# Patient Record
Sex: FEMALE | Race: BLACK OR AFRICAN AMERICAN | Employment: OTHER | URBAN - METROPOLITAN AREA
[De-identification: names, ages, dates, MRNs, and addresses within clinical notes are randomized per-mention and may not be internally consistent; named-entity substitution may affect disease eponyms.]

---

## 2017-06-28 ENCOUNTER — APPOINTMENT (OUTPATIENT)
Dept: ULTRASOUND IMAGING | Age: 62
DRG: 683 | End: 2017-06-28
Attending: EMERGENCY MEDICINE
Payer: COMMERCIAL

## 2017-06-28 ENCOUNTER — HOSPITAL ENCOUNTER (INPATIENT)
Age: 62
LOS: 5 days | Discharge: HOME | DRG: 683 | End: 2017-07-03
Attending: EMERGENCY MEDICINE | Admitting: INTERNAL MEDICINE
Payer: COMMERCIAL

## 2017-06-28 DIAGNOSIS — R73.9 HYPERGLYCEMIA: ICD-10-CM

## 2017-06-28 DIAGNOSIS — E87.5 ACUTE HYPERKALEMIA: ICD-10-CM

## 2017-06-28 DIAGNOSIS — R53.83 FATIGUE, UNSPECIFIED TYPE: ICD-10-CM

## 2017-06-28 DIAGNOSIS — D64.9 ANEMIA, UNSPECIFIED TYPE: ICD-10-CM

## 2017-06-28 DIAGNOSIS — N17.9 ACUTE RENAL FAILURE, UNSPECIFIED ACUTE RENAL FAILURE TYPE (HCC): Primary | ICD-10-CM

## 2017-06-28 DIAGNOSIS — R74.8 ELEVATED LIPASE: ICD-10-CM

## 2017-06-28 PROBLEM — R93.2 ABNORMAL ULTRASOUND OF GALLBLADDER: Status: ACTIVE | Noted: 2017-06-28

## 2017-06-28 PROBLEM — E11.9 DM TYPE 2 (DIABETES MELLITUS, TYPE 2) (HCC): Status: ACTIVE | Noted: 2017-06-28

## 2017-06-28 PROBLEM — I10 HTN (HYPERTENSION): Chronic | Status: ACTIVE | Noted: 2017-06-28

## 2017-06-28 PROBLEM — I10 HTN (HYPERTENSION): Status: ACTIVE | Noted: 2017-06-28

## 2017-06-28 PROBLEM — E11.9 DM TYPE 2 (DIABETES MELLITUS, TYPE 2) (HCC): Chronic | Status: ACTIVE | Noted: 2017-06-28

## 2017-06-28 LAB
ALBUMIN SERPL BCP-MCNC: 3.1 G/DL (ref 3.5–5)
ALBUMIN/GLOB SERPL: 0.7 {RATIO} (ref 1.1–2.2)
ALP SERPL-CCNC: 81 U/L (ref 45–117)
ALT SERPL-CCNC: 15 U/L (ref 12–78)
ANION GAP BLD CALC-SCNC: 14 MMOL/L (ref 5–15)
APPEARANCE UR: ABNORMAL
APTT PPP: 32.2 SEC (ref 22.1–32.5)
AST SERPL W P-5'-P-CCNC: 12 U/L (ref 15–37)
ATRIAL RATE: 77 BPM
BACTERIA URNS QL MICRO: ABNORMAL /HPF
BASOPHILS # BLD AUTO: 0 K/UL (ref 0–0.1)
BASOPHILS # BLD: 0 % (ref 0–1)
BILIRUB SERPL-MCNC: 0.5 MG/DL (ref 0.2–1)
BILIRUB UR QL: NEGATIVE
BUN SERPL-MCNC: 89 MG/DL (ref 6–20)
BUN/CREAT SERPL: 8 (ref 12–20)
CALCIUM SERPL-MCNC: 8.8 MG/DL (ref 8.5–10.1)
CALCULATED P AXIS, ECG09: 52 DEGREES
CALCULATED R AXIS, ECG10: 46 DEGREES
CALCULATED T AXIS, ECG11: 43 DEGREES
CHLORIDE SERPL-SCNC: 98 MMOL/L (ref 97–108)
CO2 SERPL-SCNC: 19 MMOL/L (ref 21–32)
COLOR UR: ABNORMAL
CREAT SERPL-MCNC: 11.18 MG/DL (ref 0.55–1.02)
CREAT UR-MCNC: 61.24 MG/DL
DIAGNOSIS, 93000: NORMAL
EOSINOPHIL # BLD: 0.2 K/UL (ref 0–0.4)
EOSINOPHIL NFR BLD: 2 % (ref 0–7)
EPITH CASTS URNS QL MICRO: ABNORMAL /LPF
ERYTHROCYTE [DISTWIDTH] IN BLOOD BY AUTOMATED COUNT: 14.3 % (ref 11.5–14.5)
EST. AVERAGE GLUCOSE BLD GHB EST-MCNC: 189 MG/DL
FERRITIN SERPL-MCNC: 79 NG/ML (ref 8–252)
FOLATE SERPL-MCNC: 22.7 NG/ML (ref 5–21)
GLOBULIN SER CALC-MCNC: 4.6 G/DL (ref 2–4)
GLUCOSE BLD STRIP.AUTO-MCNC: 179 MG/DL (ref 65–100)
GLUCOSE BLD STRIP.AUTO-MCNC: 212 MG/DL (ref 65–100)
GLUCOSE BLD STRIP.AUTO-MCNC: 232 MG/DL (ref 65–100)
GLUCOSE BLD STRIP.AUTO-MCNC: 251 MG/DL (ref 65–100)
GLUCOSE BLD STRIP.AUTO-MCNC: 271 MG/DL (ref 65–100)
GLUCOSE SERPL-MCNC: 251 MG/DL (ref 65–100)
GLUCOSE UR STRIP.AUTO-MCNC: 100 MG/DL
HAPTOGLOB SERPL-MCNC: 424 MG/DL (ref 30–200)
HBA1C MFR BLD: 8.2 % (ref 4.2–6.3)
HCT VFR BLD AUTO: 27.6 % (ref 35–47)
HEMOCCULT STL QL: NEGATIVE
HGB BLD-MCNC: 8.8 G/DL (ref 11.5–16)
HGB UR QL STRIP: ABNORMAL
INR PPP: 1 (ref 0.9–1.1)
IRON SATN MFR SERPL: 20 % (ref 20–50)
IRON SERPL-MCNC: 55 UG/DL (ref 35–150)
KETONES UR QL STRIP.AUTO: NEGATIVE MG/DL
LDH SERPL L TO P-CCNC: 214 U/L (ref 81–246)
LEUKOCYTE ESTERASE UR QL STRIP.AUTO: ABNORMAL
LIPASE SERPL-CCNC: 903 U/L (ref 73–393)
LYMPHOCYTES # BLD AUTO: 23 % (ref 12–49)
LYMPHOCYTES # BLD: 2.3 K/UL (ref 0.8–3.5)
MAGNESIUM SERPL-MCNC: 2.7 MG/DL (ref 1.6–2.4)
MCH RBC QN AUTO: 27.5 PG (ref 26–34)
MCHC RBC AUTO-ENTMCNC: 31.9 G/DL (ref 30–36.5)
MCV RBC AUTO: 86.3 FL (ref 80–99)
MONOCYTES # BLD: 0.4 K/UL (ref 0–1)
MONOCYTES NFR BLD AUTO: 4 % (ref 5–13)
NEUTS SEG # BLD: 6.9 K/UL (ref 1.8–8)
NEUTS SEG NFR BLD AUTO: 71 % (ref 32–75)
NITRITE UR QL STRIP.AUTO: NEGATIVE
P-R INTERVAL, ECG05: 210 MS
PH UR STRIP: 6 [PH] (ref 5–8)
PHOSPHATE SERPL-MCNC: 6.4 MG/DL (ref 2.6–4.7)
PLATELET # BLD AUTO: 383 K/UL (ref 150–400)
POTASSIUM SERPL-SCNC: 7.4 MMOL/L (ref 3.5–5.1)
PROT SERPL-MCNC: 7.7 G/DL (ref 6.4–8.2)
PROT UR STRIP-MCNC: ABNORMAL MG/DL
PROT UR-MCNC: 42 MG/DL (ref 0–11.9)
PROTHROMBIN TIME: 9.9 SEC (ref 9–11.1)
Q-T INTERVAL, ECG07: 416 MS
QRS DURATION, ECG06: 106 MS
QTC CALCULATION (BEZET), ECG08: 470 MS
RBC # BLD AUTO: 3.2 M/UL (ref 3.8–5.2)
RBC #/AREA URNS HPF: ABNORMAL /HPF (ref 0–5)
RETICS/RBC NFR AUTO: 0.5 % (ref 0.7–2.1)
SERVICE CMNT-IMP: ABNORMAL
SODIUM SERPL-SCNC: 131 MMOL/L (ref 136–145)
SP GR UR REFRACTOMETRY: 1.01 (ref 1–1.03)
THERAPEUTIC RANGE,PTTT: NORMAL SECS (ref 58–77)
TIBC SERPL-MCNC: 279 UG/DL (ref 250–450)
UROBILINOGEN UR QL STRIP.AUTO: 0.2 EU/DL (ref 0.2–1)
VENTRICULAR RATE, ECG03: 77 BPM
VIT B12 SERPL-MCNC: 361 PG/ML (ref 211–911)
WBC # BLD AUTO: 9.9 K/UL (ref 3.6–11)
WBC URNS QL MICRO: ABNORMAL /HPF (ref 0–4)

## 2017-06-28 PROCEDURE — 82962 GLUCOSE BLOOD TEST: CPT

## 2017-06-28 PROCEDURE — 87086 URINE CULTURE/COLONY COUNT: CPT | Performed by: INTERNAL MEDICINE

## 2017-06-28 PROCEDURE — 81001 URINALYSIS AUTO W/SCOPE: CPT | Performed by: EMERGENCY MEDICINE

## 2017-06-28 PROCEDURE — 94644 CONT INHLJ TX 1ST HOUR: CPT

## 2017-06-28 PROCEDURE — 76700 US EXAM ABDOM COMPLETE: CPT

## 2017-06-28 PROCEDURE — 65660000000 HC RM CCU STEPDOWN

## 2017-06-28 PROCEDURE — 74011000258 HC RX REV CODE- 258: Performed by: EMERGENCY MEDICINE

## 2017-06-28 PROCEDURE — 96374 THER/PROPH/DIAG INJ IV PUSH: CPT

## 2017-06-28 PROCEDURE — 74011636637 HC RX REV CODE- 636/637: Performed by: EMERGENCY MEDICINE

## 2017-06-28 PROCEDURE — 87077 CULTURE AEROBIC IDENTIFY: CPT | Performed by: INTERNAL MEDICINE

## 2017-06-28 PROCEDURE — 83690 ASSAY OF LIPASE: CPT | Performed by: EMERGENCY MEDICINE

## 2017-06-28 PROCEDURE — 87186 SC STD MICRODIL/AGAR DIL: CPT | Performed by: INTERNAL MEDICINE

## 2017-06-28 PROCEDURE — 74011250637 HC RX REV CODE- 250/637: Performed by: INTERNAL MEDICINE

## 2017-06-28 PROCEDURE — 82272 OCCULT BLD FECES 1-3 TESTS: CPT | Performed by: INTERNAL MEDICINE

## 2017-06-28 PROCEDURE — 83036 HEMOGLOBIN GLYCOSYLATED A1C: CPT | Performed by: INTERNAL MEDICINE

## 2017-06-28 PROCEDURE — 85610 PROTHROMBIN TIME: CPT | Performed by: EMERGENCY MEDICINE

## 2017-06-28 PROCEDURE — 74011636637 HC RX REV CODE- 636/637: Performed by: INTERNAL MEDICINE

## 2017-06-28 PROCEDURE — 74011000258 HC RX REV CODE- 258: Performed by: INTERNAL MEDICINE

## 2017-06-28 PROCEDURE — 83615 LACTATE (LD) (LDH) ENZYME: CPT | Performed by: INTERNAL MEDICINE

## 2017-06-28 PROCEDURE — 74011250636 HC RX REV CODE- 250/636: Performed by: EMERGENCY MEDICINE

## 2017-06-28 PROCEDURE — 93005 ELECTROCARDIOGRAM TRACING: CPT

## 2017-06-28 PROCEDURE — 82570 ASSAY OF URINE CREATININE: CPT | Performed by: INTERNAL MEDICINE

## 2017-06-28 PROCEDURE — 84156 ASSAY OF PROTEIN URINE: CPT | Performed by: INTERNAL MEDICINE

## 2017-06-28 PROCEDURE — 82607 VITAMIN B-12: CPT | Performed by: INTERNAL MEDICINE

## 2017-06-28 PROCEDURE — 74011250636 HC RX REV CODE- 250/636: Performed by: INTERNAL MEDICINE

## 2017-06-28 PROCEDURE — 96375 TX/PRO/DX INJ NEW DRUG ADDON: CPT

## 2017-06-28 PROCEDURE — 85045 AUTOMATED RETICULOCYTE COUNT: CPT | Performed by: INTERNAL MEDICINE

## 2017-06-28 PROCEDURE — 77030013140 HC MSK NEB VYRM -A

## 2017-06-28 PROCEDURE — 96361 HYDRATE IV INFUSION ADD-ON: CPT

## 2017-06-28 PROCEDURE — 82728 ASSAY OF FERRITIN: CPT | Performed by: INTERNAL MEDICINE

## 2017-06-28 PROCEDURE — 83540 ASSAY OF IRON: CPT | Performed by: INTERNAL MEDICINE

## 2017-06-28 PROCEDURE — 36415 COLL VENOUS BLD VENIPUNCTURE: CPT | Performed by: EMERGENCY MEDICINE

## 2017-06-28 PROCEDURE — 83735 ASSAY OF MAGNESIUM: CPT | Performed by: EMERGENCY MEDICINE

## 2017-06-28 PROCEDURE — 84100 ASSAY OF PHOSPHORUS: CPT | Performed by: EMERGENCY MEDICINE

## 2017-06-28 PROCEDURE — 74011250637 HC RX REV CODE- 250/637: Performed by: EMERGENCY MEDICINE

## 2017-06-28 PROCEDURE — 80053 COMPREHEN METABOLIC PANEL: CPT | Performed by: EMERGENCY MEDICINE

## 2017-06-28 PROCEDURE — 74011000250 HC RX REV CODE- 250: Performed by: EMERGENCY MEDICINE

## 2017-06-28 PROCEDURE — 83010 ASSAY OF HAPTOGLOBIN QUANT: CPT | Performed by: INTERNAL MEDICINE

## 2017-06-28 PROCEDURE — 99285 EMERGENCY DEPT VISIT HI MDM: CPT

## 2017-06-28 PROCEDURE — 85025 COMPLETE CBC W/AUTO DIFF WBC: CPT | Performed by: EMERGENCY MEDICINE

## 2017-06-28 PROCEDURE — 74011000250 HC RX REV CODE- 250: Performed by: INTERNAL MEDICINE

## 2017-06-28 PROCEDURE — 85730 THROMBOPLASTIN TIME PARTIAL: CPT | Performed by: EMERGENCY MEDICINE

## 2017-06-28 PROCEDURE — 82746 ASSAY OF FOLIC ACID SERUM: CPT | Performed by: INTERNAL MEDICINE

## 2017-06-28 RX ORDER — ONDANSETRON 4 MG/1
4 TABLET, FILM COATED ORAL
COMMUNITY
End: 2017-06-28

## 2017-06-28 RX ORDER — PROCHLORPERAZINE EDISYLATE 5 MG/ML
10 INJECTION INTRAMUSCULAR; INTRAVENOUS
Status: DISCONTINUED | OUTPATIENT
Start: 2017-06-28 | End: 2017-07-03 | Stop reason: HOSPADM

## 2017-06-28 RX ORDER — SODIUM CHLORIDE 0.9 % (FLUSH) 0.9 %
5-10 SYRINGE (ML) INJECTION EVERY 8 HOURS
Status: DISCONTINUED | OUTPATIENT
Start: 2017-06-28 | End: 2017-07-03 | Stop reason: HOSPADM

## 2017-06-28 RX ORDER — FAMOTIDINE 10 MG/ML
20 INJECTION INTRAVENOUS
Status: COMPLETED | OUTPATIENT
Start: 2017-06-28 | End: 2017-06-28

## 2017-06-28 RX ORDER — ACETAMINOPHEN 325 MG/1
650 TABLET ORAL
Status: DISCONTINUED | OUTPATIENT
Start: 2017-06-28 | End: 2017-07-03 | Stop reason: HOSPADM

## 2017-06-28 RX ORDER — DEXTROSE 50 % IN WATER (D50W) INTRAVENOUS SYRINGE
12.5-25 AS NEEDED
Status: DISCONTINUED | OUTPATIENT
Start: 2017-06-28 | End: 2017-07-03 | Stop reason: HOSPADM

## 2017-06-28 RX ORDER — DICYCLOMINE HYDROCHLORIDE 10 MG/1
10 CAPSULE ORAL
Status: COMPLETED | OUTPATIENT
Start: 2017-06-28 | End: 2017-06-28

## 2017-06-28 RX ORDER — IBUPROFEN 200 MG
200 TABLET ORAL
COMMUNITY
End: 2017-07-03

## 2017-06-28 RX ORDER — INSULIN LISPRO 100 [IU]/ML
INJECTION, SOLUTION INTRAVENOUS; SUBCUTANEOUS
Status: DISCONTINUED | OUTPATIENT
Start: 2017-06-28 | End: 2017-07-03 | Stop reason: HOSPADM

## 2017-06-28 RX ORDER — LISINOPRIL 30 MG/1
30 TABLET ORAL DAILY
COMMUNITY
End: 2017-07-03

## 2017-06-28 RX ORDER — SODIUM CHLORIDE 0.9 % (FLUSH) 0.9 %
5-10 SYRINGE (ML) INJECTION AS NEEDED
Status: DISCONTINUED | OUTPATIENT
Start: 2017-06-28 | End: 2017-06-28

## 2017-06-28 RX ORDER — ZOLPIDEM TARTRATE 5 MG/1
5 TABLET ORAL
Status: DISCONTINUED | OUTPATIENT
Start: 2017-06-28 | End: 2017-07-03 | Stop reason: HOSPADM

## 2017-06-28 RX ORDER — OXYCODONE AND ACETAMINOPHEN 5; 325 MG/1; MG/1
1 TABLET ORAL
Status: DISCONTINUED | OUTPATIENT
Start: 2017-06-28 | End: 2017-07-03 | Stop reason: HOSPADM

## 2017-06-28 RX ORDER — ALBUTEROL SULFATE 0.83 MG/ML
10 SOLUTION RESPIRATORY (INHALATION) CONTINUOUS
Status: DISCONTINUED | OUTPATIENT
Start: 2017-06-28 | End: 2017-06-28

## 2017-06-28 RX ORDER — DEXTROSE 50 % IN WATER (D50W) INTRAVENOUS SYRINGE
50
Status: COMPLETED | OUTPATIENT
Start: 2017-06-28 | End: 2017-06-28

## 2017-06-28 RX ORDER — ESTRADIOL AND NORETHINDRONE ACETATE .5; .1 MG/1; MG/1
1 TABLET ORAL DAILY
COMMUNITY
End: 2018-11-20 | Stop reason: ALTCHOICE

## 2017-06-28 RX ORDER — HEPARIN SODIUM 5000 [USP'U]/ML
5000 INJECTION, SOLUTION INTRAVENOUS; SUBCUTANEOUS EVERY 8 HOURS
Status: DISCONTINUED | OUTPATIENT
Start: 2017-06-28 | End: 2017-07-03 | Stop reason: HOSPADM

## 2017-06-28 RX ORDER — SODIUM BICARBONATE 1 MEQ/ML
50 SYRINGE (ML) INTRAVENOUS
Status: COMPLETED | OUTPATIENT
Start: 2017-06-28 | End: 2017-06-28

## 2017-06-28 RX ORDER — LORATADINE 10 MG/1
10 TABLET ORAL DAILY
Status: ON HOLD | COMMUNITY
End: 2017-07-03

## 2017-06-28 RX ORDER — TRIAMTERENE/HYDROCHLOROTHIAZID 37.5-25 MG
1 TABLET ORAL DAILY
COMMUNITY
End: 2017-07-03

## 2017-06-28 RX ORDER — GLIPIZIDE 10 MG/1
10 TABLET ORAL 2 TIMES DAILY
COMMUNITY
End: 2017-06-28

## 2017-06-28 RX ORDER — CALCIUM GLUCONATE 94 MG/ML
1 INJECTION, SOLUTION INTRAVENOUS
Status: DISCONTINUED | OUTPATIENT
Start: 2017-06-28 | End: 2017-06-28

## 2017-06-28 RX ORDER — INSULIN GLARGINE 100 [IU]/ML
10 INJECTION, SOLUTION SUBCUTANEOUS
Status: DISCONTINUED | OUTPATIENT
Start: 2017-06-28 | End: 2017-07-03 | Stop reason: HOSPADM

## 2017-06-28 RX ORDER — ASPIRIN 81 MG/1
81 TABLET ORAL DAILY
Status: DISCONTINUED | OUTPATIENT
Start: 2017-06-28 | End: 2017-07-03 | Stop reason: HOSPADM

## 2017-06-28 RX ORDER — TRIAMTERENE/HYDROCHLOROTHIAZID 37.5-25 MG
TABLET ORAL DAILY
COMMUNITY
End: 2017-06-28

## 2017-06-28 RX ORDER — LORATADINE 10 MG/1
10 TABLET ORAL EVERY OTHER DAY
Status: DISCONTINUED | OUTPATIENT
Start: 2017-06-30 | End: 2017-07-03 | Stop reason: HOSPADM

## 2017-06-28 RX ORDER — MAGNESIUM SULFATE 100 %
4 CRYSTALS MISCELLANEOUS AS NEEDED
Status: DISCONTINUED | OUTPATIENT
Start: 2017-06-28 | End: 2017-07-03 | Stop reason: HOSPADM

## 2017-06-28 RX ORDER — LORATADINE 10 MG/1
10 TABLET ORAL DAILY
Status: DISCONTINUED | OUTPATIENT
Start: 2017-06-28 | End: 2017-06-28

## 2017-06-28 RX ORDER — HYDROMORPHONE HYDROCHLORIDE 1 MG/ML
0.5 INJECTION, SOLUTION INTRAMUSCULAR; INTRAVENOUS; SUBCUTANEOUS
Status: DISCONTINUED | OUTPATIENT
Start: 2017-06-28 | End: 2017-07-03 | Stop reason: HOSPADM

## 2017-06-28 RX ORDER — ONDANSETRON 2 MG/ML
4 INJECTION INTRAMUSCULAR; INTRAVENOUS
Status: COMPLETED | OUTPATIENT
Start: 2017-06-28 | End: 2017-06-28

## 2017-06-28 RX ORDER — ONDANSETRON 4 MG/1
4 TABLET, ORALLY DISINTEGRATING ORAL
COMMUNITY
End: 2019-05-21 | Stop reason: ALTCHOICE

## 2017-06-28 RX ORDER — SODIUM POLYSTYRENE SULFONATE 15 G/60ML
45 SUSPENSION ORAL; RECTAL
Status: COMPLETED | OUTPATIENT
Start: 2017-06-28 | End: 2017-06-28

## 2017-06-28 RX ORDER — SODIUM CHLORIDE 0.9 % (FLUSH) 0.9 %
5-10 SYRINGE (ML) INJECTION EVERY 8 HOURS
Status: DISCONTINUED | OUTPATIENT
Start: 2017-06-28 | End: 2017-06-28

## 2017-06-28 RX ORDER — ASPIRIN 81 MG/1
81 TABLET ORAL DAILY
COMMUNITY

## 2017-06-28 RX ORDER — SODIUM CHLORIDE 0.9 % (FLUSH) 0.9 %
5-10 SYRINGE (ML) INJECTION AS NEEDED
Status: DISCONTINUED | OUTPATIENT
Start: 2017-06-28 | End: 2017-07-03 | Stop reason: HOSPADM

## 2017-06-28 RX ORDER — METFORMIN HYDROCHLORIDE 1000 MG/1
1000 TABLET ORAL 2 TIMES DAILY WITH MEALS
COMMUNITY
End: 2017-07-03

## 2017-06-28 RX ADMIN — Medication 10 ML: at 14:21

## 2017-06-28 RX ADMIN — Medication 5 ML: at 09:00

## 2017-06-28 RX ADMIN — INSULIN LISPRO 2 UNITS: 100 INJECTION, SOLUTION INTRAVENOUS; SUBCUTANEOUS at 10:14

## 2017-06-28 RX ADMIN — ONDANSETRON 4 MG: 2 INJECTION INTRAMUSCULAR; INTRAVENOUS at 06:11

## 2017-06-28 RX ADMIN — CALCIUM GLUCONATE 1 G: 94 INJECTION, SOLUTION INTRAVENOUS at 07:44

## 2017-06-28 RX ADMIN — ALBUTEROL SULFATE 10 MG: 2.5 SOLUTION RESPIRATORY (INHALATION) at 07:11

## 2017-06-28 RX ADMIN — HEPARIN SODIUM 5000 UNITS: 5000 INJECTION, SOLUTION INTRAVENOUS; SUBCUTANEOUS at 22:05

## 2017-06-28 RX ADMIN — SODIUM BICARBONATE 50 MEQ: 84 INJECTION, SOLUTION INTRAVENOUS at 07:33

## 2017-06-28 RX ADMIN — INSULIN LISPRO 3 UNITS: 100 INJECTION, SOLUTION INTRAVENOUS; SUBCUTANEOUS at 17:05

## 2017-06-28 RX ADMIN — SODIUM POLYSTYRENE SULFONATE 45 G: 15 SUSPENSION ORAL; RECTAL at 07:53

## 2017-06-28 RX ADMIN — FAMOTIDINE 20 MG: 10 INJECTION INTRAVENOUS at 06:11

## 2017-06-28 RX ADMIN — HUMAN INSULIN 10 UNITS: 100 INJECTION, SOLUTION SUBCUTANEOUS at 07:14

## 2017-06-28 RX ADMIN — HEPARIN SODIUM 5000 UNITS: 5000 INJECTION, SOLUTION INTRAVENOUS; SUBCUTANEOUS at 14:22

## 2017-06-28 RX ADMIN — SODIUM CHLORIDE: 450 INJECTION, SOLUTION INTRAVENOUS at 09:37

## 2017-06-28 RX ADMIN — Medication 10 ML: at 22:06

## 2017-06-28 RX ADMIN — INSULIN LISPRO 1 UNITS: 100 INJECTION, SOLUTION INTRAVENOUS; SUBCUTANEOUS at 22:04

## 2017-06-28 RX ADMIN — LORATADINE 10 MG: 10 TABLET ORAL at 11:50

## 2017-06-28 RX ADMIN — ASPIRIN 81 MG: 81 TABLET, COATED ORAL at 11:50

## 2017-06-28 RX ADMIN — INSULIN GLARGINE 10 UNITS: 100 INJECTION, SOLUTION SUBCUTANEOUS at 22:05

## 2017-06-28 RX ADMIN — SODIUM CHLORIDE 1000 ML: 900 INJECTION, SOLUTION INTRAVENOUS at 06:09

## 2017-06-28 RX ADMIN — DEXTROSE MONOHYDRATE 25 G: 25 INJECTION, SOLUTION INTRAVENOUS at 07:33

## 2017-06-28 RX ADMIN — Medication 10 ML: at 06:11

## 2017-06-28 RX ADMIN — DICYCLOMINE HYDROCHLORIDE 10 MG: 10 CAPSULE ORAL at 06:10

## 2017-06-28 NOTE — PROGRESS NOTES
6/28/2017  CARE MANAGEMENT NOTE:  CM is following pt in the ER for initial discharge planning. EMR reviewed. CM met with pt and family at bedside to obtain hx for this needs assessment. Pt's , Catherine Roach ([phone]) is her primary family contact. PTA, pt was ambulatory, indepn with ADLs,and drives. She is employed full time with [employer]. Pt has RX drug coverage and she uses [facility] or [employer]. She does not have home healthcare nor DME for home use. Pt has a glucometer. PCP is Dr. Aba Myers or Dr. Yenifer Watts. Plan is to return home when medically stable.   Sofiya

## 2017-06-28 NOTE — PROGRESS NOTES
[facility]: MED RECONCILIATION    Comments/Recommendations:    Spoke with patient in detail about her diabetes medications. o Glipizide  - On 6/6/2017, patient saw Dr. Jeancarlos Guadarrama for N/V/D who instructed her to stop taking her glipizide. At that time, she was only taking her glipizide once daily after her morning or evening meal. Per package information, glipizide should be taken 30 minutes prior to meals achieve greatest reduction in postprandial hyperglycemia and may also help prevent hypoglycemic events. Patient was taking after meals because she thought she would get sick taking it on an empty stomach and says she wasn't instructed to take it before meals. She says she has been taking glipizide for years and this is the first time she's ever experienced BG as low as 50. Pharmacy explained to patient that she may benefit from taking prior to meals should her doctor resume this medication. o Metformin  - Patient says she has also been taking metformin for years and has never experienced GI upset while on this medication. She is currently at 1,000 mg twice daily but believes she was started at a lower dose and then titrated up. She reports missing a few days of her metformin while she was sick but otherwise taking it regularly as prescribed. - Of note, patient brought in her medication bottles and the metformin bottle was filled for #180 on 2/15/17. Given that the bottle was still half full, pharmacy asked if she ever forgets to take her metformin - patient denies. She says she has a newly refilled bottle at home and was unable to answer why she has medication leftover.  Patient tries to check her blood glucose 2-3x per day, but sometimes isn't able to when she's at work or with the children. She reports that before breakfast she is usually at  and may increase to 100-110 if she eats something sweet. Lately she has noticed values in the 170s and lows in the 50s.     Patient used to check her blood pressure regularly at [facility] and at work, but since it has been normal she no longer checks.  Unable to determine patient's baseline renal function. Due to current renal function, metformin is contraindicated and consider limiting use of ibuprofen.  Triamterene-hydrochlorothiazide cause hyperkalemia, especially in patients with renal impairment and diabetes. Patient's potassium is 7.4. Monitor closely and consider alternative agent.  Consider reducing loratadine frequency to every 48 hours due to crcl less than 10 ml/min    Medications added:     · Ibuprofen 200 mg 1 tablet by mouth daily as needed for headache  · Aspirin 81 mg enteric coated 1 tablet by mouth daily   · Loratadine 10 mg 1 tablet by mouth daily    Medications removed:    · Glipidize 10 mg     Requested patient bring in home medication Amabelz in original packaging if ordered by physician and to notify nurse when the medications arrives. Reviewed the hospitals policy for the use of patients own medication while admitted including the following:   · The medication will be identified by a pharmacist and placed in a zip lock bag with a barcoded label specifically for the patient.    · The medication will be kept in a locked medication cabinet called [employer] in a drawer specific for the patient  · An administration note will be added to the order instructing the nurse where she or he may locate the medication  · A note will be entered on the discharge paperwork instructing the nurse to return this medication to the patient at discharge    Medication reconciliation completed with patients own medications at the patients bedside  · Counseled the patient to:  · Not use their own medication while in the hospital unless otherwise instructed  · Have a family member take the medication home as soon as possible  · If medication cannot be taken home, request that the patient notify the nurse so the medication may be locked up according to hospital policy    Information obtained from: patient and medication bottles    Allergies: Penicillins    Prior to Admission Medications:     Prior to Admission Medications   Prescriptions Last Dose Informant Patient Reported? Taking?   aspirin delayed-release 81 mg tablet 6/27/2017 at Unknown time  Yes Yes   Sig: Take 81 mg by mouth daily. estradiol-norethindrone (AMABELZ) 0.5-0.1 mg per tablet 6/27/2017 at Unknown time  Yes Yes   Sig: Take 1 Tab by mouth daily. ibuprofen (ADVIL) 200 mg tablet   Yes Yes   Sig: Take 200 mg by mouth daily as needed for Pain. lisinopril (PRINIVIL, ZESTRIL) 30 mg tablet 6/27/2017 at Unknown time  Yes Yes   Sig: Take 30 mg by mouth daily. loratadine (CLARITIN) 10 mg tablet 6/27/2017 at Unknown time  Yes Yes   Sig: Take 10 mg by mouth.   metFORMIN (GLUCOPHAGE) 1,000 mg tablet 6/27/2017 at Unknown time  Yes Yes   Sig: Take 1,000 mg by mouth two (2) times daily (with meals). ondansetron (ZOFRAN ODT) 4 mg disintegrating tablet 6/27/2017 at Unknown time  Yes Yes   Sig: Take 4 mg by mouth every six (6) hours as needed for Nausea. triamterene-hydroCHLOROthiazide (MAXZIDE) 37.5-25 mg per tablet 6/28/2017 at Unknown time  Yes Yes   Sig: Take 1 Tab by mouth daily.              Judson Sifuentes, PharmD Candidate 2018    Reviewed and approved    Geeta Mcdonald PharmD, BCPS

## 2017-06-28 NOTE — DIABETES MGMT
[facility]    Progress Note     Recommendations/ Comments: Chart reviewed for elevated blood glucose ( > 180 mg/dL x 2 in the past 24 hours) . Elie Cook is a 62 y.o. female with a past medical history significant for DM per Dr. Hussein Miles MD's H&P dated 6/28/2017. Fasting glucose today: 251 mg/dL (per POCT Glucose). Required 2 units of correction so far today. Recent Glucose Results:   Lab Results   Component Value Date/Time     (H) 06/28/2017 06:03 AM    GLUCPOC 179 (H) 06/28/2017 12:28 PM    GLUCPOC 232 (H) 06/28/2017 09:30 AM    GLUCPOC 251 (H) 06/28/2017 05:46 AM        Hospital (inpatient) medications:  1. Correction Scale: Lispro (Humalog) High Sensitivity scale (thin, ESRD) to cover for glucose > 199 mg/dL  before meals and for glucose >199 at bedtime      2. Lantus 10 units     Prior to admission medications: per past medical records  Metformin 1000 mg twice a day with meals       Lab Results   Component Value Date/Time    Hemoglobin A1c 8.2 06/28/2017 09:20 AM     Estimated Creatinine Clearance: 6.2 mL/min (based on Cr of 11.18). Active Orders   There are no active orders of the following type(s): Diet. Thank you. Rhys Mckinnon. SANDY OseiN, MPH  Diabetes [address] [place] [address]  [phone]    -For most hospitalized persons with hyperglycemia in the intensive care unit (ICU), a glucose range of 140 to 180 mg/dL is recommended, provided this target can be safely achieved. *  - For general medicine and surgery patients in non-ICU settings, a premeal glucose target <140 mg/dL and a random blood glucose <180 mg/dL are recommended. *    LUDWIG Leal., Marleny Olivarez., Henri Irene., ... & Fern Reyes ([id number]).  AM[employer]ICAN ASSOCIATION OF CLINICAL ENDOCRINOLOGISTS AND AMERICAN COLLEGE OF ENDOCRINOLOGY-CLINICAL PRACTICE GUIDELINES FOR DEVELOPING A DIABETES MELLITUS COMPREHENSIVE CARE PLAN-2015-EXECUTIVE SUMMARY: Complete guidelines are available at https://www. aace. com/publications/guidelines. Endocrine Practice, 21(4), [id number].

## 2017-06-28 NOTE — ED NOTES
Bedside and Verbal shift change report given to Sharifa (oncoming nurse) by Montrell Kyle (offgoing nurse). Report included the following information SBAR, Kardex, ED Summary, MAR, Recent Results and Med Rec Status.

## 2017-06-28 NOTE — IP AVS SNAPSHOT
Current Discharge Medication List  
  
START taking these medications Dose & Instructions Dispensing Information Comments Morning Noon Evening Bedtime  
 b complex-vitamin c-folic acid 1 mg capsule Commonly known as:  Tamar Lopez Your last dose was: Your next dose is:    
   
   
 Dose:  1 Cap Take 1 Cap by mouth daily for 30 days. Quantity:  30 Cap Refills:  0  
     
   
   
   
  
 insulin glargine 100 unit/mL injection Commonly known as:  LANTUS Your last dose was: Your next dose is:    
   
   
 Inject 10 unites once daily. Quantity:  1 Vial  
Refills:  0  
     
   
   
   
  
 sevelamer 800 mg tablet Commonly known as:  RENAGEL Your last dose was: Your next dose is:    
   
   
 Dose:  1600 mg Take 2 Tabs by mouth three (3) times daily (with meals) for 30 days. Quantity:  180 Tab Refills:  0 CONTINUE these medications which have NOT CHANGED Dose & Instructions Dispensing Information Comments Morning Noon Evening Bedtime AMABELZ 0.5-0.1 mg per tablet Generic drug:  estradiol-norethindrone Your last dose was: Your next dose is:    
   
   
 Dose:  1 Tab Take 1 Tab by mouth daily. Refills:  0  
     
   
   
   
  
 aspirin delayed-release 81 mg tablet Your last dose was: Your next dose is:    
   
   
 Dose:  81 mg Take 81 mg by mouth daily. Refills:  0 CLARITIN 10 mg tablet Generic drug:  loratadine Your last dose was: Your next dose is:    
   
   
 Dose:  10 mg Take 10 mg by mouth daily. Refills:  0  
     
   
   
   
  
 ondansetron 4 mg disintegrating tablet Commonly known as:  ZOFRAN ODT Your last dose was: Your next dose is:    
   
   
 Dose:  4 mg Take 4 mg by mouth every six (6) hours as needed for Nausea. Refills:  0 STOP taking these medications ADVIL 200 mg tablet Generic drug:  ibuprofen  
   
  
 lisinopril 30 mg tablet Commonly known as:  PRINIVIL, ZESTRIL  
   
  
 metFORMIN 1,000 mg tablet Commonly known as:  GLUCOPHAGE  
   
  
 triamterene-hydroCHLOROthiazide 37.5-25 mg per tablet Commonly known as:  Vineet Crabtree Where to Get Your Medications Information on where to get these meds will be given to you by the nurse or doctor. ! Ask your nurse or doctor about these medications  
  b complex-vitamin c-folic acid 1 mg capsule  
 insulin glargine 100 unit/mL injection  
 sevelamer 800 mg tablet

## 2017-06-28 NOTE — ED NOTES
Report received from [place], [facility]. Assumed care of pt. Bed in locked and in low position with call bell within reach. Using AIDET - Introduced self as primary nurse and plan of care discussed with pt. Pt verbalizes understanding. Pt denies any additional complaints at this time. White board updated. Patient advised that medical information will be discussed and it is their responsibility to tell this nurse if such conversation should not take place in the presence of visitors. Pt verbalizes understanding. Pt's  at the bedside. Pt's nebulizer running. Cardiac monitor placed.

## 2017-06-28 NOTE — PROGRESS NOTES
[facility] Dosing [facility]  Automatic renal adjustment of loratadine  Physician: Dony Salas    Loratadine was automatically dose-adjusted per [facility] P&T Committee Protocol with respect to renal function. Order changed to loratadine 10 mg PO every other day. Pt Weight:   Wt Readings from Last 1 Encounters:   06/28/17 90.7 kg (200 lb)     Previous Regimen Loratadine 10 mg PO daily   Serum Creatinine Lab Results   Component Value Date/Time    Creatinine 11.18 06/28/2017 06:03 AM       Creatinine Clearance Estimated Creatinine Clearance: 6.2 mL/min (based on Cr of 11.18). BUN Lab Results   Component Value Date/Time    BUN 89 06/28/2017 06:03 AM         Pharmacy will continue to monitor patient daily and will make dosage adjustments based upon changing renal function.     Thank you,  Flash Rust, PharmD, [facility]

## 2017-06-28 NOTE — IP AVS SNAPSHOT
Jewel Duval 
 
 
 [address] 
[phone] Patient: Cherrie Mccallum MRN: [id number] IUT:1/93/2504 You are allergic to the following Allergen Reactions Penicillins Swelling Recent Documentation Height Weight BMI Smoking Status 1.702 m 90.5 kg 31.25 kg/m2 Never Smoker Unresulted Labs Order Current Status SAMPLE TO BLOOD BANK In process SAMPLE TO BLOOD BANK In process Emergency Contacts Name Discharge Info Relation Home Work Mobile Robby Trejo DISCHARGE CAREGIVER [3] Spouse [3] [phone] About your hospitalization You were admitted on:  June 28, 2017 You last received care in the:  [facility] 4M POST SURG ORT 2 You were discharged on:  July 3, 2017 Unit phone number:  [phone] Why you were hospitalized Your primary diagnosis was:  Arf (Acute Renal Failure) (Hcc) Your diagnoses also included:  Htn (Hypertension), Hyperkalemia, Dm Type 2 (Diabetes Mellitus, Type 2) (Hcc), Anemia, Abnormal Ultrasound Of Gallbladder, Obesity (Bmi 30.0-34. 9) Providers Seen During Your Hospitalizations Provider Role Specialty Primary office phone Bryan Ruiz DO Attending Provider Emergency Medicine [phone] Ariana Dominguez MD Attending Provider Internal Medicine [phone] Andrey Lozano MD Attending Provider Internal Medicine [phone] Your Primary Care Physician (PCP) Primary Care Physician Office Phone Office Fax [address], [address] [phone] Follow-up Information Follow up With Details Comments Contact Info Henrik Albert MD   [facility] [address] [place] [address] 
[phone] Bud Piña MD Schedule an appointment as soon as possible for a visit in 1 week  [address] [facility] [address] 1400 [address] [place] [address] 
[phone] marcelinabea at [address] [facility]   [phone] (first appt is thursday at 11:30- please arrive at 11:00) Your Appointments Wednesday July 05, 2017 11:00 AM EDT  
[facility] MAMMO SCREENING with [facility] SUSAN 2 [facility] ([address]) [address] [facility]  
[phone] Shower or bathe using soap and water. Do not use deodorant, powder, perfumes, or lotion the day of your exam.  If your prior mammograms were not performed at [facility] 6 please bring films with you or forward prior images 2 days before your procedure. Check in at registration 15min before your appointment time unless you were instructed to do otherwise. A script is not necessary, but if you have one, please bring it on the day of the mammogram or have it faxed to the department. [facility] [phone] [facility] [place]  [phone] [address]  [phone] [facility] [phone] [address] [phone] Park in designated visitor/patient parking. Enter through the main entrance, which is just to the left of the fountain. Once inside, go around the corner to the left. You will register in Outpatient Registration. Current Discharge Medication List  
  
START taking these medications Dose & Instructions Dispensing Information Comments Morning Noon Evening Bedtime  
 b complex-vitamin c-folic acid 1 mg capsule Commonly known as:  Ban Fox Your last dose was: Your next dose is:    
   
   
 Dose:  1 Cap Take 1 Cap by mouth daily for 30 days. Quantity:  30 Cap Refills:  0  
     
   
   
   
  
 insulin glargine 100 unit/mL injection Commonly known as:  LANTUS Your last dose was: Your next dose is:    
   
   
 Inject 10 unites once daily. Quantity:  1 Vial  
Refills:  0  
     
   
   
   
  
 sevelamer 800 mg tablet Commonly known as:  RENAGEL Your last dose was: Your next dose is:    
   
   
 Dose:  1600 mg Take 2 Tabs by mouth three (3) times daily (with meals) for 30 days. Quantity:  180 Tab Refills:  0 CONTINUE these medications which have CHANGED Dose & Instructions Dispensing Information Comments Morning Noon Evening Bedtime  
 loratadine 10 mg tablet Commonly known as:  Fabian Kumar Start taking on:  7/4/2017 What changed:  when to take this Your last dose was: Your next dose is:    
   
   
 Dose:  10 mg Take 1 Tab by mouth every other day. Quantity:  1 Tab Refills:  0 CONTINUE these medications which have NOT CHANGED Dose & Instructions Dispensing Information Comments Morning Noon Evening Bedtime AMABELZ 0.5-0.1 mg per tablet Generic drug:  estradiol-norethindrone Your last dose was: Your next dose is:    
   
   
 Dose:  1 Tab Take 1 Tab by mouth daily. Refills:  0  
     
   
   
   
  
 aspirin delayed-release 81 mg tablet Your last dose was: Your next dose is:    
   
   
 Dose:  81 mg Take 81 mg by mouth daily. Refills:  0  
     
   
   
   
  
 ondansetron 4 mg disintegrating tablet Commonly known as:  ZOFRAN ODT Your last dose was: Your next dose is:    
   
   
 Dose:  4 mg Take 4 mg by mouth every six (6) hours as needed for Nausea. Refills:  0 STOP taking these medications ADVIL 200 mg tablet Generic drug:  ibuprofen  
   
  
 lisinopril 30 mg tablet Commonly known as:  PRINIVIL, ZESTRIL  
   
  
 metFORMIN 1,000 mg tablet Commonly known as:  GLUCOPHAGE  
   
  
 triamterene-hydroCHLOROthiazide 37.5-25 mg per tablet Commonly known as:  Saloni Shepherd Where to Get Your Medications Information on where to get these meds will be given to you by the nurse or doctor. ! Ask your nurse or doctor about these medications b complex-vitamin c-folic acid 1 mg capsule  
 insulin glargine 100 unit/mL injection  
 loratadine 10 mg tablet  
 sevelamer 800 mg tablet Discharge Instructions Patient Discharge Instructions Hamlet Bowen / [id number] : 1955 Admitted 2017 Discharged: 7/3/2017 Primary Diagnoses Problem List as of 7/3/2017  Date Reviewed: 2017 Codes Class Noted - Resolved Obesity (BMI 30.0-34.9) (Chronic) * (Principal)ARF (acute renal failure) ([address] HTN (hypertension) (Chronic) DM type 2 (diabetes mellitus, type 2) ([facility]) (Chronic) Anemia Take Home Medications · It is important that you take the medication exactly as they are prescribed. · Keep your medication in the bottles provided by the pharmacist and keep a list of the medication names, dosages, and times to be taken in your wallet. · Do not take other medications without consulting your doctor. What to do at [facility] Recommended diet: Diabetic Diet and Renal Diet Recommended activity: Activity as tolerated If you experience worse symptoms, please follow up with your dialysis doctor. Follow-up with your PCP in a few weeks, dialysis on Tuesday, Thursday and Saturday Kidney Dialysis: Care Instructions Your Care Instructions Dialysis is a process that filters wastes from the blood when your kidneys can no longer do the job. It is not a cure, but it can help you live longer and feel better. It is a lifesaving treatment when you have kidney failure. Normal kidneys work 24 hours a day to clean wastes from your blood. Your kidneys are not able to do this job, so a process called dialysis will do some of the work for your kidneys. You and your doctor will decide which type of dialysis you should have. Peritoneal dialysis uses the lining of your belly (peritoneum) to filter your blood.  You can do it at home, on a daily basis. Hemodialysis uses a man-made filter called a dialyzer to clean your blood. Most people need to go to a hospital or clinic 3 days a week for several hours each time. Sometimes hemodialysis can be done at home. It is normal to have questions about your treatment, and you have a right to know what is happening to you. Learning about dialysis can help you take an active role in your treatment. Dialysis does not cure kidney disease, but it can help you live longer and feel better. You will need to follow your diet and treatment schedule carefully. Follow-up care is a key part of your treatment and safety. Be sure to make and go to all appointments, and call your doctor if you are having problems. It's also a good idea to know your test results and keep a list of the medicines you take. What do you need to know about peritoneal dialysis? Peritoneal dialysis uses the lining of your belly (or peritoneal membrane) to filter your blood. Before you can begin peritoneal dialysis, your doctor will need to place a thin tube called a catheter in your belly. This is the dialysis access. · Peritoneal dialysis can be done at home or in any clean place. You may be able to do it while you sleep. · You can do it by yourself. You do not have to rely on help from others. · You can do it at the times you choose as long as you do the right number of treatments. · It has to be done every day of the week. · Some people find it hard to do all the required steps. · It increases your chance for a serious infection of the lining of the belly (peritoneum). What do you need to know about hemodialysis? Hemodialysis uses a man-made membrane called a dialyzer to clean your blood. You are connected to the dialyzer by tubes attached to your blood vessels.  Before you start hemodialysis, your doctor will create a site where the blood can flow in and out of your body during your dialysis sessions. This site is called the vascular access. It may be a fistula, made by connecting an artery and a vein. Or it may be a graft, which is a tube implanted under your skin. · Hemodialysis is done mainly by trained health workers who can watch for any problems. · It allows you to be in contact with other people having dialysis. This can help provide emotional support. · You can schedule your treatments in the evenings so you can keep working. · You may be able to do home hemodialysis, which gives you more control over your schedule. · It usually needs to be done on a set schedule 3 times a week. · It can cause side effects. The most common side effects are low blood pressure and muscle cramps. These can often be treated easily. · It requires needle sticks during every treatment, which bothers some people. Others get used to it and even do the needle sticks themselves. How can you care for yourself at home? · Be sure to have all of your dialysis sessions. Do not try to shorten or skip your sessions. You have a better chance of a longer and healthier life by getting your full treatment. · Your doctor or health care team will show you the steps you need to go through each day before, during, and after dialysis. Be sure to follow these steps. If you do not understand a step, talk to your team. 
· Your doctor and dietitian will help you design menus that follow your diet. Be sure to follow your diet guidelines. ¨ You will need to limit fluids and certain foods that contain salt (sodium), potassium, and phosphorus. ¨ You may need to follow a heart-healthy diet to keep the fat (cholesterol) in your blood under control. ¨ You may need higher levels of protein in your diet. · Your doctor may recommend certain vitamins.  But do not take any other medicine, including over-the-counter medicines, vitamins, and herbal products, without talking to your doctor first. 
 · Do not smoke. Smoking raises your risk of many health problems, including more kidney damage. If you need help quitting, talk to your doctor about stop-smoking programs and medicines. These can increase your chances of quitting for good. · Do not take ibuprofen (Advil, Motrin), naproxen (Aleve), or similar medicines, unless your doctor tells you to. These medicines may make kidney problems worse. When should you call for help? Call 911 anytime you think you may need emergency care. For example, call if: 
· You passed out (lost consciousness). · You have severe shortness of breath. Call your doctor now or seek immediate medical care if: 
· You have swelling in your hands or feet. · You are dizzy or lightheaded, or you feel like you may faint. · You have an unusual weight gain. · You have trembling or trouble thinking clearly. · You have a fever. Watch closely for changes in your health, and be sure to contact your doctor if: 
· You have any problems with dialysis or your diet. Where can you learn more? Go to http://reji-yue.info/. Enter B898 in the search box to learn more about \"Kidney Dialysis: Care Instructions. \" Current as of: April 3, 2017 Content Version: 11.3 © 7962-5237 [employer]. Care instructions adapted under license by [employer] (which disclaims liability or warranty for this information). If you have questions about a medical condition or this instruction, always ask your healthcare professional. [place] [address] any warranty or liability for your use of this information. Information obtained by : 
I understand that if any problems occur once I am at home I am to contact my physician. I understand and acknowledge receipt of the instructions indicated above. Physician's or R.N.'s Signature                                                                  Date/Time Patient or Representative Signature                                                          Date/Time Discharge Orders None [facility] Announcement We are excited to announce that we are making your provider's discharge notes available to you in [employer] You will see these notes when they are completed and signed by the physician that discharged you from your recent hospital stay. If you have any questions or concerns about any information you see in [employer], please call the Health Information Department where you were seen or reach out to your Primary Care Provider for more information about your plan of care. Introducing [facility]! Dear Corwin Clancy: 
Thank you for requesting a [employer] account. Our records indicate that you already have an active [employer] account. You can access your account anytime at [email or web address] Member Savings Program/[employer] Did you know that you can access your hospital and ER discharge instructions at any time in [employer]? You can also review all of your test results from your hospital stay or ER visit. Additional Information If you have questions, please visit the Frequently Asked Questions section of the [employer] website at [email or web address] Member Savings [email or web address]/. Remember, [employer] is NOT to be used for urgent needs. For medical emergencies, dial 911. Now available from your iPhone and Android! General Information Please provide this summary of care documentation to your next provider. Patient Signature:  ____________________________________________________________ Date:  ____________________________________________________________  
  
Ck Nieves  Provider Signature: ____________________________________________________________ Date:  ____________________________________________________________

## 2017-06-28 NOTE — ROUTINE PROCESS
TRANSFER - OUT REPORT:    Verbal report given to Trini Weinberg RN(name) on Cherrie Mccallum  being transferred to [facility] (unit) for routine progression of care       Report consisted of patients Situation, Background, Assessment and   Recommendations(SBAR). Information from the following report(s) SBAR, ED Summary, Procedure Summary, Intake/Output, MAR, Recent Results and Cardiac Rhythm Sinus Tach/ Sinus was reviewed with the receiving nurse. Lines:   Peripheral IV 06/28/17 Left Antecubital (Active)   Site Assessment Clean, dry, & intact 6/28/2017  6:01 AM   Phlebitis Assessment 0 6/28/2017  6:01 AM   Infiltration Assessment 0 6/28/2017  6:01 AM   Dressing Status Clean, dry, & intact 6/28/2017  6:01 AM   Dressing Type Transparent 6/28/2017  6:01 AM   Hub Color/Line Status Pink;Flushed 6/28/2017  6:01 AM   Action Taken Blood drawn 6/28/2017  6:01 AM       Peripheral IV 06/28/17 Right Antecubital (Active)   Site Assessment Clean, dry, & intact 6/28/2017  9:26 AM   Phlebitis Assessment 0 6/28/2017  9:26 AM   Infiltration Assessment 0 6/28/2017  9:26 AM   Dressing Status Clean, dry, & intact 6/28/2017  9:26 AM   Dressing Type Tape;Transparent 6/28/2017  9:26 AM   Hub Color/Line Status Pink;Flushed;Patent 6/28/2017  9:26 AM        Opportunity for questions and clarification was provided.       Patient transported with:   Tech (Order states patient can go off unit without monitor/RN)

## 2017-06-28 NOTE — H&P
[address]  [phone]    Admission History and Physical      NAME:  Hamlet Bowen   :   [birth year]   MRN:  [id number]     PCP:  Jyotsna Wade MD     Date/Time:  2017         Subjective:     CHIEF COMPLAINT: nausea     HISTORY OF PRESENT ILLNESS:     Ms. Ivett Marshall is a 62 y.o.  female who is admitted with ARF. Ms. Ivett Marshall presented to the Emergency Department this AM complaining of nausea: for the past week, moderate, constant, slowly worsening, associated with uncontrolled blood sugars    History obtained from spouse, chart review and the patient. Previous records reviewed - minimal records in out system, mostly radiology testing    Past Medical History:   Diagnosis Date    Diabetes ([address]     Hypertension         Past Surgical History:   Procedure Laterality Date    HX GYN      c section       Social History   Substance Use Topics    Smoking status: Never Smoker    Smokeless tobacco: Never Used    Alcohol use Yes      Comment: wine accosinaly        Family History   Problem Relation Age of Onset    Alzheimer Mother     Lupus Father     Kidney Disease Other      uncle, niece, cousin        No Known Allergies     Prior to Admission medications    Medication Sig Start Date End Date Taking? Authorizing Provider   metFORMIN (GLUCOPHAGE) 1,000 mg tablet Take 1,000 mg by mouth two (2) times daily (with meals). Yes Sam Piña MD   glipiZIDE (GLUCOTROL) 10 mg tablet Take 10 mg by mouth two (2) times a day. Yes Sam Piña MD   triamterene-hydroCHLOROthiazide (MAXZIDE) 37.5-25 mg per tablet Take  by mouth daily. Yes Sam Piña MD   lisinopril (PRINIVIL, ZESTRIL) 30 mg tablet Take 30 mg by mouth daily. Yes Sam Piña MD   ondansetron hcl (ZOFRAN) 4 mg tablet Take 4 mg by mouth every six (6) hours as needed for Nausea.    Yes Sam Piña MD   estradiol-norethindrone (AMABELZ) 0.5-0.1 mg per tablet Take 1 Tab by mouth daily. Yes Phys Other, MD         Review of Systems:  (bold if positive, if negative)    Gen:  fatigueEyes:  ENT:  CVS:  Pulm:  GI:  nausea  :    MS:  Skin:  Psych:  Endo:    Hem:  Renal:    Neuro:            Objective:      VITALS:    Vital signs reviewed; most recent are:    Visit Vitals    /82    Pulse 81    Temp 98.5 °F (36.9 °C)    Resp 14    Ht 5' 7\" (1.702 m)    Wt 90.7 kg (200 lb)    SpO2 100%    BMI 31.32 kg/m2     SpO2 Readings from Last 6 Encounters:   06/28/17 100%        No intake or output data in the 24 hours ending 06/28/17 0731         Exam:     Physical Exam:    Gen: Well-developed, well-nourished, in no acute distress  HEENT:  Pink conjunctivae, PERRL, periorbital edema present, hearing intact to voice, moist mucous membranes  Neck: Supple, without masses, thyroid non-tender  Resp: No accessory muscle use, clear breath sounds without wheezes rales or rhonchi  Card: No murmurs, normal S1, S2 without thrills, bruits or peripheral edema, 2+ LE peripheral pulses  Abd:  Soft, non-tender, non-distended, normoactive bowel sounds are present, no palpable organomegaly and no detectable hernias  Lymph:  No cervical or inguinal adenopathy  Musc: No cyanosis or clubbing  Skin: No rashes or ulcers, skin turgor is good, cap refill <2 sec  Neuro:  Cranial nerves are grossly intact, no focal motor weakness, follows commands appropriately  Psych:  Good insight, oriented to person, place and time, alert             Labs:    Recent Labs      06/28/17   0603   WBC  9.9   HGB  8.8*   HCT  27.6*   PLT  383     Recent Labs      06/28/17   0603   NA  131*   K  7.4*   CL  98   CO2  19*   GLU  251*   BUN  89*   CREA  11.18*   CA  8.8   ALB  3.1*   TBILI  0.5   SGOT  12*   ALT  15     Lab Results   Component Value Date/Time    Glucose (POC) 251 06/28/2017 05:46 AM     No results for input(s): PH, PCO2, PO2, HCO3, FIO2 in the last 72 hours.   No results for input(s): INR in the last 72 hours.    No lab exists for component: INREXT, INREXT           Assessment/Plan:       Principal Problem:    [facility] (6/28/2017)   - suspect progression of CKD   - hold diuretic and ACE   - Renal to see   - obtaining renal US to look for obstruction   -mild acidosis on BMP consistent with renal failure   - she does appear to have some anasarca without much dependent edema but has only trace protein on her UA, send urine protein and creatinine    Active Problems:    HTN (hypertension) (6/28/2017)   - follow BP off diuretic and ACE   - may need alternate agents      Hyperkalemia (6/28/2017)   - treated with calcium, insulin, glucose and bicarb and albuterol in ED   - EKG with peaked T waves      DM type 2 (diabetes mellitus, type 2) ([facility] (6/28/2017)   - no reported complications but suspect she had CKD prior to this   - stop OHA and metformin given ARF   - will follow with SSI and start low dose Lantus tonight   - check A1c      Anemia (6/28/2017)   - suspect anemia of chronic renal disease   - anemia labs ordered       Code status:   - patient is FULL CODE      Total time spent with patient: [address] [facility] discussed with: Patient and Family    Discussed:  Code Status, Care Plan and D/C Planning    Prophylaxis:  Hep SQ and SCD's    Probable Disposition:  Home w/Family           ___________________________________________________    Attending Physician: Eric Robertson MD

## 2017-06-28 NOTE — ED PROVIDER NOTES
HPI Comments: Pt arrives to ED with c/o of \"uncontrolled diabetes\" x one week. Patient reports BG high of 200 and low of 50. Patient felt nauseous this morning. Pt states that she just got back in town from visiting her daughter in [place]. Pt states that she has had N/V/D off and on for the past week and half. Patient is a 62 y.o. female presenting with hyperglycemia. The history is provided by the patient. No  was used. High Blood Sugar    This is a new problem. The current episode started more than 1 week ago. The problem occurs daily. The problem has not changed since onset. The pain is located in the generalized abdominal region. The quality of the pain is cramping. The pain is at a severity of 3/10. The pain is mild. Associated symptoms include diarrhea, nausea and vomiting. Pertinent negatives include no fever, no constipation, no dysuria, no hematuria, no myalgias, no chest pain and no back pain. Nothing worsens the pain. The pain is relieved by nothing. Her past medical history is significant for DM. Her past medical history does not include PUD, GERD or UTI. The patient's surgical history non-contributory. Past Medical History:   Diagnosis Date    Diabetes ([address]     Hypertension        Past Surgical History:   Procedure Laterality Date    HX GYN      c section         Family History:   Problem Relation Age of Onset    Alzheimer Mother     Lupus Father     Kidney Disease Other      uncle, niece, cousin       Social History     Social History    Marital status:      Spouse name: N/A    Number of children: N/A    Years of education: N/A     Occupational History    Not on file.      Social History Main Topics    Smoking status: Never Smoker    Smokeless tobacco: Never Used    Alcohol use Yes      Comment: wine accosinaly    Drug use: No    Sexual activity: Not on file     Other Topics Concern    Not on file     Social History Narrative    No narrative on file     ALLERGIES: Review of patient's allergies indicates no known allergies. Review of Systems   Constitutional: Negative for appetite change, chills, fever and unexpected weight change. HENT: Negative for ear pain, hearing loss, rhinorrhea and trouble swallowing. Eyes: Negative for pain and visual disturbance. Respiratory: Negative for cough, chest tightness and shortness of breath. Cardiovascular: Negative for chest pain and palpitations. Gastrointestinal: Positive for abdominal pain, diarrhea, nausea and vomiting. Negative for abdominal distention, blood in stool and constipation. Genitourinary: Negative for dysuria, hematuria and urgency. Musculoskeletal: Negative for back pain and myalgias. Skin: Negative for rash. Neurological: Negative for dizziness, syncope, weakness and numbness. Psychiatric/Behavioral: Negative for confusion and suicidal ideas. All other systems reviewed and are negative. Vitals:    06/28/17 0530 06/28/17 0711 06/28/17 0713   BP: 171/84 184/82 184/82   Pulse: 84 81    Resp: 14     Temp: 98.5 °F (36.9 °C)     SpO2: 100%  100%   Weight: 90.7 kg (200 lb)     Height: 5' 7\" (1.702 m)              Physical Exam   Constitutional: She is oriented to person, place, and time. She appears well-developed and well-nourished. No distress. HENT:   Head: Normocephalic and atraumatic. Right Ear: External ear normal.   Left Ear: External ear normal.   Nose: Nose normal.   Mouth/Throat: Oropharynx is clear and moist. Mucous membranes are dry (mildly). No oropharyngeal exudate. Eyes: Conjunctivae and EOM are normal. Pupils are equal, round, and reactive to light. Right eye exhibits no discharge. Left eye exhibits no discharge. No scleral icterus. Neck: Normal range of motion. Neck supple. No JVD present. No tracheal deviation present. Cardiovascular: Normal rate, regular rhythm, normal heart sounds and intact distal pulses.   Exam reveals no gallop and no friction rub.    No murmur heard. Pulmonary/Chest: Effort normal and breath sounds normal. No stridor. No respiratory distress. She has no decreased breath sounds. She has no wheezes. She has no rhonchi. She has no rales. She exhibits no tenderness. Abdominal: Soft. Bowel sounds are normal. She exhibits no distension. There is generalized tenderness (minimally). There is no rebound and no guarding. Musculoskeletal: Normal range of motion. She exhibits no edema or tenderness. Neurological: She is alert and oriented to person, place, and time. She has normal strength and normal reflexes. No cranial nerve deficit or sensory deficit. She exhibits normal muscle tone. Coordination normal. GCS eye subscore is 4. GCS verbal subscore is 5. GCS motor subscore is 6. Skin: Skin is warm and dry. No rash noted. She is not diaphoretic. No erythema. No pallor. Psychiatric: She has a normal mood and affect. Her behavior is normal. Judgment and thought content normal.   Nursing note and vitals reviewed. MDM  Number of Diagnoses or Management Options  Acute hyperkalemia:   Acute renal failure, unspecified acute renal failure type [facility]):    Anemia, unspecified type:   Elevated lipase:   Fatigue, unspecified type:   Hyperglycemia:      Amount and/or Complexity of Data Reviewed  Clinical lab tests: ordered and reviewed  Tests in the radiology section of CPT®: ordered  Tests in the medicine section of CPT®: ordered and reviewed  Independent visualization of images, tracings, or specimens: yes (ekg)    Risk of Complications, Morbidity, and/or Mortality  Presenting problems: moderate  Diagnostic procedures: moderate  Management options: high    Critical Care  Total time providing critical care: 30-74 minutes (50 minutes of CCT regardless of any procedures that might have been done.)    Patient Progress  Patient progress: stable    ED Course       Procedures  Chief Complaint   Patient presents with    High Blood Sugar       7:40 AM  The patients presenting problems have been discussed, and they are in agreement with the care plan formulated and outlined with them. I have encouraged them to ask questions as they arise throughout their visit.     MEDICATIONS GIVEN:  Medications   sodium chloride (NS) flush 5-10 mL (10 mL IntraVENous Given 6/28/17 0611)   sodium chloride (NS) flush 5-10 mL (not administered)   albuterol (PROVENTIL VENTOLIN) nebulizer solution 10 mg (10 mg Nebulization New Bag 6/28/17 0711)   calcium gluconate 1 g in 0.9% sodium chloride 100 mL IVPB (not administered)   sodium polystyrene (KAYEXALATE) 15 gram/60 mL oral suspension 45 g (not administered)   sodium chloride 0.9 % bolus infusion 1,000 mL (1,000 mL IntraVENous New Bag 6/28/17 0609)   ondansetron (ZOFRAN) injection 4 mg (4 mg IntraVENous Given 6/28/17 0611)   famotidine (PF) (PEPCID) injection 20 mg (20 mg IntraVENous Given 6/28/17 0611)   dicyclomine (BENTYL) capsule 10 mg (10 mg Oral Given 6/28/17 0610)   insulin regular (NOVOLIN R, HUMULIN R) injection 10 Units (10 Units IntraVENous Given 6/28/17 0714)   dextrose (D50W) injection syrg 25 g (25 g IntraVENous Given 6/28/17 0733)   sodium bicarbonate 8.4 % (1 mEq/mL) injection 50 mEq (50 mEq IntraVENous Given 6/28/17 0733)       LABS REVIEWED:  Recent Results (from the past 24 hour(s))   GLUCOSE, POC    Collection Time: 06/28/17  5:46 AM   Result Value Ref Range    Glucose (POC) 251 (H) 65 - 100 mg/dL    Performed by Elicia Morton (PCT)    URINALYSIS W/MICROSCOPIC    Collection Time: 06/28/17  6:00 AM   Result Value Ref Range    Color YELLOW/STRAW      Appearance CLOUDY (A) CLEAR      Specific gravity 1.012 1.003 - 1.030      pH (UA) 6.0 5.0 - 8.0      Protein TRACE (A) NEG mg/dL    Glucose 100 (A) NEG mg/dL    Ketone NEGATIVE  NEG mg/dL    Bilirubin NEGATIVE  NEG      Blood SMALL (A) NEG      Urobilinogen 0.2 0.2 - 1.0 EU/dL    Nitrites NEGATIVE  NEG      Leukocyte Esterase MODERATE (A) NEG      WBC 10-20 0 - 4 /hpf RBC 0-5 0 - 5 /hpf    Epithelial cells FEW FEW /lpf    Bacteria 1+ (A) NEG /hpf   CBC WITH AUTOMATED DIFF    Collection Time: 06/28/17  6:03 AM   Result Value Ref Range    WBC 9.9 3.6 - 11.0 K/uL    RBC 3.20 (L) 3.80 - 5.20 M/uL    HGB 8.8 (L) 11.5 - 16.0 g/dL    HCT 27.6 (L) 35.0 - 47.0 %    MCV 86.3 80.0 - 99.0 FL    MCH 27.5 26.0 - 34.0 PG    MCHC 31.9 30.0 - 36.5 g/dL    RDW 14.3 11.5 - 14.5 %    PLATELET 887 121 - 539 K/uL    NEUTROPHILS 71 32 - 75 %    LYMPHOCYTES 23 12 - 49 %    MONOCYTES 4 (L) 5 - 13 %    EOSINOPHILS 2 0 - 7 %    BASOPHILS 0 0 - 1 %    ABS. NEUTROPHILS 6.9 1.8 - 8.0 K/UL    ABS. LYMPHOCYTES 2.3 0.8 - 3.5 K/UL    ABS. MONOCYTES 0.4 0.0 - 1.0 K/UL    ABS. EOSINOPHILS 0.2 0.0 - 0.4 K/UL    ABS. BASOPHILS 0.0 0.0 - 0.1 K/UL   METABOLIC PANEL, COMPREHENSIVE    Collection Time: 06/28/17  6:03 AM   Result Value Ref Range    Sodium 131 (L) 136 - 145 mmol/L    Potassium 7.4 (HH) 3.5 - 5.1 mmol/L    Chloride 98 97 - 108 mmol/L    CO2 19 (L) 21 - 32 mmol/L    Anion gap 14 5 - 15 mmol/L    Glucose 251 (H) 65 - 100 mg/dL    BUN 89 (H) 6 - 20 MG/DL    Creatinine 11.18 (H) 0.55 - 1.02 MG/DL    BUN/Creatinine ratio 8 (L) 12 - 20      GFR est AA 4 (L) >60 ml/min/1.73m2    GFR est non-AA 3 (L) >60 ml/min/1.73m2    Calcium 8.8 8.5 - 10.1 MG/DL    Bilirubin, total 0.5 0.2 - 1.0 MG/DL    ALT (SGPT) 15 12 - 78 U/L    AST (SGOT) 12 (L) 15 - 37 U/L    Alk.  phosphatase 81 45 - 117 U/L    Protein, total 7.7 6.4 - 8.2 g/dL    Albumin 3.1 (L) 3.5 - 5.0 g/dL    Globulin 4.6 (H) 2.0 - 4.0 g/dL    A-G Ratio 0.7 (L) 1.1 - 2.2     LIPASE    Collection Time: 06/28/17  6:03 AM   Result Value Ref Range    Lipase 903 (H) 73 - 393 U/L       VITAL SIGNS:  Patient Vitals for the past 12 hrs:   Temp Pulse Resp BP SpO2   06/28/17 0713 - - - 184/82 100 %   06/28/17 0711 - 81 - 184/82 -   06/28/17 0530 98.5 °F (36.9 °C) 84 14 171/84 100 %       RADIOLOGY RESULTS:  The following have been ordered and reviewed:  US ABD COMP (Results Pending)     ED EKG interpretation:  Rhythm: normal sinus rhythm and 1st degree block; and regular . Rate (approx.): 77; Axis: normal; P wave: normal; QRS interval: normal ; ST/T wave: normal; Other findings: normal. This EKG was interpreted by Aysha Clark DO,ED Provider. CONSULTATIONS:   Hospitalist and nephrology    PROGRESS NOTES:  Discussed results and plan with patient. Patient will be admitted/observed for further evaluation and treatment. DIAGNOSIS:    1. Acute renal failure, unspecified acute renal failure type ([address]    2. Acute hyperkalemia    3. Fatigue, unspecified type    4. Hyperglycemia    5. Anemia, unspecified type    6. Elevated lipase        PLAN:  Admit    ED COURSE: The patients hospital course has been uncomplicated.

## 2017-06-28 NOTE — ED NOTES
Dr. Shelia Davidson called from radiology with acute cholecystitis on ultrasound. Dr. Leonard Otoole notified.

## 2017-06-28 NOTE — CONSULTS
Gior[address] Horn [address]   [address] [facility], [address] [place] [address]   [address] [place] [address]       Name:  Iris Bird   MR#:  [id number]   :  1955   Account #:  [de-identified]    Date of Consultation:  2017   Date of Adm:  2017       REQUESTING PHYSICIAN: Reilly Fang MD    REASON FOR CONSULTATION: Management of acute kidney injury. HISTORY OF PRESENT ILLNESS: The patient is a very pleasant 62  year-old [facility] American woman who has a longstanding history of   diabetes mellitus. The patient presented to the emergency room last   night with complaints of a prolonged illness with symptoms of nausea,   vomiting, diarrhea, weakness, episodes of hypoglycemia, stomach   indigestion, not feeling well, generalized weakness, and weakness of   the lower extremities. The patient's illness started somewhere in the   beginning of . It initially presented only with upper gastrointestinal   symptoms. The patient believes that she ate something poisonous. She   took a few days off of work and took some symptom-relieving   medications. The patient felt better for a while and she resumed her   work. The patient's symptoms came back worse, and she experienced   hypoglycemic episodes with blood sugar down to 50. The patient was   seen by her primary care physician, who discontinued her Glipizide. The patient took a trip to [place], during which she felt sick almost   the entire time. Upon her arrival back, yesterday, she made the   decision to come to the emergency room. The initial evaluation revealed she has significantly bad kidney function   with creatinine above 11. The patient initially denied any knowledge of   renal disease, but later during my interview with her, she recalled that   2 years ago, she was told that she had leaky kidneys.  The patient   reports being seen by her primary care physician on a regular   basis, but she was never told that she had problems with her kidneys. PAST MEDICAL HISTORY: Diabetes mellitus and hypertension. PAST SURGICAL HISTORY:  section. 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 She denies alcohol, tobacco or illicit   drug abuse. She lives with her . FAMILY HISTORY: The patient had an aunt and uncle on her   maternal side who had advance chronic kidney disease. Father with   lupus. Mother with Alzheimer's. ALLERGIES: ALLERGIC TO PENICILLIN. MEDICATIONS   List at home consists of:    1. Glucophage 1 gram twice a day. 2. Glipizide 10 mg twice a day. 3. Triamterene/hydrochlorothiazide 37.5/25 mg once a day. 4. Lisinopril 30 mg once a day. 5. Zofran when needed. 6. Estradiol/norethindrone 0.5/0.1 mg once a day. REVIEW OF SYSTEMS   CONSTITUTIONAL: Complains of generalized fatigue, weakness of   lower extremities. HEENT: No auditory or visual disturbances. The patient is seen   regularly by her ophthalmologist, once a year. Today, was her   scheduled appointment for routine followup. No sore throat. No ear   aches. NECK: No stiffness or odynophagia. RESPIRATORY: No shortness of breath, cough, sputum production or   hemoptysis. CARDIOVASCULAR: No chest pain, palpitations. GASTROINTESTINAL: As outlined in the history of present illness. ENDOCRINOLOGY: The patient describes typical cold sweats and   weakness from hypoglycemia. She denies heat or cold intolerance. MUSCULOSKELETAL: The patient has weakness of all of her   extremities, which seems to be episodic. She denies joint pain. HEMATOLOGY/ONCOLOGY: The patient was diagnosed with anemia   with her pregnancy 24 years ago. She reports feeling cold. No   excessive bleeding. NEUROPSYCHIATRIC: The patient has some dizziness,   lightheadedness. She reports weakness and feeling of detachment. She denies seizures.     PHYSICAL EXAM   GENERAL: Very pleasant middle-aged  woman who   is sitting on the gurney. She does not appear to be in any acute   distress. She is able to provide adequate history. The patient's    is at bedside. The patient is well built and nourished. She is pale. VITAL SIGNS:  Blood pressure is 165/75, heart rate 89, temperature   98.5. HEENT: Head is Normocephalic. Eyes with anicteric sclera. Ears and   nose without abnormal discharge. Mouth with moist oral mucosa. NECK: Supple with no increased JVP, carotid bruit or thyromegaly. LUNGS: Clear to auscultation on anterior examination. HEART: With S1. Without S2. With friction, rub or gallop. ABDOMEN: Soft with positive bowel sounds. EXTREMITIES: With no edema, cyanosis or clubbing. SKIN: Dry with slightly diminished turgor. No rashes or skin breaks. NEUROLOGICAL: Nonfocal. The patient is awake, alert and oriented   to time, self and place. LABORATORY DATA: Sodium 131, potassium 7.4, CO2 is 29, anion   gap 12, BUN 89, creatinine 11.18. White blood cell count 9.9,   hemoglobin 8.8, platelets 431. Urinalysis is comparable with urinary   tract infection. IMPRESSION    1. Acute kidney injury from effective intravascular volume contraction   related to prolonged gastrointestinal illness. The patient had a poor   oral intake and she reports nausea, vomiting and diarrhea. The fact   that she is anemic and the strongly positive family history of chronic   kidney disease, suggests the patient may have underlying chronic   kidney disease from diabetic nephropathy, which has not been   recognized so far. Electrolytes with critical hyperkalemia and mild   hyponatremia comparable with acute kidney injury and use of   potassium-sparing diuretic and ACE inhibitor. The patient is acidotic. 2. Anemia of unclear etiology. There is a suspicion of the patient   having chronic kidney disease which has not been diagnosed so far.  In   that case, the patient most probably has anemia of chronic kidney disease with low production of endogenous erythropoietin. RECOMMENDATIONS    1. The patient will need immediate hydration. Bicarb drip will be the   best solution at this point, which will help with intracellular shift of   plasma potassium and correction of hyperkalemia. 2. Hold metformin immediately due to the risk of lactic acidosis. 3. Initial renal evaluation, which will include urine studies, renal panel,   and a daily base. Ultrasound of the kidneys to assess echogenicity and   symmetry. Screening for secondary hyperparathyroidism. The patient   does not require immediate renal replacement therapy. She is clinically   stable and we will most probably will be able to correct her   hyperkalemia conservatively at this point. 4. Anemia management. Assess iron profile. Check CBC again   tomorrow. I am expecting a further drop in the patient's hemoglobin   after hydration. Avoid any unnecessary nephrotoxic agents. The   patient's diet needs to be changed to diabetic renal.     Thank you very much for the opportunity to be a part of this patient's   care. The case was discussed with the patient, nurse, emergency room   physician and the patient's . Our service will follow up with   you.     1.           Garett Chavez MD      [facility] / S   D:  06/28/2017   09:45   T:  06/28/2017   12:26   Job #:  [id number]

## 2017-06-28 NOTE — ED TRIAGE NOTES
Pt arrives to ED with c/o of \"uncontrolled diabetes\" x one week. Patient reports BG high of 200 and low of 50. Patient felt nauseous this morning.

## 2017-06-29 PROBLEM — E66.9 OBESITY (BMI 30.0-34.9): Chronic | Status: ACTIVE | Noted: 2017-06-29

## 2017-06-29 LAB
ALBUMIN SERPL BCP-MCNC: 2.3 G/DL (ref 3.5–5)
ALBUMIN/GLOB SERPL: 0.7 {RATIO} (ref 1.1–2.2)
ALP SERPL-CCNC: 59 U/L (ref 45–117)
ALT SERPL-CCNC: 13 U/L (ref 12–78)
ANION GAP BLD CALC-SCNC: 12 MMOL/L (ref 5–15)
AST SERPL W P-5'-P-CCNC: 10 U/L (ref 15–37)
BILIRUB SERPL-MCNC: 0.3 MG/DL (ref 0.2–1)
BUN SERPL-MCNC: 84 MG/DL (ref 6–20)
BUN/CREAT SERPL: 8 (ref 12–20)
CALCIUM SERPL-MCNC: 7.6 MG/DL (ref 8.5–10.1)
CALCIUM SERPL-MCNC: 7.7 MG/DL (ref 8.5–10.1)
CHLORIDE SERPL-SCNC: 101 MMOL/L (ref 97–108)
CO2 SERPL-SCNC: 27 MMOL/L (ref 21–32)
CREAT SERPL-MCNC: 10.94 MG/DL (ref 0.55–1.02)
ERYTHROCYTE [DISTWIDTH] IN BLOOD BY AUTOMATED COUNT: 14.5 % (ref 11.5–14.5)
ERYTHROCYTE [DISTWIDTH] IN BLOOD BY AUTOMATED COUNT: 14.7 % (ref 11.5–14.5)
GLOBULIN SER CALC-MCNC: 3.5 G/DL (ref 2–4)
GLUCOSE BLD STRIP.AUTO-MCNC: 101 MG/DL (ref 65–100)
GLUCOSE BLD STRIP.AUTO-MCNC: 187 MG/DL (ref 65–100)
GLUCOSE BLD STRIP.AUTO-MCNC: 198 MG/DL (ref 65–100)
GLUCOSE BLD STRIP.AUTO-MCNC: 229 MG/DL (ref 65–100)
GLUCOSE BLD STRIP.AUTO-MCNC: 64 MG/DL (ref 65–100)
GLUCOSE BLD STRIP.AUTO-MCNC: 73 MG/DL (ref 65–100)
GLUCOSE SERPL-MCNC: 116 MG/DL (ref 65–100)
HCT VFR BLD AUTO: 20.8 % (ref 35–47)
HCT VFR BLD AUTO: 24 % (ref 35–47)
HGB BLD-MCNC: 6.7 G/DL (ref 11.5–16)
HGB BLD-MCNC: 7.4 G/DL (ref 11.5–16)
MAGNESIUM SERPL-MCNC: 2.3 MG/DL (ref 1.6–2.4)
MCH RBC QN AUTO: 27 PG (ref 26–34)
MCH RBC QN AUTO: 27.8 PG (ref 26–34)
MCHC RBC AUTO-ENTMCNC: 30.8 G/DL (ref 30–36.5)
MCHC RBC AUTO-ENTMCNC: 32.2 G/DL (ref 30–36.5)
MCV RBC AUTO: 86.3 FL (ref 80–99)
MCV RBC AUTO: 87.6 FL (ref 80–99)
PHOSPHATE SERPL-MCNC: 7.3 MG/DL (ref 2.6–4.7)
PLATELET # BLD AUTO: 281 K/UL (ref 150–400)
PLATELET # BLD AUTO: 327 K/UL (ref 150–400)
POTASSIUM SERPL-SCNC: 4.7 MMOL/L (ref 3.5–5.1)
PROT SERPL-MCNC: 5.8 G/DL (ref 6.4–8.2)
PTH-INTACT SERPL-MCNC: 408.3 PG/ML (ref 14–72)
RBC # BLD AUTO: 2.41 M/UL (ref 3.8–5.2)
RBC # BLD AUTO: 2.74 M/UL (ref 3.8–5.2)
SERVICE CMNT-IMP: ABNORMAL
SERVICE CMNT-IMP: NORMAL
SODIUM SERPL-SCNC: 140 MMOL/L (ref 136–145)
WBC # BLD AUTO: 6.4 K/UL (ref 3.6–11)
WBC # BLD AUTO: 7.2 K/UL (ref 3.6–11)

## 2017-06-29 PROCEDURE — 74011000258 HC RX REV CODE- 258: Performed by: INTERNAL MEDICINE

## 2017-06-29 PROCEDURE — 84100 ASSAY OF PHOSPHORUS: CPT | Performed by: INTERNAL MEDICINE

## 2017-06-29 PROCEDURE — 86923 COMPATIBILITY TEST ELECTRIC: CPT | Performed by: INTERNAL MEDICINE

## 2017-06-29 PROCEDURE — 85027 COMPLETE CBC AUTOMATED: CPT | Performed by: INTERNAL MEDICINE

## 2017-06-29 PROCEDURE — 82962 GLUCOSE BLOOD TEST: CPT

## 2017-06-29 PROCEDURE — 80053 COMPREHEN METABOLIC PANEL: CPT | Performed by: INTERNAL MEDICINE

## 2017-06-29 PROCEDURE — 83735 ASSAY OF MAGNESIUM: CPT | Performed by: INTERNAL MEDICINE

## 2017-06-29 PROCEDURE — 86900 BLOOD TYPING SEROLOGIC ABO: CPT | Performed by: INTERNAL MEDICINE

## 2017-06-29 PROCEDURE — 83970 ASSAY OF PARATHORMONE: CPT | Performed by: INTERNAL MEDICINE

## 2017-06-29 PROCEDURE — 74011250637 HC RX REV CODE- 250/637: Performed by: INTERNAL MEDICINE

## 2017-06-29 PROCEDURE — 74011636637 HC RX REV CODE- 636/637: Performed by: INTERNAL MEDICINE

## 2017-06-29 PROCEDURE — 36415 COLL VENOUS BLD VENIPUNCTURE: CPT | Performed by: INTERNAL MEDICINE

## 2017-06-29 PROCEDURE — 74011000250 HC RX REV CODE- 250: Performed by: INTERNAL MEDICINE

## 2017-06-29 PROCEDURE — 74011250636 HC RX REV CODE- 250/636: Performed by: INTERNAL MEDICINE

## 2017-06-29 PROCEDURE — 65660000000 HC RM CCU STEPDOWN

## 2017-06-29 RX ORDER — DOXERCALCIFEROL 2.5 UG/1
2.5 CAPSULE ORAL DAILY
Status: DISCONTINUED | OUTPATIENT
Start: 2017-06-30 | End: 2017-07-03 | Stop reason: HOSPADM

## 2017-06-29 RX ORDER — SEVELAMER HYDROCHLORIDE 800 MG/1
1600 TABLET, FILM COATED ORAL
Status: DISCONTINUED | OUTPATIENT
Start: 2017-06-29 | End: 2017-07-03 | Stop reason: HOSPADM

## 2017-06-29 RX ADMIN — HEPARIN SODIUM 5000 UNITS: 5000 INJECTION, SOLUTION INTRAVENOUS; SUBCUTANEOUS at 22:22

## 2017-06-29 RX ADMIN — Medication 10 ML: at 13:37

## 2017-06-29 RX ADMIN — Medication 10 ML: at 22:22

## 2017-06-29 RX ADMIN — INSULIN GLARGINE 10 UNITS: 100 INJECTION, SOLUTION SUBCUTANEOUS at 22:22

## 2017-06-29 RX ADMIN — HEPARIN SODIUM 5000 UNITS: 5000 INJECTION, SOLUTION INTRAVENOUS; SUBCUTANEOUS at 06:28

## 2017-06-29 RX ADMIN — HEPARIN SODIUM 5000 UNITS: 5000 INJECTION, SOLUTION INTRAVENOUS; SUBCUTANEOUS at 13:40

## 2017-06-29 RX ADMIN — SODIUM CHLORIDE: 450 INJECTION, SOLUTION INTRAVENOUS at 16:02

## 2017-06-29 RX ADMIN — RENAGEL 1600 MG: 800 TABLET ORAL at 15:02

## 2017-06-29 RX ADMIN — ASPIRIN 81 MG: 81 TABLET, COATED ORAL at 08:58

## 2017-06-29 RX ADMIN — RENAGEL 1600 MG: 800 TABLET ORAL at 17:26

## 2017-06-29 RX ADMIN — INSULIN LISPRO 1 UNITS: 100 INJECTION, SOLUTION INTRAVENOUS; SUBCUTANEOUS at 22:23

## 2017-06-29 RX ADMIN — Medication 10 ML: at 05:56

## 2017-06-29 RX ADMIN — SODIUM CHLORIDE: 450 INJECTION, SOLUTION INTRAVENOUS at 01:29

## 2017-06-29 NOTE — PROGRESS NOTES
Name: Andrea Morales MRN: [id number]   : 1955 Hospital: [facility]   Date: 2017        IMPRESSION:   · CKD stage 5- approaching ESRD. Patient's renal US appearance, the anemia with normal iron profile, increased PTH and h/o HTN and DM are supportive of CKD. Patient's creatinine did not improve with hydration. · Nausea, low energy are likely due to uremia. · Critical hyperkalemia- resolved with alkalinization. · Anemia of CKD. · Secondary hyperparathyroidism from CKD. · Brittle DM with recent episodes of hypoglycemia- related to advanced CKD and decreased insulin clearance. PLAN:   · Patient does not need immediate RRT, but if her creatinine remains in the 10+ level HD should be initiated before her discharge from the hospital. Patient is at risk of acute deterioration as outpatient if RRT is not started. · Anemia management- Epogen and iron. Iron is expected to decrease due to utilization after Epgen is started. · Calcitriol for MBD management. · Start Nexium  · Acute hepatitis profile. · Renal diet. · Renal panel in AM.  · May need to resume diuretics- patient has early volume expansion. Subjective/Interval History:   I have reviewed the flowsheet and previous days notes.     ROS:A comprehensive review of systems was negative except for: Constitutional: positive for fatigue and anorexia  Eyes: positive for swelling of eye lids  Gastrointestinal: positive for dysphagia, dyspepsia and nausea    Objective:   Vital Signs:    Visit Vitals    /77 (BP 1 Location: Left arm)    Pulse 72    Temp 96.9 °F (36.1 °C)    Resp 16    Ht 5' 7\" (1.702 m)    Wt 90.7 kg (200 lb)    SpO2 99%    BMI 31.32 kg/m2       O2 Device: Room air       Temp (24hrs), Av °F (36.7 °C), Min:96.9 °F (36.1 °C), Max:98.7 °F (37.1 °C)       Intake/Output:   Last shift:      701 - 1900  In: 120 [P.O.:120]  Out: -   Last 3 shifts: 1901 -  07  In: 6658 [P.O.:1140; I.V.:2130]  Out: 500 [Urine:500]    Intake/Output Summary (Last 24 hours) at 06/29/17 1325  Last data filed at 06/29/17 7784   Gross per 24 hour   Intake             3390 ml   Output              500 ml   Net             2890 ml        Physical Exam:  General:    Alert, cooperative, no distress, appears stated age. Head:   Normocephalic, without obvious abnormality, atraumatic. Eyes:   Conjunctivae/corneas clear. Periorbital edema is noted. Nose:  Nares normal. No drainage or sinus tenderness. Throat:    Lips, mucosa, and tongue normal.    Neck:  Supple, symmetrical,  no adenopathy, thyroid: non tender    no carotid bruit and no JVD. Lungs:   Clear to auscultation bilaterally. No Wheezing or Rhonchi. No rales. Chest wall:  No tenderness or deformity. No Accessory muscle use. Heart:   Regular rate and rhythm,  no murmur, rub or gallop. Abdomen:   Soft, non-tender. Not distended. Bowel sounds normal. No masses  Extremities: Extremities normal, atraumatic, No cyanosis. No edema. No clubbing  Skin:     Texture, turgor normal. No rashes or lesions. Not Jaundiced  Psych:  Good insight. Not depressed. Not anxious or agitated. Neurologic: Normal strength, Alert and oriented X 3.        DATA:  Labs:  Recent Labs      06/29/17   0409  06/28/17   0603   NA  140  131*   K  4.7  7.4*   CL  101  98   CO2  27  19*   BUN  84*  89*   CREA  10.94*  11.18*   CA  7.7*  7.6*  8.8   ALB  2.3*  3.1*   PHOS  7.3*  6.4*   MG  2.3  2.7*     Recent Labs      06/29/17   0848  06/29/17   0409  06/28/17   0603   WBC  7.2  6.4  9.9   HGB  7.4*  6.7*  8.8*   HCT  24.0*  20.8*  27.6*   PLT  327  281  383     Recent Labs      06/28/17   0920   CREAU  61.24       Total time spent with patient:  35 minutes    [] Critical Care Provided    Care Plan discussed with:   Family, Staff, Medical Team    Merlin Dutch, MD

## 2017-06-29 NOTE — DIABETES MGMT
[facility]    Progress Note     Recommendations/ Comments: Chart reviewed for low blood glucose ( < 80 mg/dL x 1 in the past 24 hours) . Mark Monteiro is a 62 y.o. female with a past medical history significant for DM per Dr. Mabel Huerta MD's H&P dated 6/28/2017. Fasting glucose today: 64 mg/dL (per POCT Glucose). Required 4 units of correction in the past 24 hours. If appropriate, please consider changing Lantus administration to morning and decreasing it to 8 units - 20%. This would decrease the risk for nocturnal hypoglycemia. Recent Glucose Results:   Lab Results   Component Value Date/Time     (H) 06/29/2017 04:09 AM    GLUCPOC 187 (H) 06/29/2017 11:33 AM    GLUCPOC 101 (H) 06/29/2017 08:16 AM    GLUCPOC 73 06/29/2017 07:58 AM        Hospital (inpatient) medications:  1. Correction Scale: Lispro (Humalog) High Sensitivity scale (thin, ESRD) to cover for glucose > 199 mg/dL  before meals and for glucose >199 at bedtime      2. Lantus 10 units     Prior to admission medications: per past medical records  Metformin 1000 mg twice a day with meals       Lab Results   Component Value Date/Time    Hemoglobin A1c 8.2 06/28/2017 09:20 AM     Estimated Creatinine Clearance: 6.3 mL/min (based on Cr of 10.94). Active Orders   There are no active orders of the following type(s): Diet. Thank you. Dakotah Rogers. Jd Baptiste, BSN, MPH  Diabetes [address] [place] [address]  [phone]    -For most hospitalized persons with hyperglycemia in the intensive care unit (ICU), a glucose range of 140 to 180 mg/dL is recommended, provided this target can be safely achieved. *  - For general medicine and surgery patients in non-ICU settings, a premeal glucose target <140 mg/dL and a random blood glucose <180 mg/dL are recommended. *    TUYET Jean, Anabel Kwong., Rory Lim., ... & Irvin Oconnor ([id number]).  AMERICAN ASSOCIATION OF CLINICAL ENDOCRINOLOGISTS AND AMERICAN COLLEGE OF ENDOCRINOLOGY-CLINICAL PRACTICE GUIDELINES FOR DEVELOPING A DIABETES MELLITUS COMPREHENSIVE CARE PLAN-2015-EXECUTIVE SUMMARY: Complete guidelines are available at [email or web address]. com/publications/guidelines. Endocrine Practice, 21(4), [id number].

## 2017-06-29 NOTE — PROGRESS NOTES
6- CASE MANAGEMENT NOTE:  I have reviewed the pt's EMR and will follow and assist with discharge needs as indicated.  SOBIA Camilo, CM

## 2017-06-29 NOTE — PROGRESS NOTES
Giorgio Horn naomie [address]  [address] [facility], [address] [place] [address]  [phone]      Medical Progress Note      NAME: Lisa Morton   :    MRM:  [id number]    Date/Time: 2017  8:02 AM         Subjective:     Chief Complaint:  Nausea: mild to moderate, improved from admission, constant, no emesis    ROS:  (bold if positive, if negative)                        Tolerating Diet          Objective:       Vitals:          Last 24hrs VS reviewed since prior progress note.  Most recent are:    Visit Vitals    /84 (BP 1 Location: Left arm, BP Patient Position: At rest)    Pulse 79    Temp 97.9 °F (36.6 °C)    Resp 18    Ht 5' 7\" (1.702 m)    Wt 90.7 kg (200 lb)    SpO2 100%    BMI 31.32 kg/m2     SpO2 Readings from Last 6 Encounters:   17 100%            Intake/Output Summary (Last 24 hours) at 17 0803  Last data filed at 17 3813   Gross per 24 hour   Intake             3270 ml   Output              500 ml   Net             2770 ml          Exam:     Physical Exam:    Gen:  Well-developed, obese, in no acute distress  HEENT:  Pink conjunctivae, PERRL, hearing intact to voice, moist mucous membranes  Neck:  Supple, without masses, thyroid non-tender  Resp:  No accessory muscle use, clear breath sounds without wheezes rales or rhonchi  Card:  No murmurs, normal S1, S2 without thrills, bruits or peripheral edema, 2+ pedal pulses  Abd:  Soft, non-tender, non-distended, normoactive bowel sounds are present, no palpable organomegaly and no detectable hernias  Lymph:  No cervical or inguinal adenopathy  Musc:  No cyanosis or clubbing  Skin:  No rashes or ulcers, skin turgor is good, cap refill <2 sec  Neuro:  Cranial nerves are grossly intact, no focal motor weakness, follows commands appropriately  Psych:  Good insight, oriented to person, place and time, alert       Telemetry reviewed:   normal sinus rhythm    Medications Reviewed: (see below)    Lab Data Reviewed: (see below)    ______________________________________________________________________    Medications:     Current Facility-Administered Medications   Medication Dose Route Frequency    sodium chloride (NS) flush 5-10 mL  5-10 mL IntraVENous Q8H    sodium chloride (NS) flush 5-10 mL  5-10 mL IntraVENous PRN    acetaminophen (TYLENOL) tablet 650 mg  650 mg Oral Q4H PRN    oxyCODONE-acetaminophen (PERCOCET) 5-325 mg per tablet 1 Tab  1 Tab Oral Q4H PRN    HYDROmorphone (PF) (DILAUDID) injection 0.5 mg  0.5 mg IntraVENous Q4H PRN    prochlorperazine (COMPAZINE) injection 10 mg  10 mg IntraVENous Q6H PRN    zolpidem (AMBIEN) tablet 5 mg  5 mg Oral QHS PRN    insulin lispro (HUMALOG) injection   SubCUTAneous AC&HS    glucose chewable tablet 16 g  4 Tab Oral PRN    dextrose (D50W) injection syrg 12.5-25 g  12.5-25 g IntraVENous PRN    glucagon (GLUCAGEN) injection 1 mg  1 mg IntraMUSCular PRN    heparin (porcine) injection 5,000 Units  5,000 Units SubCUTAneous Q8H    insulin glargine (LANTUS) injection 10 Units  10 Units SubCUTAneous QHS    0.45% sodium chloride 1,000 mL with sodium bicarbonate (8.4%) 100 mEq infusion   IntraVENous CONTINUOUS    aspirin delayed-release tablet 81 mg  81 mg Oral DAILY    [START ON 6/30/2017] loratadine (CLARITIN) tablet 10 mg  10 mg Oral EVERY OTHER DAY            Lab Review:     Recent Labs      06/29/17   0409  06/28/17   0603   WBC  6.4  9.9   HGB  6.7*  8.8*   HCT  20.8*  27.6*   PLT  281  383     Recent Labs      06/29/17   0409  06/28/17   0603   NA  140  131*   K  4.7  7.4*   CL  101  98   CO2  27  19*   GLU  116*  251*   BUN  84*  89*   CREA  10.94*  11.18*   CA  7.6*  8.8   MG  2.3  2.7*   PHOS  7.3*  6.4*   ALB  2.3*  3.1*   TBILI  0.3  0.5   SGOT  10*  12*   ALT  13  15   INR   --   1.0     Lab Results   Component Value Date/Time    Glucose (POC) 73 06/29/2017 07:58 AM    Glucose (POC) 64 06/29/2017 07:31 AM    Glucose (POC) 212 06/28/2017 08:41 PM Glucose (POC) 271 06/28/2017 04:14 PM    Glucose (POC) 179 06/28/2017 12:28 PM     No results for input(s): PH, PCO2, PO2, HCO3, FIO2 in the last 72 hours.   Recent Labs      06/28/17   0603   INR  1.0     No results found for: SDES  No results found for: CULT         Assessment:     Principal Problem:    ARF (acute renal failure) ([address] (6/28/2017)    Active Problems:    HTN (hypertension) (6/28/2017)      Hyperkalemia (6/28/2017)      DM type 2 (diabetes mellitus, type 2) ([address] (6/28/2017)      Anemia (6/28/2017)      Abnormal ultrasound of gallbladder (6/28/2017)      Obesity (BMI 30.0-34.9) (6/29/2017)           Plan:     Principal Problem:    ARF (acute renal failure) ([facility] (6/28/2017)   - suspect progression of CKD   - BUN and creatinine minimally improved overnight with hydration   - suspect she will need to go on dialysis but it is not urgent   - Renal following    Active Problems:    HTN (hypertension) (6/28/2017)   - BP okay on meds as above, follow      Hyperkalemia (6/28/2017)   - potassium down this AM   - follow      DM type 2 (diabetes mellitus, type 2) ([facility] (6/28/2017)   - with probable CKD   - BS low this AM, follow on SSI for now   - A1c not at goal, home regimen will be adjusted prior to discharge      Anemia (6/28/2017)   - Hgb down as expected with hydration   - anemia labs c/w anemia of chronic renal disease   - transfuse one unit today, patient consented by me      Abnormal ultrasound of gallbladder (6/28/2017)   - concern for cholecystitis noted   - lab work NOT consistent and clinically she does NOT have cholecystitis      Obesity (BMI 30.0-34.9) (6/29/2017)   - counseled on weight loss       Total time spent with patient: 35 minutes                  Care Plan discussed with: Patient, Care Manager and Nursing Staff    Discussed:  Code Status, Care Plan and D/C Planning    Prophylaxis:  Hep SQ    Disposition:  Home w/Family           ___________________________________________________    Attending Physician: Shahzad Lacey MD

## 2017-06-29 NOTE — PROGRESS NOTES
Patient complaining of right eye swelling and irritation. Says she was going to see an eye specialist 6/28, however came to hospital instead. Periorbital area visibly swollen with scant drainage and encrustation. Left periorbital area also notably swollen    Patient wants eye swelling assessed by a MD while here    Bedside and Verbal shift change report given to Rachele Elizondo RN (oncoming nurse) by Dennis Martinez RN (offgoing nurse). Report included the following information SBAR, Kardex, ED Summary, Procedure Summary, Accordion and Cardiac Rhythm NSR.

## 2017-06-29 NOTE — ROUTINE PROCESS
Primary Nurse Jie Chakraborty and Rabia Gonsales, RN performed a dual skin assessment on this patient No impairment noted  Dean score is 23

## 2017-06-29 NOTE — PROGRESS NOTES
Spiritual Care Assessment/Progress Notes    Hamlet Bowen [id number]  [id number]    [birth year]  62 y.o.  female    Patient Telephone Number: [phone] (home)   Evangelical Affiliation: [facility]   Language: English   Extended Emergency Contact Information  Primary Emergency Contact: Robby Trejo  Address: [address] [place] [address] [address], [facility] [address] Phone: [phone]  Mobile Phone: [phone]  Relation: Spouse   Patient Active Problem List    Diagnosis Date Noted    Obesity (BMI 30.0-34.9) 06/29/2017    [facility] 06/28/2017    HTN (hypertension) 06/28/2017    Hyperkalemia 06/28/2017    DM type 2 (diabetes mellitus, type 2) ([facility] 06/28/2017    Anemia 06/28/2017    Abnormal ultrasound of gallbladder 06/28/2017        Date: 6/29/2017       Level of Evangelical/Spiritual Activity:  [x]         Involved in ammy tradition/spiritual practice    []         Not involved in ammy tradition/spiritual practice  [x]         Spiritually oriented    []         Claims no spiritual orientation    []         seeking spiritual identity  []         Feels alienated from Gnosticism practice/tradition  []         Feels angry about Gnosticism practice/tradition  [x]         Spirituality/Gnosticism tradition  a resource for coping at this time.   []         Not able to assess due to medical condition    Services Provided Today:  []         crisis intervention    []         reading Scriptures  [x]         spiritual assessment    [x]         prayer  [x]         empathic listening/emotional support  []         rites and rituals (cite in comments)  [x]         life review     []         Gnosticism support  []         theological development   []         advocacy  []         ethical dialog     [x]         blessing  []         bereavement support    []         support to family  []         anticipatory grief support   []         help with AMD  []         spiritual guidance    [] meditation      Spiritual Care Needs  [x]         Emotional Support  [x]         Spiritual/Sikh Care  []         Loss/Adjustment  []         Advocacy/Referral                /Ethics  []         No needs expressed at               this time  []         Other: (note in               comments)  [address]  []         Follow up visits with               pt/family  []         Provide materials  []         Schedule sacraments  []         Contact Community               Clergy  [x]         Follow up as needed  []         Other: (note in               comments)     Comments:  is visiting per staff request. Spiritual support provided as pt processed her new diagnosis.  provided a listening presence and prayer with pt by bedside.      [id number] Duarte Agarwal M.Div, M.S, [address] available at [address] [place] [phone])

## 2017-06-29 NOTE — PROGRESS NOTES
1230: Patient with c/o swelling around right eye. Notified Dr. Dutch Valderrama. No new orders. 1530: Patient tearful about possible dialysis. Notified pastoral care. Will be up to see the patient. Bedside shift change report given to Lin COBOS (oncoming nurse) by [facility] RN (offgoing nurse). Report included the following information SBAR, Kardex, Intake/Output, Accordion, Recent Results and Cardiac Rhythm NSR.

## 2017-06-30 ENCOUNTER — APPOINTMENT (OUTPATIENT)
Dept: INTERVENTIONAL RADIOLOGY/VASCULAR | Age: 62
DRG: 683 | End: 2017-06-30
Attending: INTERNAL MEDICINE
Payer: COMMERCIAL

## 2017-06-30 PROBLEM — E87.5 HYPERKALEMIA: Status: RESOLVED | Noted: 2017-06-28 | Resolved: 2017-06-30

## 2017-06-30 PROBLEM — R93.2 ABNORMAL ULTRASOUND OF GALLBLADDER: Status: RESOLVED | Noted: 2017-06-28 | Resolved: 2017-06-30

## 2017-06-30 LAB
ALBUMIN SERPL BCP-MCNC: 2.3 G/DL (ref 3.5–5)
ANION GAP BLD CALC-SCNC: 11 MMOL/L (ref 5–15)
BUN SERPL-MCNC: 80 MG/DL (ref 6–20)
BUN/CREAT SERPL: 7 (ref 12–20)
CALCIUM SERPL-MCNC: 7.2 MG/DL (ref 8.5–10.1)
CHLORIDE SERPL-SCNC: 100 MMOL/L (ref 97–108)
CO2 SERPL-SCNC: 28 MMOL/L (ref 21–32)
CREAT SERPL-MCNC: 10.98 MG/DL (ref 0.55–1.02)
ERYTHROCYTE [DISTWIDTH] IN BLOOD BY AUTOMATED COUNT: 14.4 % (ref 11.5–14.5)
GLUCOSE BLD STRIP.AUTO-MCNC: 113 MG/DL (ref 65–100)
GLUCOSE BLD STRIP.AUTO-MCNC: 170 MG/DL (ref 65–100)
GLUCOSE BLD STRIP.AUTO-MCNC: 170 MG/DL (ref 65–100)
GLUCOSE BLD STRIP.AUTO-MCNC: 309 MG/DL (ref 65–100)
GLUCOSE SERPL-MCNC: 167 MG/DL (ref 65–100)
HAV IGM SERPL QL IA: NONREACTIVE
HBV CORE IGM SER QL: NONREACTIVE
HBV SURFACE AG SER QL: <0.1 INDEX
HBV SURFACE AG SER QL: NEGATIVE
HCT VFR BLD AUTO: 20.4 % (ref 35–47)
HCV AB SERPL QL IA: NONREACTIVE
HCV COMMENT,HCGAC: NORMAL
HGB BLD-MCNC: 6.5 G/DL (ref 11.5–16)
HGB BLD-MCNC: 6.7 G/DL (ref 11.5–16)
MAGNESIUM SERPL-MCNC: 2.2 MG/DL (ref 1.6–2.4)
MCH RBC QN AUTO: 27.2 PG (ref 26–34)
MCHC RBC AUTO-ENTMCNC: 31.9 G/DL (ref 30–36.5)
MCV RBC AUTO: 85.4 FL (ref 80–99)
PHOSPHATE SERPL-MCNC: 6.4 MG/DL (ref 2.6–4.7)
PLATELET # BLD AUTO: 313 K/UL (ref 150–400)
POTASSIUM SERPL-SCNC: 4.2 MMOL/L (ref 3.5–5.1)
RBC # BLD AUTO: 2.39 M/UL (ref 3.8–5.2)
SERVICE CMNT-IMP: ABNORMAL
SODIUM SERPL-SCNC: 139 MMOL/L (ref 136–145)
SP1: NORMAL
SP2: NORMAL
SP3: NORMAL
WBC # BLD AUTO: 6.7 K/UL (ref 3.6–11)

## 2017-06-30 PROCEDURE — 65660000000 HC RM CCU STEPDOWN

## 2017-06-30 PROCEDURE — 80069 RENAL FUNCTION PANEL: CPT | Performed by: INTERNAL MEDICINE

## 2017-06-30 PROCEDURE — 74011250637 HC RX REV CODE- 250/637: Performed by: INTERNAL MEDICINE

## 2017-06-30 PROCEDURE — 77030018719 HC DRSG PTCH ANTIMIC J&J -A

## 2017-06-30 PROCEDURE — 74011250636 HC RX REV CODE- 250/636: Performed by: RADIOLOGY

## 2017-06-30 PROCEDURE — 74011000258 HC RX REV CODE- 258: Performed by: INTERNAL MEDICINE

## 2017-06-30 PROCEDURE — 85018 HEMOGLOBIN: CPT | Performed by: INTERNAL MEDICINE

## 2017-06-30 PROCEDURE — 74011000250 HC RX REV CODE- 250: Performed by: RADIOLOGY

## 2017-06-30 PROCEDURE — 02H633Z INSERTION OF INFUSION DEVICE INTO RIGHT ATRIUM, PERCUTANEOUS APPROACH: ICD-10-PCS | Performed by: RADIOLOGY

## 2017-06-30 PROCEDURE — 74011636637 HC RX REV CODE- 636/637: Performed by: INTERNAL MEDICINE

## 2017-06-30 PROCEDURE — 74011000250 HC RX REV CODE- 250: Performed by: INTERNAL MEDICINE

## 2017-06-30 PROCEDURE — 77030002986 HC SUT PROL J&J -A

## 2017-06-30 PROCEDURE — 82962 GLUCOSE BLOOD TEST: CPT

## 2017-06-30 PROCEDURE — 74011250636 HC RX REV CODE- 250/636: Performed by: INTERNAL MEDICINE

## 2017-06-30 PROCEDURE — 80074 ACUTE HEPATITIS PANEL: CPT | Performed by: INTERNAL MEDICINE

## 2017-06-30 PROCEDURE — C1894 INTRO/SHEATH, NON-LASER: HCPCS

## 2017-06-30 PROCEDURE — 83735 ASSAY OF MAGNESIUM: CPT | Performed by: INTERNAL MEDICINE

## 2017-06-30 PROCEDURE — C1750 CATH, HEMODIALYSIS,LONG-TERM: HCPCS

## 2017-06-30 PROCEDURE — 85027 COMPLETE CBC AUTOMATED: CPT | Performed by: INTERNAL MEDICINE

## 2017-06-30 PROCEDURE — 76937 US GUIDE VASCULAR ACCESS: CPT

## 2017-06-30 PROCEDURE — 36415 COLL VENOUS BLD VENIPUNCTURE: CPT | Performed by: INTERNAL MEDICINE

## 2017-06-30 RX ORDER — HEPARIN SODIUM 1000 [USP'U]/ML
3600 INJECTION, SOLUTION INTRAVENOUS; SUBCUTANEOUS
Status: DISCONTINUED | OUTPATIENT
Start: 2017-06-30 | End: 2017-06-30 | Stop reason: HOSPADM

## 2017-06-30 RX ORDER — LIDOCAINE HYDROCHLORIDE 10 MG/ML
10-30 INJECTION INFILTRATION; PERINEURAL
Status: DISCONTINUED | OUTPATIENT
Start: 2017-06-30 | End: 2017-06-30 | Stop reason: HOSPADM

## 2017-06-30 RX ORDER — FENTANYL CITRATE 50 UG/ML
25-200 INJECTION, SOLUTION INTRAMUSCULAR; INTRAVENOUS
Status: DISCONTINUED | OUTPATIENT
Start: 2017-06-30 | End: 2017-06-30 | Stop reason: HOSPADM

## 2017-06-30 RX ORDER — DIPHENHYDRAMINE HYDROCHLORIDE 50 MG/ML
25-50 INJECTION, SOLUTION INTRAMUSCULAR; INTRAVENOUS ONCE
Status: COMPLETED | OUTPATIENT
Start: 2017-06-30 | End: 2017-06-30

## 2017-06-30 RX ORDER — HEPARIN SODIUM 1000 [USP'U]/ML
3000 INJECTION, SOLUTION INTRAVENOUS; SUBCUTANEOUS
Status: DISCONTINUED | OUTPATIENT
Start: 2017-06-30 | End: 2017-06-30

## 2017-06-30 RX ORDER — SODIUM CHLORIDE 9 MG/ML
250 INJECTION, SOLUTION INTRAVENOUS AS NEEDED
Status: DISCONTINUED | OUTPATIENT
Start: 2017-06-30 | End: 2017-07-03 | Stop reason: HOSPADM

## 2017-06-30 RX ORDER — VANCOMYCIN/0.9 % SOD CHLORIDE 1.5G/250ML
1500 PLASTIC BAG, INJECTION (ML) INTRAVENOUS ONCE
Status: COMPLETED | OUTPATIENT
Start: 2017-06-30 | End: 2017-06-30

## 2017-06-30 RX ORDER — DIPHENHYDRAMINE HYDROCHLORIDE 50 MG/ML
25-50 INJECTION, SOLUTION INTRAMUSCULAR; INTRAVENOUS ONCE
Status: DISCONTINUED | OUTPATIENT
Start: 2017-06-30 | End: 2017-06-30

## 2017-06-30 RX ORDER — HEPARIN SODIUM 200 [USP'U]/100ML
500 INJECTION, SOLUTION INTRAVENOUS ONCE
Status: COMPLETED | OUTPATIENT
Start: 2017-06-30 | End: 2017-06-30

## 2017-06-30 RX ADMIN — Medication 10 ML: at 23:48

## 2017-06-30 RX ADMIN — DIPHENHYDRAMINE HYDROCHLORIDE 25 MG: 50 INJECTION, SOLUTION INTRAMUSCULAR; INTRAVENOUS at 15:17

## 2017-06-30 RX ADMIN — ASPIRIN 81 MG: 81 TABLET, COATED ORAL at 08:58

## 2017-06-30 RX ADMIN — RENAGEL 1600 MG: 800 TABLET ORAL at 08:59

## 2017-06-30 RX ADMIN — RENAGEL 1600 MG: 800 TABLET ORAL at 18:35

## 2017-06-30 RX ADMIN — HEPARIN SODIUM 5000 UNITS: 5000 INJECTION, SOLUTION INTRAVENOUS; SUBCUTANEOUS at 23:49

## 2017-06-30 RX ADMIN — ERYTHROPOIETIN 6000 UNITS: 4000 INJECTION, SOLUTION INTRAVENOUS; SUBCUTANEOUS at 18:35

## 2017-06-30 RX ADMIN — LORATADINE 10 MG: 10 TABLET ORAL at 08:58

## 2017-06-30 RX ADMIN — INSULIN LISPRO 2 UNITS: 100 INJECTION, SOLUTION INTRAVENOUS; SUBCUTANEOUS at 23:47

## 2017-06-30 RX ADMIN — VANCOMYCIN HYDROCHLORIDE 1500 MG: 10 INJECTION, POWDER, LYOPHILIZED, FOR SOLUTION INTRAVENOUS at 15:05

## 2017-06-30 RX ADMIN — HEPARIN SODIUM 1000 UNITS: 200 INJECTION, SOLUTION INTRAVENOUS at 15:20

## 2017-06-30 RX ADMIN — HEPARIN SODIUM 5000 UNITS: 5000 INJECTION, SOLUTION INTRAVENOUS; SUBCUTANEOUS at 05:48

## 2017-06-30 RX ADMIN — NEPHROCAP 1 CAPSULE: 1 CAP ORAL at 12:53

## 2017-06-30 RX ADMIN — Medication 10 ML: at 04:19

## 2017-06-30 RX ADMIN — FENTANYL CITRATE 50 MCG: 50 INJECTION, SOLUTION INTRAMUSCULAR; INTRAVENOUS at 15:36

## 2017-06-30 RX ADMIN — DOXERCALCIFEROL 2.5 MCG: 2.5 CAPSULE ORAL at 17:03

## 2017-06-30 RX ADMIN — Medication 10 ML: at 17:05

## 2017-06-30 RX ADMIN — LIDOCAINE HYDROCHLORIDE 20 ML: 10 INJECTION, SOLUTION INFILTRATION; PERINEURAL at 15:34

## 2017-06-30 RX ADMIN — FENTANYL CITRATE 50 MCG: 50 INJECTION, SOLUTION INTRAMUSCULAR; INTRAVENOUS at 15:23

## 2017-06-30 RX ADMIN — HEPARIN SODIUM 3800 UNITS: 1000 INJECTION, SOLUTION INTRAVENOUS; SUBCUTANEOUS at 15:45

## 2017-06-30 RX ADMIN — SODIUM CHLORIDE: 450 INJECTION, SOLUTION INTRAVENOUS at 04:19

## 2017-06-30 RX ADMIN — INSULIN GLARGINE 10 UNITS: 100 INJECTION, SOLUTION SUBCUTANEOUS at 23:47

## 2017-06-30 RX ADMIN — RENAGEL 1600 MG: 800 TABLET ORAL at 12:53

## 2017-06-30 NOTE — PROGRESS NOTES
Bedside and Verbal shift change report given to Seth Barakat (oncoming nurse) by Viviana Castillo RN (offgoing nurse). Report included the following information SBAR, Kardex, Procedure Summary, Intake/Output, MAR, Accordion, Recent Results, Med Rec Status and Cardiac Rhythm . .     9:17 AM  Nephrology consulted and spoke with patient about placing line to start dialysis. 10:22 AM  Per Nephrology and Dr. Homar Witt, holding off and transfusing patient tomorrow with first dialysis treatment. 12:45 AM Spoke with nursing supervisor. IR kept calling me and wanting me to call doctor about order for dialysis catheter. I saw where he put in a consult order for IR. I told them several times I did not understand what they needed for the order and I had spoken with the doctor. The doctor gave me his direct cell phone number and wanted IR to call them. For some reason IR did not want to speak directly with the doctor and wanted me to call him, but I was not sure about what and after a couple of hours and 4 phone calls back and forth I was concerned about they delay in care for the patient. 2:40 PM  Patient going down for placement of permanent dialysis catheter.

## 2017-06-30 NOTE — ROUTINE PROCESS
Spoke with Kamlesh Toussaint RN, regarding to please keep patient NPO for tunneled dialysis catheter insertion scheduled around 3pm.

## 2017-06-30 NOTE — PROGRESS NOTES
Giorgio Kory [facility]  [address], [place], [address] [facility]  [phone]      Medical Progress Note      NAME: Libby Rhodes   :  89  MRM:  [id number]    Date/Time: 2017  8:19 AM          Subjective:     Chief Complaint:  Nausea: mild to moderate, improved from admission, constant, no emesis    ROS:  (bold if positive, if negative)                        Tolerating Diet          Objective:       Vitals:          Last 24hrs VS reviewed since prior progress note.  Most recent are:    Visit Vitals    /79 (BP 1 Location: Left arm, BP Patient Position: At rest)    Pulse 68    Temp 98.2 °F (36.8 °C)    Resp 17    Ht 5' 7\" (1.702 m)    Wt 90.7 kg (200 lb)    SpO2 99%    BMI 31.32 kg/m2     SpO2 Readings from Last 6 Encounters:   17 99%            Intake/Output Summary (Last 24 hours) at 17 0819  Last data filed at 17 0300   Gross per 24 hour   Intake          2598.33 ml   Output             1125 ml   Net          1473.33 ml          Exam:     Physical Exam:    Gen:  Well-developed, obese, in no acute distress  HEENT:  Pink conjunctivae, PERRL, hearing intact to voice, moist mucous membranes  Neck:  Supple, without masses, thyroid non-tender  Resp:  No accessory muscle use, clear breath sounds without wheezes rales or rhonchi  Card:  No murmurs, normal S1, S2 without thrills, bruits or peripheral edema, 2+ pedal pulses  Abd:  Soft, non-tender, non-distended, normoactive bowel sounds are present, no palpable organomegaly and no detectable hernias  Lymph:  No cervical or inguinal adenopathy  Musc:  No cyanosis or clubbing  Skin:  No rashes or ulcers, skin turgor is good, cap refill <2 sec  Neuro:  Cranial nerves are grossly intact, no focal motor weakness, follows commands appropriately  Psych:  Good insight, oriented to person, place and time, alert       Telemetry reviewed:   normal sinus rhythm    Medications Reviewed: (see below)    Lab Data Reviewed: (see below)    ______________________________________________________________________    Medications:     Current Facility-Administered Medications   Medication Dose Route Frequency    0.9% sodium chloride infusion 250 mL  250 mL IntraVENous PRN    sevelamer (RENAGEL) tablet 1,600 mg  1,600 mg Oral TID WITH MEALS    doxercalciferol (HECTOROL) capsule 2.5 mcg  2.5 mcg Oral DAILY    epoetin oma (EPOGEN;PROCRIT) injection 6,000 Units  6,000 Units SubCUTAneous Q MON, WED & FRI    sodium chloride (NS) flush 5-10 mL  5-10 mL IntraVENous Q8H    sodium chloride (NS) flush 5-10 mL  5-10 mL IntraVENous PRN    acetaminophen (TYLENOL) tablet 650 mg  650 mg Oral Q4H PRN    oxyCODONE-acetaminophen (PERCOCET) 5-325 mg per tablet 1 Tab  1 Tab Oral Q4H PRN    HYDROmorphone (PF) (DILAUDID) injection 0.5 mg  0.5 mg IntraVENous Q4H PRN    prochlorperazine (COMPAZINE) injection 10 mg  10 mg IntraVENous Q6H PRN    zolpidem (AMBIEN) tablet 5 mg  5 mg Oral QHS PRN    insulin lispro (HUMALOG) injection   SubCUTAneous AC&HS    glucose chewable tablet 16 g  4 Tab Oral PRN    dextrose (D50W) injection syrg 12.5-25 g  12.5-25 g IntraVENous PRN    glucagon (GLUCAGEN) injection 1 mg  1 mg IntraMUSCular PRN    heparin (porcine) injection 5,000 Units  5,000 Units SubCUTAneous Q8H    insulin glargine (LANTUS) injection 10 Units  10 Units SubCUTAneous QHS    0.45% sodium chloride 1,000 mL with sodium bicarbonate (8.4%) 100 mEq infusion   IntraVENous CONTINUOUS    aspirin delayed-release tablet 81 mg  81 mg Oral DAILY    loratadine (CLARITIN) tablet 10 mg  10 mg Oral EVERY OTHER DAY            Lab Review:     Recent Labs      06/30/17   0430  06/30/17   0117  06/29/17   0848  06/29/17   0409   WBC   --   6.7  7.2  6.4   HGB  6.7*  6.5*  7.4*  6.7*   HCT   --   20.4*  24.0*  20.8*   PLT   --   313  327  281     Recent Labs      06/30/17   0117  06/29/17   0409  06/28/17   0603   NA  139  140  131*   K  4.2  4.7  7.4*   CL 100  101  98   CO2  28  27  19*   GLU  167*  116*  251*   BUN  80*  84*  89*   CREA  10.98*  10.94*  11.18*   CA  7.2*  7.7*  7.6*  8.8   MG  2.2  2.3  2.7*   PHOS  6.4*  7.3*  6.4*   ALB  2.3*  2.3*  3.1*   TBILI   --   0.3  0.5   SGOT   --   10*  12*   ALT   --   13  15   INR   --    --   1.0     Lab Results   Component Value Date/Time    Glucose (POC) 113 06/30/2017 07:32 AM    Glucose (POC) 229 06/29/2017 09:30 PM    Glucose (POC) 198 06/29/2017 04:59 PM    Glucose (POC) 187 06/29/2017 11:33 AM    Glucose (POC) 101 06/29/2017 08:16 AM     No results for input(s): PH, PCO2, PO2, HCO3, FIO2 in the last 72 hours.   Recent Labs      06/28/17   0603   INR  1.0     No results found for: SDES  Lab Results   Component Value Date/Time    Culture result: GRAM NEGATIVE RODS  (10,000 COLONIES/mL)   06/28/2017 09:22 AM    Culture result: MIXED UROGENITAL PATRIC ISOLATED 06/28/2017 09:22 AM            Assessment:     Principal Problem:    ARF (acute renal failure) ([facility] (6/28/2017)    Active Problems:    HTN (hypertension) (6/28/2017)      Hyperkalemia (6/28/2017)      DM type 2 (diabetes mellitus, type 2) ([facility] (6/28/2017)      Anemia (6/28/2017)      Abnormal ultrasound of gallbladder (6/28/2017)      Obesity (BMI 30.0-34.9) (6/29/2017)           Plan:     Principal Problem:    ARF (acute renal failure) ([facility] (6/28/2017)   - suspect progression of CKD, now with CKD 5   - BUN and creatinine really haven't improved with hydration   - needs to start dialysis prior to discharge or she will quickly decompensate   - Renal following    Active Problems:    HTN (hypertension) (6/28/2017)   - BP okay on meds as above, follow      Hyperkalemia (6/28/2017)   - resolved      DM type 2 (diabetes mellitus, type 2) ([facility] (6/28/2017)   - with probable CKD   - BS low this AM, follow on SSI for now   - A1c not at goal, home regimen will be adjusted prior to discharge      Anemia (6/28/2017)   - Hgb down as expected with hydration   - anemia labs c/w anemia of chronic renal disease   - transfuse one unit today (not done yesterday as repeat Hgb was >7), patient consented by me      Obesity (BMI 30.0-34.9) (6/29/2017)   - counseled on weight loss       Total time spent with patient: 35 minutes                  Care Plan discussed with: Patient, Care Manager and Nursing Staff    Discussed:  Code Status, Care Plan and D/C Planning    Prophylaxis:  Hep SQ    Disposition:  Home w/Family           ___________________________________________________    Attending Physician: Carmen Marrero MD

## 2017-06-30 NOTE — PROGRESS NOTES
2014 Pt called out because her IV is beeping. Pt in bed on the phone. IV was flushed. IV working, restarted the IV. Arm was positioned on a pillow and explained to try to not bend the arm. 2145 Pt called out for IV beeping. Pt said sorry forgot to not bend the arm. IV restarted. 0124 Pt called needed help with bathroom. RN assisted pt.    0550 Pt H & H was redrawn. Lab received it.    0528 Pt HGB 6.7. Will notify MD. Yvonne Shaw with HGB result. No further orders. Bedside and Verbal shift change report given to [address] (oncoming nurse) by Rakesh Granado RN (offgoing nurse). Report included the following information SBAR, Kardex, MAR and Cardiac Rhythm normal sinus rhythm.

## 2017-06-30 NOTE — ROUTINE PROCESS
Received IR consult for IR tunnled dialysis catheter. Hernando Navas RN to get order for tunneled dialysis catheter. James Federico states \"not comfortable with getting order for dialysis catheter\". Call out to Dr. Akua Enriquez to clarify dialysis catheter insertion. Spoke with Dr. Akua Enriquez received verbal order from Dr. Akua Enriquez for tunneled dialysis catheter.

## 2017-06-30 NOTE — PROGRESS NOTES
Bedside and Verbal shift change report given to Lin RN (oncoming nurse) by [facility] RN (offgoing nurse). Report included the following information SBAR, Kardex, Procedure Summary, Intake/Output, MAR, Accordion, Recent Results, Med Rec Status and Cardiac Rhythm . Spotzots

## 2017-06-30 NOTE — PROGRESS NOTES
Renal Progress Note    NAME:  Libby Rhodes   :      MRN:   [id number]     Date/Time:  2017 9:51 AM      Assessment:   1. ESRD --> early uraemia  2. HTN  3. Anaemia in CKD  4. HTN  5. Hyperphosphatemia       Plan:   1. The azotemia has not improved with hydration. Additionally, there is a suggestion of dysgeusia. He clinical picture seems to fit early uraemia. With this in mind, she should start         Dialysis therapy. She could have her first treatment today or tomorrow. 2.   The dialysis disequilibrium syndrome was discussed with the patient as well. She understood. She has agreed to proceed with the placement of the dialysis catheter and dialysis treatment. 3.    Will discontinue the IV fluids. 4.    Continue the phos binder. 5.    Start Nephrocaps ---> one daily. 6.    Ok to transfuse PRBCs (with HD). Subjective:           Ms. Obey Bhatti felt better today. However, she reported dysgeusia. There was no suggestion of dysgeusia or chest pain.       Review of Systems:  Y  N       Y  N  []         []          Fever/chills                                               []         []          Chest Pain  []         []          Cough                                                       []         []          Diarrhea   []         []          Sputum                                                     []         []          Constipation  []         []          SOB/MENDOZA                                                []         []          Nausea/Vomit  []         []          Abd Pain                                                    []         []          Tolerating PT  []         []          Dysuria                                                      []         []          Tolerating Diet     []        Unable to obtain  ROS due to  []        mental status change  []        sedated   []        intubated    Medications reviewed:  Current Facility-Administered Medications Medication Dose Route Frequency    0.9% sodium chloride infusion 250 mL  250 mL IntraVENous PRN    B complex-vitaminC-folic acid (NEPHROCAP) cap  1 Cap Oral DAILY    sevelamer (RENAGEL) tablet 1,600 mg  1,600 mg Oral TID WITH MEALS    doxercalciferol (HECTOROL) capsule 2.5 mcg  2.5 mcg Oral DAILY    epoetin oma (EPOGEN;PROCRIT) injection 6,000 Units  6,000 Units SubCUTAneous Q MON, WED & FRI    sodium chloride (NS) flush 5-10 mL  5-10 mL IntraVENous Q8H    sodium chloride (NS) flush 5-10 mL  5-10 mL IntraVENous PRN    acetaminophen (TYLENOL) tablet 650 mg  650 mg Oral Q4H PRN    oxyCODONE-acetaminophen (PERCOCET) 5-325 mg per tablet 1 Tab  1 Tab Oral Q4H PRN    HYDROmorphone (PF) (DILAUDID) injection 0.5 mg  0.5 mg IntraVENous Q4H PRN    prochlorperazine (COMPAZINE) injection 10 mg  10 mg IntraVENous Q6H PRN    zolpidem (AMBIEN) tablet 5 mg  5 mg Oral QHS PRN    insulin lispro (HUMALOG) injection   SubCUTAneous AC&HS    glucose chewable tablet 16 g  4 Tab Oral PRN    dextrose (D50W) injection syrg 12.5-25 g  12.5-25 g IntraVENous PRN    glucagon (GLUCAGEN) injection 1 mg  1 mg IntraMUSCular PRN    heparin (porcine) injection 5,000 Units  5,000 Units SubCUTAneous Q8H    insulin glargine (LANTUS) injection 10 Units  10 Units SubCUTAneous QHS    aspirin delayed-release tablet 81 mg  81 mg Oral DAILY    loratadine (CLARITIN) tablet 10 mg  10 mg Oral EVERY OTHER DAY        Objective:   Vitals:  Visit Vitals    /78 (BP 1 Location: Left arm, BP Patient Position: Sitting)    Pulse 70    Temp 98.1 °F (36.7 °C)    Resp 18    Ht 5' 7\" (1.702 m)    Wt 90.7 kg (200 lb)    SpO2 99%    BMI 31.32 kg/m2     Temp (24hrs), Av.3 °F (36.8 °C), Min:96.9 °F (36.1 °C), Max:99.3 °F (37.4 °C)      O2 Device: Room air    Last 24hr Input/Output:    Intake/Output Summary (Last 24 hours) at 17 0951  Last data filed at 17 0300   Gross per 24 hour   Intake          2478.33 ml   Output 1125 ml   Net          1353.33 ml            PHYSICAL EXAM:        General:    Resting quietly in bed. Her Nurse was in attendance. Head:   Normocephalic . Eyes:   No icterus. Neck:  No obvious neck masses. Lungs:   Clear to auscultation , No Wheezing , Rhonchi or rales. Heart:   No S3 gallop , No pericardial rub    Abdomen:   Not distended. Extremities: No leg oedema. Psych:  Not anxious or agitated.       Neurologic: Alert and oriented         []        Telemetry Reviewed     []        NSR []        PAC/PVCs   []        Afib  []        Paced   []        NSVT   []        Woodruff []        NGT  []        Intubated on vent    Lab Data Reviewed:    Recent Results (from the past 24 hour(s))   GLUCOSE, POC    Collection Time: 06/29/17 11:33 AM   Result Value Ref Range    Glucose (POC) 187 (H) 65 - 100 mg/dL    Performed by Miguel Pena (PCT)    GLUCOSE, POC    Collection Time: 06/29/17  4:59 PM   Result Value Ref Range    Glucose (POC) 198 (H) 65 - 100 mg/dL    Performed by Paty Monroy (PCT)    GLUCOSE, POC    Collection Time: 06/29/17  9:30 PM   Result Value Ref Range    Glucose (POC) 229 (H) 65 - 100 mg/dL    Performed by Christian Tejeda    RENAL FUNCTION PANEL    Collection Time: 06/30/17  1:17 AM   Result Value Ref Range    Sodium 139 136 - 145 mmol/L    Potassium 4.2 3.5 - 5.1 mmol/L    Chloride 100 97 - 108 mmol/L    CO2 28 21 - 32 mmol/L    Anion gap 11 5 - 15 mmol/L    Glucose 167 (H) 65 - 100 mg/dL    BUN 80 (H) 6 - 20 MG/DL    Creatinine 10.98 (H) 0.55 - 1.02 MG/DL    BUN/Creatinine ratio 7 (L) 12 - 20      GFR est AA 4 (L) >60 ml/min/1.73m2    GFR est non-AA 4 (L) >60 ml/min/1.73m2    Calcium 7.2 (L) 8.5 - 10.1 MG/DL    Phosphorus 6.4 (H) 2.6 - 4.7 MG/DL    Albumin 2.3 (L) 3.5 - 5.0 g/dL   CBC W/O DIFF    Collection Time: 06/30/17  1:17 AM   Result Value Ref Range    WBC 6.7 3.6 - 11.0 K/uL    RBC 2.39 (L) 3.80 - 5.20 M/uL    HGB 6.5 (L) 11.5 - 16.0 g/dL    HCT 20.4 (L) 35.0 - 47.0 %    MCV 85.4 80.0 - 99.0 FL    MCH 27.2 26.0 - 34.0 PG    MCHC 31.9 30.0 - 36.5 g/dL    RDW 14.4 11.5 - 14.5 %    PLATELET 633 409 - 950 K/uL   MAGNESIUM    Collection Time: 06/30/17  1:17 AM   Result Value Ref Range    Magnesium 2.2 1.6 - 2.4 mg/dL   HEMOGLOBIN    Collection Time: 06/30/17  4:30 AM   Result Value Ref Range    HGB 6.7 (L) 11.5 - 16.0 g/dL   GLUCOSE, POC    Collection Time: 06/30/17  7:32 AM   Result Value Ref Range    Glucose (POC) 113 (H) 65 - 100 mg/dL    Performed by Stephanie Cm (PCT)        Total time spent with patient:  []        15   []        25   []        35   []         __ minutes    []        Critical Care Provided    Care Plan discussed with:    Floor Nurse  Daughter (via telephone)  [facility]        [x]        Patient   []        Family    []        Care Manager   []        Consultant/Specialist :      []          >50% of visit spent in counseling and coordination of care   (Discussed []        CODE status,  []        Care Plan, []        D/C Planning)    ___________________________________________________    Attending Physician: Gregory Merino MD

## 2017-07-01 LAB
ANION GAP BLD CALC-SCNC: 9 MMOL/L (ref 5–15)
BACTERIA SPEC CULT: ABNORMAL
BACTERIA SPEC CULT: ABNORMAL
BUN SERPL-MCNC: 76 MG/DL (ref 6–20)
BUN/CREAT SERPL: 7 (ref 12–20)
CALCIUM SERPL-MCNC: 7.3 MG/DL (ref 8.5–10.1)
CC UR VC: ABNORMAL
CHLORIDE SERPL-SCNC: 101 MMOL/L (ref 97–108)
CO2 SERPL-SCNC: 28 MMOL/L (ref 21–32)
CREAT SERPL-MCNC: 11.39 MG/DL (ref 0.55–1.02)
ERYTHROCYTE [DISTWIDTH] IN BLOOD BY AUTOMATED COUNT: 14.7 % (ref 11.5–14.5)
GLUCOSE BLD STRIP.AUTO-MCNC: 164 MG/DL (ref 65–100)
GLUCOSE BLD STRIP.AUTO-MCNC: 209 MG/DL (ref 65–100)
GLUCOSE BLD STRIP.AUTO-MCNC: 95 MG/DL (ref 65–100)
GLUCOSE BLD STRIP.AUTO-MCNC: 98 MG/DL (ref 65–100)
GLUCOSE SERPL-MCNC: 159 MG/DL (ref 65–100)
HCT VFR BLD AUTO: 20.9 % (ref 35–47)
HGB BLD-MCNC: 6.8 G/DL (ref 11.5–16)
MAGNESIUM SERPL-MCNC: 2.3 MG/DL (ref 1.6–2.4)
MCH RBC QN AUTO: 28.5 PG (ref 26–34)
MCHC RBC AUTO-ENTMCNC: 32.5 G/DL (ref 30–36.5)
MCV RBC AUTO: 87.4 FL (ref 80–99)
PHOSPHATE SERPL-MCNC: 5.5 MG/DL (ref 2.6–4.7)
PLATELET # BLD AUTO: 320 K/UL (ref 150–400)
POTASSIUM SERPL-SCNC: 4.3 MMOL/L (ref 3.5–5.1)
RBC # BLD AUTO: 2.39 M/UL (ref 3.8–5.2)
SERVICE CMNT-IMP: ABNORMAL
SERVICE CMNT-IMP: NORMAL
SERVICE CMNT-IMP: NORMAL
SODIUM SERPL-SCNC: 138 MMOL/L (ref 136–145)
WBC # BLD AUTO: 6.7 K/UL (ref 3.6–11)

## 2017-07-01 PROCEDURE — 84100 ASSAY OF PHOSPHORUS: CPT | Performed by: INTERNAL MEDICINE

## 2017-07-01 PROCEDURE — 74011250636 HC RX REV CODE- 250/636: Performed by: INTERNAL MEDICINE

## 2017-07-01 PROCEDURE — 5A1D60Z PERFORMANCE OF URINARY FILTRATION, MULTIPLE: ICD-10-PCS | Performed by: INTERNAL MEDICINE

## 2017-07-01 PROCEDURE — 74011250637 HC RX REV CODE- 250/637: Performed by: INTERNAL MEDICINE

## 2017-07-01 PROCEDURE — 83735 ASSAY OF MAGNESIUM: CPT | Performed by: INTERNAL MEDICINE

## 2017-07-01 PROCEDURE — 77030029131 HC ADMN ST IV BLD N DEHP ICUM -B

## 2017-07-01 PROCEDURE — P9016 RBC LEUKOCYTES REDUCED: HCPCS | Performed by: INTERNAL MEDICINE

## 2017-07-01 PROCEDURE — 82962 GLUCOSE BLOOD TEST: CPT

## 2017-07-01 PROCEDURE — 80048 BASIC METABOLIC PNL TOTAL CA: CPT | Performed by: INTERNAL MEDICINE

## 2017-07-01 PROCEDURE — 36430 TRANSFUSION BLD/BLD COMPNT: CPT

## 2017-07-01 PROCEDURE — 65270000029 HC RM PRIVATE

## 2017-07-01 PROCEDURE — 85027 COMPLETE CBC AUTOMATED: CPT | Performed by: INTERNAL MEDICINE

## 2017-07-01 PROCEDURE — 90935 HEMODIALYSIS ONE EVALUATION: CPT

## 2017-07-01 PROCEDURE — 30243N1 TRANSFUSION OF NONAUTOLOGOUS RED BLOOD CELLS INTO CENTRAL VEIN, PERCUTANEOUS APPROACH: ICD-10-PCS | Performed by: INTERNAL MEDICINE

## 2017-07-01 PROCEDURE — 36415 COLL VENOUS BLD VENIPUNCTURE: CPT | Performed by: INTERNAL MEDICINE

## 2017-07-01 PROCEDURE — 74011636637 HC RX REV CODE- 636/637: Performed by: INTERNAL MEDICINE

## 2017-07-01 RX ORDER — HYDRALAZINE HYDROCHLORIDE 20 MG/ML
20 INJECTION INTRAMUSCULAR; INTRAVENOUS ONCE
Status: COMPLETED | OUTPATIENT
Start: 2017-07-01 | End: 2017-07-01

## 2017-07-01 RX ADMIN — NEPHROCAP 1 CAPSULE: 1 CAP ORAL at 08:58

## 2017-07-01 RX ADMIN — HEPARIN SODIUM 5000 UNITS: 5000 INJECTION, SOLUTION INTRAVENOUS; SUBCUTANEOUS at 21:56

## 2017-07-01 RX ADMIN — HYDRALAZINE HYDROCHLORIDE 20 MG: 20 INJECTION INTRAMUSCULAR; INTRAVENOUS at 18:05

## 2017-07-01 RX ADMIN — ACETAMINOPHEN 650 MG: 325 TABLET ORAL at 20:05

## 2017-07-01 RX ADMIN — INSULIN LISPRO 2 UNITS: 100 INJECTION, SOLUTION INTRAVENOUS; SUBCUTANEOUS at 11:58

## 2017-07-01 RX ADMIN — ASPIRIN 81 MG: 81 TABLET, COATED ORAL at 08:58

## 2017-07-01 RX ADMIN — HEPARIN SODIUM 5000 UNITS: 5000 INJECTION, SOLUTION INTRAVENOUS; SUBCUTANEOUS at 18:07

## 2017-07-01 RX ADMIN — Medication 10 ML: at 22:31

## 2017-07-01 RX ADMIN — Medication 10 ML: at 06:00

## 2017-07-01 RX ADMIN — RENAGEL 1600 MG: 800 TABLET ORAL at 08:58

## 2017-07-01 RX ADMIN — DOXERCALCIFEROL 2.5 MCG: 2.5 CAPSULE ORAL at 09:03

## 2017-07-01 RX ADMIN — HEPARIN SODIUM 5000 UNITS: 5000 INJECTION, SOLUTION INTRAVENOUS; SUBCUTANEOUS at 06:18

## 2017-07-01 RX ADMIN — INSULIN GLARGINE 10 UNITS: 100 INJECTION, SOLUTION SUBCUTANEOUS at 22:04

## 2017-07-01 NOTE — PROGRESS NOTES
The patient Eden Chavez was seen and examined. Awaiting dialysis. She has many questions revolving around her ESRD and management. She is still stunned by all the events and wonders what will be next. Answered many questions but she will need further education as time goes on. Feeling about the same overall. No new complaints. Visit Vitals    BP (!) 156/94 (BP 1 Location: Left arm, BP Patient Position: Sitting)    Pulse 75    Temp 97.8 °F (36.6 °C)    Resp 18    Ht 5' 7\" (1.702 m)    Wt 92.5 kg (204 lb)    SpO2 100%    BMI 31.95 kg/m2     No JVD  Lungs clear  No gallops  Abdomen benign    Recent Labs      07/01/17   0251  06/30/17   0117  06/29/17   0409   NA  138  139  140   K  4.3  4.2  4.7   CL  101  100  101   CO2  28  28  27   BUN  76*  80*  84*   CREA  11.39*  10.98*  10.94*   CA  7.3*  7.2*  7.7*  7.6*   ALB   --   2.3*  2.3*   PHOS  5.5*  6.4*  7.3*   MG  2.3  2.2  2.3     Recent Labs      07/01/17   0251  06/30/17   0430  06/30/17   0117  06/29/17   0848   WBC  6.7   --   6.7  7.2   HGB  6.8*  6.7*  6.5*  7.4*   HCT  20.9*   --   20.4*  24.0*   PLT  320   --   313  327     No results for input(s): ANJEL, KU, CLU, CREAU in the last 72 hours. No lab exists for component: PROU    IMPRESSION:  ESRD - first dialysis today, dialysis again tomorrow    Continue current medical management.     Isela Vazquez MD  7/1/2017

## 2017-07-01 NOTE — PROGRESS NOTES
Contacted Dr. Alexander Rodriguez to notify patient's increasing blood pressures throughout afternoon (highest) 195/87. Received order IV hydralazine 20mg 1x dose.

## 2017-07-01 NOTE — PROGRESS NOTES
2054 Pt upset because her kidneys and new HD.    0012 Pt resting. 0545 Pt returned to bed after urinating. Bedside and Verbal shift change report given to Lorena Patino RN (oncoming nurse) by Jonathan Law RN (offgoing nurse). Report included the following information SBAR, Kardex, MAR and Cardiac Rhythm normal sinus.

## 2017-07-01 NOTE — DIALYSIS
[facility]                         [phone]  Vitals Pre Post Assessment Pre Post   /90 195/87 LOC A/O x4 A/O x4   HR 71 69 Lungs clear clear   Temp 98.7 98.5 Cardiac Reg. rate Reg. rate   Resp 16 16 Skin Intact, warm. Intact, warm   Weight 90.3kg 88.9kg Edema none none      Pain 0/10 denies 0/10 denies     Orders   Duration: Start: 1310 End: 1553 Total: 2.5hrs   Dialyzer: revaclear   K Bath: 3   Ca Bath: 2.5   Na / Bicarb: 140/30   Target Fluid Removal: 1500ml     Access   Type & Location: Right subclavian   Comments:                            + aspiration and flush     Labs   Hep B status / date: Neg. 6/30/17   Obtained/Reviewed  Critical Results Called noted       Meds Given   Name Dose Route                    Total Liters Process: 35.9   Net Fluid Removed: 1500ml      Comments   Time Out Done: yes   Primary Nurse Rpt Pre: Clara Cid RN   Primary Nurse Rpt PostMeldon Dearth RN   Pt Education: yes   Care Plan: HD today, 1st time. Comfort. Tx Summary: Pt tolerated HD tx well, A/O x4, BP high, asymptomatic. All possible blood returned. BT done without reaction. Endorsed to RN without complications.

## 2017-07-01 NOTE — PROGRESS NOTES
[address], [address] [place] [address]  [phone]      Medical Progress Note      NAME: Rere Mckeon   :    MRM:  [id number]    Date/Time: 2017  8:29 AM           Subjective:     Chief Complaint:  Nausea: mild to moderate, improved from admission, constant, no emesis    ROS:  (bold if positive, if negative)                        Tolerating Diet          Objective:       Vitals:          Last 24hrs VS reviewed since prior progress note.  Most recent are:    Visit Vitals    BP (!) 156/94 (BP 1 Location: Left arm, BP Patient Position: Sitting)    Pulse 75    Temp 97.8 °F (36.6 °C)    Resp 18    Ht 5' 7\" (1.702 m)    Wt 92.5 kg (204 lb)    SpO2 100%    BMI 31.95 kg/m2     SpO2 Readings from Last 6 Encounters:   17 100%            Intake/Output Summary (Last 24 hours) at 17 0829  Last data filed at 17 2526   Gross per 24 hour   Intake              650 ml   Output             1100 ml   Net             -450 ml          Exam:     Physical Exam:    Gen:  Well-developed, obese, in no acute distress  HEENT:  Pink conjunctivae, PERRL, hearing intact to voice, moist mucous membranes  Neck:  Supple, without masses, thyroid non-tender  Resp:  No accessory muscle use, clear breath sounds without wheezes rales or rhonchi  Card:  No murmurs, normal S1, S2 without thrills, bruits or peripheral edema, 2+ pedal pulses  Abd:  Soft, non-tender, non-distended, normoactive bowel sounds are present, no palpable organomegaly and no detectable hernias  Lymph:  No cervical or inguinal adenopathy  Musc:  No cyanosis or clubbing  Skin:  No rashes or ulcers, skin turgor is good, cap refill <2 sec  Neuro:  Cranial nerves are grossly intact, no focal motor weakness, follows commands appropriately  Psych:  Good insight, oriented to person, place and time, alert       Telemetry reviewed:   normal sinus rhythm    Medications Reviewed: (see below)    Lab Data Reviewed: (see below)    ______________________________________________________________________    Medications:     Current Facility-Administered Medications   Medication Dose Route Frequency    0.9% sodium chloride infusion 250 mL  250 mL IntraVENous PRN    B complex-vitaminC-folic acid (NEPHROCAP) cap  1 Cap Oral DAILY    sevelamer (RENAGEL) tablet 1,600 mg  1,600 mg Oral TID WITH MEALS    doxercalciferol (HECTOROL) capsule 2.5 mcg  2.5 mcg Oral DAILY    epoetin oma (EPOGEN;PROCRIT) injection 6,000 Units  6,000 Units SubCUTAneous Q MON, WED & FRI    sodium chloride (NS) flush 5-10 mL  5-10 mL IntraVENous Q8H    sodium chloride (NS) flush 5-10 mL  5-10 mL IntraVENous PRN    acetaminophen (TYLENOL) tablet 650 mg  650 mg Oral Q4H PRN    oxyCODONE-acetaminophen (PERCOCET) 5-325 mg per tablet 1 Tab  1 Tab Oral Q4H PRN    HYDROmorphone (PF) (DILAUDID) injection 0.5 mg  0.5 mg IntraVENous Q4H PRN    prochlorperazine (COMPAZINE) injection 10 mg  10 mg IntraVENous Q6H PRN    zolpidem (AMBIEN) tablet 5 mg  5 mg Oral QHS PRN    insulin lispro (HUMALOG) injection   SubCUTAneous AC&HS    glucose chewable tablet 16 g  4 Tab Oral PRN    dextrose (D50W) injection syrg 12.5-25 g  12.5-25 g IntraVENous PRN    glucagon (GLUCAGEN) injection 1 mg  1 mg IntraMUSCular PRN    heparin (porcine) injection 5,000 Units  5,000 Units SubCUTAneous Q8H    insulin glargine (LANTUS) injection 10 Units  10 Units SubCUTAneous QHS    aspirin delayed-release tablet 81 mg  81 mg Oral DAILY    loratadine (CLARITIN) tablet 10 mg  10 mg Oral EVERY OTHER DAY            Lab Review:     Recent Labs      07/01/17   0251  06/30/17   0430  06/30/17   0117  06/29/17   0848   WBC  6.7   --   6.7  7.2   HGB  6.8*  6.7*  6.5*  7.4*   HCT  20.9*   --   20.4*  24.0*   PLT  320   --   313  327     Recent Labs      07/01/17   0251  06/30/17   0117  06/29/17   0409   NA  138  139  140   K  4.3  4.2  4.7   CL  101  100  101   CO2  28  28  27 GLU  159*  167*  116*   BUN  76*  80*  84*   CREA  11.39*  10.98*  10.94*   CA  7.3*  7.2*  7.7*  7.6*   MG  2.3  2.2  2.3   PHOS  5.5*  6.4*  7.3*   ALB   --   2.3*  2.3*   TBILI   --    --   0.3   SGOT   --    --   10*   ALT   --    --   13     Lab Results   Component Value Date/Time    Glucose (POC) 95 07/01/2017 07:12 AM    Glucose (POC) 309 06/30/2017 11:09 PM    Glucose (POC) 170 06/30/2017 04:30 PM    Glucose (POC) 170 06/30/2017 11:16 AM    Glucose (POC) 113 06/30/2017 07:32 AM     No results for input(s): PH, PCO2, PO2, HCO3, FIO2 in the last 72 hours. No results for input(s): INR in the last 72 hours. No lab exists for component: INREXT, INREXT  No results found for: SDES  Lab Results   Component Value Date/Time    Culture result: GRAM NEGATIVE RODS  (10,000 COLONIES/mL)   06/28/2017 09:22 AM    Culture result: MIXED UROGENITAL PATRIC ISOLATED 06/28/2017 09:22 AM            Assessment:     Principal Problem:    ARF (acute renal failure) ([facility] (6/28/2017)    Active Problems:    HTN (hypertension) (6/28/2017)      DM type 2 (diabetes mellitus, type 2) ([facility] (6/28/2017)      Anemia (6/28/2017)      Obesity (BMI 30.0-34.9) (6/29/2017)           Plan:     Principal Problem:    ARF (acute renal failure) ([facility] (6/28/2017)   - suspect progression of CKD, now with CKD 5   - BUN and creatinine really haven't improved with hydration   - has agreed to start dialysis, line placed yesterday   - Renal following    Active Problems:    HTN (hypertension) (6/28/2017)   - BP okay on meds as above, follow      Hyperkalemia (6/28/2017)   - resolved      DM type 2 (diabetes mellitus, type 2) ([facility] (6/28/2017)   - with probable CKD   - BS low this AM, follow on SSI for now   - A1c not at goal, home regimen will be adjusted prior to discharge      Anemia (6/28/2017)   - Hgb down as expected with hydration   - anemia labs c/w anemia of chronic renal disease   - transfuse with dialysis today      Obesity (BMI 30.0-34. 9) (6/29/2017)   - counseled on weight loss       Total time spent with patient: 25 minutes                  Care Plan discussed with: Patient, Care Manager and Nursing Staff    Discussed:  Code Status, Care Plan and D/C Planning    Prophylaxis:  Hep SQ    Disposition:  Home w/Family           ___________________________________________________    Attending Physician: Yovani Tejeda MD

## 2017-07-01 NOTE — PROGRESS NOTES
TRANSFER - OUT REPORT:    Verbal report given to Velma Chaparro RN(name) on Estle Rom  being transferred to 4th floor(unit) for routine progression of care       Report consisted of patients Situation, Background, Assessment and   Recommendations(SBAR). Information from the following report(s) SBAR, Kardex, Intake/Output, MAR, Recent Results and Cardiac Rhythm NSR was reviewed with the receiving nurse. Lines:   Peripheral IV 06/30/17 Right Hand (Active)   Site Assessment Clean, dry, & intact 7/1/2017 11:59 AM   Phlebitis Assessment 0 7/1/2017 11:59 AM   Infiltration Assessment 0 7/1/2017 11:59 AM   Dressing Status Clean, dry, & intact 7/1/2017 11:59 AM   Dressing Type Transparent 7/1/2017 11:59 AM   Hub Color/Line Status Pink;Capped 7/1/2017 11:59 AM   Action Taken Open ports on tubing capped 7/1/2017 12:35 AM   Alcohol Cap Used Yes 7/1/2017 11:59 AM        Opportunity for questions and clarification was provided.       Patient transported with:   Registered Nurse  Tech

## 2017-07-02 LAB
ABO + RH BLD: NORMAL
BLD PROD TYP BPU: NORMAL
BLOOD GROUP ANTIBODIES SERPL: NORMAL
BPU ID: NORMAL
CROSSMATCH RESULT,%XM: NORMAL
GLUCOSE BLD STRIP.AUTO-MCNC: 105 MG/DL (ref 65–100)
GLUCOSE BLD STRIP.AUTO-MCNC: 149 MG/DL (ref 65–100)
GLUCOSE BLD STRIP.AUTO-MCNC: 170 MG/DL (ref 65–100)
GLUCOSE BLD STRIP.AUTO-MCNC: 180 MG/DL (ref 65–100)
SERVICE CMNT-IMP: ABNORMAL
SPECIMEN EXP DATE BLD: NORMAL
STATUS OF UNIT,%ST: NORMAL
UNIT DIVISION, %UDIV: 0

## 2017-07-02 PROCEDURE — 74011636637 HC RX REV CODE- 636/637: Performed by: INTERNAL MEDICINE

## 2017-07-02 PROCEDURE — 74011250637 HC RX REV CODE- 250/637: Performed by: INTERNAL MEDICINE

## 2017-07-02 PROCEDURE — 74011250636 HC RX REV CODE- 250/636: Performed by: INTERNAL MEDICINE

## 2017-07-02 PROCEDURE — 65270000029 HC RM PRIVATE

## 2017-07-02 PROCEDURE — 82962 GLUCOSE BLOOD TEST: CPT

## 2017-07-02 RX ADMIN — HEPARIN SODIUM 5000 UNITS: 5000 INJECTION, SOLUTION INTRAVENOUS; SUBCUTANEOUS at 21:53

## 2017-07-02 RX ADMIN — Medication 10 ML: at 15:38

## 2017-07-02 RX ADMIN — LORATADINE 10 MG: 10 TABLET ORAL at 08:28

## 2017-07-02 RX ADMIN — NEPHROCAP 1 CAPSULE: 1 CAP ORAL at 08:28

## 2017-07-02 RX ADMIN — RENAGEL 1600 MG: 800 TABLET ORAL at 18:20

## 2017-07-02 RX ADMIN — ASPIRIN 81 MG: 81 TABLET, COATED ORAL at 08:28

## 2017-07-02 RX ADMIN — RENAGEL 1600 MG: 800 TABLET ORAL at 11:42

## 2017-07-02 RX ADMIN — Medication 10 ML: at 21:36

## 2017-07-02 RX ADMIN — INSULIN GLARGINE 10 UNITS: 100 INJECTION, SOLUTION SUBCUTANEOUS at 21:53

## 2017-07-02 RX ADMIN — Medication 10 ML: at 06:44

## 2017-07-02 RX ADMIN — DOXERCALCIFEROL 2.5 MCG: 2.5 CAPSULE ORAL at 11:41

## 2017-07-02 RX ADMIN — HEPARIN SODIUM 5000 UNITS: 5000 INJECTION, SOLUTION INTRAVENOUS; SUBCUTANEOUS at 15:37

## 2017-07-02 RX ADMIN — HEPARIN SODIUM 5000 UNITS: 5000 INJECTION, SOLUTION INTRAVENOUS; SUBCUTANEOUS at 06:44

## 2017-07-02 RX ADMIN — RENAGEL 1600 MG: 800 TABLET ORAL at 07:57

## 2017-07-02 NOTE — PROGRESS NOTES
Primary Nurse Blanca Briggs RN and Imer Dawson RN performed a dual skin assessment on this patient No impairment noted  Dean score is 23

## 2017-07-02 NOTE — PROGRESS NOTES
[facility]  [address] [facility], [address] [place] [address]  [phone]      Medical Progress Note      NAME: Stormy Puente   :    MRM:  [id number]    Date/Time: 2017  11:21 AM       Assessment and Plan:     ESRD / ARF (acute renal failure) / Hyperkalemia - suspect progression of diabetic CKD, now with CKD 5. Did not resolved with hydration. Certainly maxide and ibuprofen contributed. Metformin and lisinopril now stopped. Appreciate renal input. She has agreed to start dialysis, line placed . Needs seat. Continue nephrocaps, sevelamer, hectorol. Otherwise ready. DM type 2 (diabetes mellitus, type 2) with renal complications - Diabetic/renal diet and counseling. SSI per protocol. Continue home Lantus. Stopped metformin. A1c useless in setting of anemia. HTN (hypertension) - Adequate control without meds, stopped Maxide and ACE.     Anemia - Hb dropped on admit with hydration, but labs consistent with chronic renal disease and acute uremia. Got transfusion of 1U pRBC. EPO per renal.     Obesity - counseled on weight loss        Subjective:     Chief Complaint:  Nervous. Overwhelmed. ROS:  (bold if positive, if negative)      Tolerating PT   Tolerating Diet        Objective:     Last 24hrs VS reviewed since prior progress note.  Most recent are:    Visit Vitals    /65 (BP 1 Location: Left arm, BP Patient Position: At rest)    Pulse 76    Temp 98.6 °F (37 °C)    Resp 17    Ht 5' 7\" (1.702 m)    Wt 90.5 kg (199 lb 8.3 oz)    SpO2 99%    BMI 31.25 kg/m2     SpO2 Readings from Last 6 Encounters:   17 99%          Intake/Output Summary (Last 24 hours) at 17 1121  Last data filed at 17 1553   Gross per 24 hour   Intake                0 ml   Output             1513 ml   Net            -1513 ml        Physical Exam:    Gen:  Obese, in no acute distress  HEENT:  Pink conjunctivae, PERRL, hearing intact to voice, moist mucous membranes  Neck:  Supple, without masses, thyroid non-tender  Resp:  No accessory muscle use, clear breath sounds without wheezes rales or rhonchi  Card:  No murmurs, normal S1, S2 without thrills, bruits or peripheral edema  Abd:  Soft, non-tender, non-distended, normoactive bowel sounds are present, no mass  Lymph:  No cervical or inguinal adenopathy  Musc:  No cyanosis or clubbing  Skin:  No rashes or ulcers, skin turgor is good  Neuro:  Cranial nerves are grossly intact, mild motor weakness, follows commands   Psych:   Moderate insight, oriented to person, place, a bit tangential    Telemetry reviewed:   normal sinus rhythm  __________________________________________________________________  Medications Reviewed: (see below)  Medications:     Current Facility-Administered Medications   Medication Dose Route Frequency    0.9% sodium chloride infusion 250 mL  250 mL IntraVENous PRN    B complex-vitaminC-folic acid (NEPHROCAP) cap  1 Cap Oral DAILY    sevelamer (RENAGEL) tablet 1,600 mg  1,600 mg Oral TID WITH MEALS    doxercalciferol (HECTOROL) capsule 2.5 mcg  2.5 mcg Oral DAILY    epoetin oma (EPOGEN;PROCRIT) injection 6,000 Units  6,000 Units SubCUTAneous Q MON, WED & FRI    sodium chloride (NS) flush 5-10 mL  5-10 mL IntraVENous Q8H    sodium chloride (NS) flush 5-10 mL  5-10 mL IntraVENous PRN    acetaminophen (TYLENOL) tablet 650 mg  650 mg Oral Q4H PRN    oxyCODONE-acetaminophen (PERCOCET) 5-325 mg per tablet 1 Tab  1 Tab Oral Q4H PRN    HYDROmorphone (PF) (DILAUDID) injection 0.5 mg  0.5 mg IntraVENous Q4H PRN    prochlorperazine (COMPAZINE) injection 10 mg  10 mg IntraVENous Q6H PRN    zolpidem (AMBIEN) tablet 5 mg  5 mg Oral QHS PRN    insulin lispro (HUMALOG) injection   SubCUTAneous AC&HS    glucose chewable tablet 16 g  4 Tab Oral PRN    dextrose (D50W) injection syrg 12.5-25 g  12.5-25 g IntraVENous PRN    glucagon (GLUCAGEN) injection 1 mg  1 mg IntraMUSCular PRN    heparin (porcine) injection 5,000 Units  5,000 Units SubCUTAneous Q8H    insulin glargine (LANTUS) injection 10 Units  10 Units SubCUTAneous QHS    aspirin delayed-release tablet 81 mg  81 mg Oral DAILY    loratadine (CLARITIN) tablet 10 mg  10 mg Oral EVERY OTHER DAY        Lab Data Reviewed: (see below)  Lab Review:     Recent Labs      07/01/17   0251  06/30/17   0430  06/30/17   0117   WBC  6.7   --   6.7   HGB  6.8*  6.7*  6.5*   HCT  20.9*   --   20.4*   PLT  320   --   313     Recent Labs      07/01/17   0251  06/30/17   0117   NA  138  139   K  4.3  4.2   CL  101  100   CO2  28  28   GLU  159*  167*   BUN  76*  80*   CREA  11.39*  10.98*   CA  7.3*  7.2*   MG  2.3  2.2   PHOS  5.5*  6.4*   ALB   --   2.3*     Lab Results   Component Value Date/Time    Glucose (POC) 180 07/02/2017 11:16 AM    Glucose (POC) 105 07/02/2017 07:19 AM    Glucose (POC) 164 07/01/2017 09:53 PM    Glucose (POC) 98 07/01/2017 04:22 PM    Glucose (POC) 209 07/01/2017 11:16 AM     No results for input(s): PH, PCO2, PO2, HCO3, FIO2 in the last 72 hours. No results for input(s): INR in the last 72 hours. No lab exists for component: INREXT  All Micro Results     Procedure Component Value Units Date/Time    CULTURE, URINE [[id number]]  (Abnormal)  (Susceptibility) Collected:  06/28/17 0922    Order Status:  Completed Specimen:  Urine from Clean catch Updated:  07/01/17 1058     Special Requests: NO SPECIAL REQUESTS        Atkinson Count --        55364  COLONIES/mL       Culture result: ESCHERICHIA COLI (A)                 MIXED UROGENITAL PATRIC ISOLATED          I have reviewed notes of prior 24hr.     Other pertinent lab: none    Total time spent with patient: 39 [address] discussed with: Patient, Nursing Staff, Consultant/Specialist and >50% of time spent in counseling and coordination of care    Discussed:  Care Plan    Prophylaxis:  H2B/PPI    Disposition:  Home w/Family           ___________________________________________________    Attending Physician: Mylene Jimenez MD

## 2017-07-02 NOTE — PROGRESS NOTES
The patient Cherylene Helper was seen and examined. She did well with her first dialysis. No adverse consequences. Hemodynamically stable and she feels well this morning. She continues to have many questions. She is interested in returning to work. Visit Vitals    /65 (BP 1 Location: Left arm, BP Patient Position: At rest)    Pulse 76    Temp 98.6 °F (37 °C)    Resp 17    Ht 5' 7\" (1.702 m)    Wt 90.5 kg (199 lb 8.3 oz)    SpO2 99%    BMI 31.25 kg/m2     Alert, oriented and in no distress  No JVD  Lungs CTA  Heart - rrr, no gallops or rubs  Abdomen - benign  No edema    Recent Labs      07/01/17   0251  06/30/17   0117   NA  138  139   K  4.3  4.2   CL  101  100   CO2  28  28   BUN  76*  80*   CREA  11.39*  10.98*   CA  7.3*  7.2*   ALB   --   2.3*   PHOS  5.5*  6.4*   MG  2.3  2.2     Recent Labs      07/01/17   0251  06/30/17   0430  06/30/17   0117   WBC  6.7   --   6.7   HGB  6.8*  6.7*  6.5*   HCT  20.9*   --   20.4*   PLT  320   --   313     No results for input(s): ANJEL, KU, CLU, CREAU in the last 72 hours. No lab exists for component: PROU    IMPRESSION:  ESRD  Diabetes mellitus  Hx of HTN    PLAN:  Believe she is scheduled for a second dialysis today but can't confirm - if not she will dialyze again tomorrow. Will get dietician to review diet with her tomorrow. Discharge once outpatient arrangements made.     Kimo Dia MD  7/2/2017

## 2017-07-03 ENCOUNTER — APPOINTMENT (OUTPATIENT)
Dept: GENERAL RADIOLOGY | Age: 62
DRG: 683 | End: 2017-07-03
Attending: INTERNAL MEDICINE
Payer: COMMERCIAL

## 2017-07-03 VITALS
RESPIRATION RATE: 16 BRPM | DIASTOLIC BLOOD PRESSURE: 54 MMHG | HEIGHT: 67 IN | TEMPERATURE: 98.5 F | SYSTOLIC BLOOD PRESSURE: 145 MMHG | HEART RATE: 78 BPM | OXYGEN SATURATION: 99 % | WEIGHT: 199.52 LBS | BODY MASS INDEX: 31.31 KG/M2

## 2017-07-03 LAB
GLUCOSE BLD STRIP.AUTO-MCNC: 168 MG/DL (ref 65–100)
GLUCOSE BLD STRIP.AUTO-MCNC: 190 MG/DL (ref 65–100)
SERVICE CMNT-IMP: ABNORMAL
SERVICE CMNT-IMP: ABNORMAL

## 2017-07-03 PROCEDURE — 36415 COLL VENOUS BLD VENIPUNCTURE: CPT | Performed by: INTERNAL MEDICINE

## 2017-07-03 PROCEDURE — 90935 HEMODIALYSIS ONE EVALUATION: CPT

## 2017-07-03 PROCEDURE — 74011250637 HC RX REV CODE- 250/637: Performed by: INTERNAL MEDICINE

## 2017-07-03 PROCEDURE — 71010 XR CHEST PORT: CPT

## 2017-07-03 PROCEDURE — 82962 GLUCOSE BLOOD TEST: CPT

## 2017-07-03 PROCEDURE — 74011250636 HC RX REV CODE- 250/636: Performed by: INTERNAL MEDICINE

## 2017-07-03 RX ORDER — INSULIN GLARGINE 100 [IU]/ML
INJECTION, SOLUTION SUBCUTANEOUS
Qty: 1 VIAL | Refills: 0 | Status: SHIPPED | OUTPATIENT
Start: 2017-07-03 | End: 2018-11-20 | Stop reason: SDUPTHER

## 2017-07-03 RX ORDER — DOXERCALCIFEROL 2.5 UG/1
2.5 CAPSULE ORAL DAILY
Qty: 30 CAP | Refills: 0 | Status: SHIPPED | OUTPATIENT
Start: 2017-07-03 | End: 2017-07-03

## 2017-07-03 RX ORDER — LORATADINE 10 MG/1
10 TABLET ORAL EVERY OTHER DAY
Qty: 1 TAB | Refills: 0 | Status: SHIPPED
Start: 2017-07-04

## 2017-07-03 RX ORDER — SEVELAMER HYDROCHLORIDE 800 MG/1
1600 TABLET, FILM COATED ORAL
Qty: 180 TAB | Refills: 0 | Status: SHIPPED | OUTPATIENT
Start: 2017-07-03 | End: 2017-08-02

## 2017-07-03 RX ADMIN — HEPARIN SODIUM 5000 UNITS: 5000 INJECTION, SOLUTION INTRAVENOUS; SUBCUTANEOUS at 05:51

## 2017-07-03 RX ADMIN — Medication 10 ML: at 05:51

## 2017-07-03 RX ADMIN — HEPARIN SODIUM 5000 UNITS: 5000 INJECTION, SOLUTION INTRAVENOUS; SUBCUTANEOUS at 14:58

## 2017-07-03 RX ADMIN — ASPIRIN 81 MG: 81 TABLET, COATED ORAL at 08:38

## 2017-07-03 RX ADMIN — RENAGEL 1600 MG: 800 TABLET ORAL at 08:38

## 2017-07-03 RX ADMIN — NEPHROCAP 1 CAPSULE: 1 CAP ORAL at 08:38

## 2017-07-03 RX ADMIN — RENAGEL 1600 MG: 800 TABLET ORAL at 11:34

## 2017-07-03 RX ADMIN — DOXERCALCIFEROL 2.5 MCG: 2.5 CAPSULE ORAL at 11:34

## 2017-07-03 NOTE — ROUTINE PROCESS
Discharge instructions given to patient and spouse. IV removed with no difficulty. Numerous questions were asked by patient and nurse was able to answer all sufficiently. Scripts faxed to [facility] per instructions from patient, fax confirmation given. Jazmine Osborne

## 2017-07-03 NOTE — PROGRESS NOTES
Bedside and Verbal shift change report given to Flor Allan RN (oncoming nurse) by Celia Silva RN (offgoing nurse). Report included the following information SBAR, Kardex, ED Summary, Procedure Summary, Intake/Output and MAR.

## 2017-07-03 NOTE — PROGRESS NOTES
Met with pt to discuss HD. She reported [facility] is convenient. Referral sent and accepted. Pt's first appt at [facility] is 11:30 (needs to be there at 11:00) on Thurs. Phone # is [phone]. Thanks.   Coco Hatfield LCSW

## 2017-07-03 NOTE — PROGRESS NOTES
Patient has a chair for HD at [facility]. [facility]- Tuesday/Thursday and [facility] [address] [facility]

## 2017-07-03 NOTE — PROGRESS NOTES
[facility]  [address] [facility] [place], [place], [address] [place] [address]  [phone]      Medical Progress Note      NAME: Kavon Hansen   :    MRM:  [id number]    Date/Time: 7/3/2017  10:25 AM       Assessment and Plan:     ESRD / ARF (acute renal failure) / Hyperkalemia - suspect progression of diabetic CKD, now with CKD 5. Did not resolved with hydration. Certainly maxide and ibuprofen contributed. Metformin and lisinopril now stopped. Appreciate renal input. She has agreed to start dialysis, line placed . Needs seat, may be ready today. Continue nephrocaps, sevelamer, hectorol. Otherwise ready. DM type 2 (diabetes mellitus, type 2) with renal complications - Diabetic/renal diet and counseling. SSI per protocol. Continue home Lantus. Stopped metformin. A1c useless in setting of anemia. HTN (hypertension) - Adequate control without meds, stopped Maxide and ACE.     Anemia - Hb dropped on admit with hydration, but labs consistent with chronic renal disease and acute uremia. Got transfusion of 1U pRBC. EPO per renal.     Obesity - counseled on weight loss        Subjective:     Chief Complaint:  Nervous. Tolerated HD overnight    ROS:  (bold if positive, if negative)      Tolerating PT   Tolerating Diet        Objective:     Last 24hrs VS reviewed since prior progress note.  Most recent are:    Visit Vitals    /67 (BP 1 Location: Right arm, BP Patient Position: At rest)    Pulse 77    Temp 98.6 °F (37 °C)    Resp 15    Ht 5' 7\" (1.702 m)    Wt 90.5 kg (199 lb 8.3 oz)    SpO2 98%    BMI 31.25 kg/m2     SpO2 Readings from Last 6 Encounters:   17 98%            Intake/Output Summary (Last 24 hours) at 17 1025  Last data filed at 17 0445   Gross per 24 hour   Intake              240 ml   Output             1650 ml   Net            -1410 ml        Physical Exam:    Gen:  Obese, in no acute distress  HEENT:  Pink conjunctivae, PERRL, hearing intact to voice, moist mucous membranes  Neck:  Supple, without masses, thyroid non-tender  Resp:  No accessory muscle use, clear breath sounds without wheezes rales or rhonchi  Card:  No murmurs, normal S1, S2 without thrills, bruits or peripheral edema  Abd:  Soft, non-tender, non-distended, normoactive bowel sounds are present, no mass  Lymph:  No cervical or inguinal adenopathy  Musc:  No cyanosis or clubbing  Skin:  No rashes or ulcers, skin turgor is good  Neuro:  Cranial nerves are grossly intact, mild motor weakness, follows commands   Psych:   Moderate insight, oriented to person, place, a bit anxious    Telemetry reviewed:   normal sinus rhythm  __________________________________________________________________  Medications Reviewed: (see below)  Medications:     Current Facility-Administered Medications   Medication Dose Route Frequency    0.9% sodium chloride infusion 250 mL  250 mL IntraVENous PRN    B complex-vitaminC-folic acid (NEPHROCAP) cap  1 Cap Oral DAILY    sevelamer (RENAGEL) tablet 1,600 mg  1,600 mg Oral TID WITH MEALS    doxercalciferol (HECTOROL) capsule 2.5 mcg  2.5 mcg Oral DAILY    epoetin oma (EPOGEN;PROCRIT) injection 6,000 Units  6,000 Units SubCUTAneous Q MON, WED & FRI    sodium chloride (NS) flush 5-10 mL  5-10 mL IntraVENous Q8H    sodium chloride (NS) flush 5-10 mL  5-10 mL IntraVENous PRN    acetaminophen (TYLENOL) tablet 650 mg  650 mg Oral Q4H PRN    oxyCODONE-acetaminophen (PERCOCET) 5-325 mg per tablet 1 Tab  1 Tab Oral Q4H PRN    HYDROmorphone (PF) (DILAUDID) injection 0.5 mg  0.5 mg IntraVENous Q4H PRN    prochlorperazine (COMPAZINE) injection 10 mg  10 mg IntraVENous Q6H PRN    zolpidem (AMBIEN) tablet 5 mg  5 mg Oral QHS PRN    insulin lispro (HUMALOG) injection   SubCUTAneous AC&HS    glucose chewable tablet 16 g  4 Tab Oral PRN    dextrose (D50W) injection syrg 12.5-25 g  12.5-25 g IntraVENous PRN    glucagon (GLUCAGEN) injection 1 mg  1 mg IntraMUSCular PRN    heparin (porcine) injection 5,000 Units  5,000 Units SubCUTAneous Q8H    insulin glargine (LANTUS) injection 10 Units  10 Units SubCUTAneous QHS    aspirin delayed-release tablet 81 mg  81 mg Oral DAILY    loratadine (CLARITIN) tablet 10 mg  10 mg Oral EVERY OTHER DAY        Lab Data Reviewed: (see below)  Lab Review:     Recent Labs      07/01/17   0251   WBC  6.7   HGB  6.8*   HCT  20.9*   PLT  320     Recent Labs      07/01/17   0251   NA  138   K  4.3   CL  101   CO2  28   GLU  159*   BUN  76*   CREA  11.39*   CA  7.3*   MG  2.3   PHOS  5.5*     Lab Results   Component Value Date/Time    Glucose (POC) 168 07/03/2017 07:26 AM    Glucose (POC) 149 07/02/2017 09:14 PM    Glucose (POC) 170 07/02/2017 04:24 PM    Glucose (POC) 180 07/02/2017 11:16 AM    Glucose (POC) 105 07/02/2017 07:19 AM     No results for input(s): PH, PCO2, PO2, HCO3, FIO2 in the last 72 hours. No results for input(s): INR in the last 72 hours. No lab exists for component: INREXT, INREXT  All Micro Results     Procedure Component Value Units Date/Time    CULTURE, URINE [[id number]]  (Abnormal)  (Susceptibility) Collected:  06/28/17 0922    Order Status:  Completed Specimen:  Urine from Clean catch Updated:  07/01/17 1058     Special Requests: NO SPECIAL REQUESTS        [place] Count --        [id number]  COLONIES/mL       Culture result: ESCHERICHIA COLI (A)                 MIXED UROGENITAL PATRIC ISOLATED          I have reviewed notes of prior 24hr.     Other pertinent lab: none    Total time spent with patient: [address] [place] [address] discussed with: Patient, Nursing Staff, Consultant/Specialist and >50% of time spent in counseling and coordination of care    Discussed:  Care Plan and D/C Planning    Prophylaxis:  H2B/PPI    Disposition:  Home w/Family           ___________________________________________________    Attending Physician: Shelbie Schaumann, MD

## 2017-07-03 NOTE — PROGRESS NOTES
Pharmacist Discharge Medication Reconciliation    Discharge Provider:  Shane Thompson       Discharge Medications:      Current Discharge Medication List        START taking these medications         Dose & Instructions Dispensing Information Comments   Morning Noon Evening Bedtime      b complex-vitamin c-folic acid 1 mg capsule   Commonly known as:  NEPHROCAPS       Your last dose was: Your next dose is:              Dose:  1 Cap   Take 1 Cap by mouth daily for 30 days. Quantity:  30 Cap   Refills:  0                         insulin glargine 100 unit/mL injection   Commonly known as:  LANTUS       Your last dose was: Your next dose is:              Inject 10 unites once daily. Quantity:  1 Vial   Refills:  0                         sevelamer 800 mg tablet   Commonly known as:  RENAGEL       Your last dose was: Your next dose is:              Dose:  1600 mg   Take 2 Tabs by mouth three (3) times daily (with meals) for 30 days. Quantity:  180 Tab   Refills:  0                               CONTINUE these medications which have CHANGED         Dose & Instructions Dispensing Information Comments   Morning Noon Evening Bedtime      loratadine 10 mg tablet   Commonly known as:  CLARITIN   Start taking on:  7/4/2017   What changed:  when to take this       Your last dose was: Your next dose is:              Dose:  10 mg   Take 1 Tab by mouth every other day. Quantity:  1 Tab   Refills:  0                               CONTINUE these medications which have NOT CHANGED         Dose & Instructions Dispensing Information Comments   Morning Noon Evening Bedtime      AMABELZ 0.5-0.1 mg per tablet   Generic drug:  estradiol-norethindrone       Your last dose was: Your next dose is:              Dose:  1 Tab   Take 1 Tab by mouth daily. Refills:  0                         aspirin delayed-release 81 mg tablet       Your last dose was:          Your next dose is:              Dose:  81 mg   Take 81 mg by mouth daily. Refills:  0                         ondansetron 4 mg disintegrating tablet   Commonly known as:  ZOFRAN ODT       Your last dose was: Your next dose is:              Dose:  4 mg   Take 4 mg by mouth every six (6) hours as needed for Nausea. Refills:  0                               STOP taking these medications              ADVIL 200 mg tablet   Generic drug:  ibuprofen           lisinopril 30 mg tablet   Commonly known as:  PRINIVIL, ZESTRIL           metFORMIN 1,000 mg tablet   Commonly known as:  GLUCOPHAGE           triamterene-hydroCHLOROthiazide 37.5-25 mg per tablet   Commonly known as:  Benji Manley                    Where to Get Your Medications        Information on where to get these meds will be given to you by the nurse or doctor. !  Ask your nurse or doctor about these medications     b complex-vitamin c-folic acid 1 mg capsule    insulin glargine 100 unit/mL injection    loratadine 10 mg tablet    sevelamer 800 mg tablet                      The patient's chart, MAR, and AVS were reviewed by   Guru Tran,   Contact: [phone]

## 2017-07-03 NOTE — PROGRESS NOTES
[facility] KIDNEY     Renal Daily Progress Note:     Admission Date: 2017     Subjective: chart reviewed and patient examined. Had dialysis early this morning. Feels ok, no N/V. Not SOB, no chest pain. May have a dialysis seat assigned, awaiting confirmation from Case management. Review of Systems  Pertinent items are noted in HPI.     Objective:     Visit Vitals    /67 (BP 1 Location: Right arm, BP Patient Position: At rest)    Pulse 77    Temp 98.6 °F (37 °C)    Resp 15    Ht 5' 7\" (1.702 m)    Wt 90.5 kg (199 lb 8.3 oz)    SpO2 98%    BMI 31.25 kg/m2     Temp (24hrs), Av.7 °F (37.1 °C), Min:98.6 °F (37 °C), Max:99.1 °F (37.3 °C)        Intake/Output Summary (Last 24 hours) at 17 1029  Last data filed at 17 0445   Gross per 24 hour   Intake              240 ml   Output             1650 ml   Net            -1410 ml     Current Facility-Administered Medications   Medication Dose Route Frequency    0.9% sodium chloride infusion 250 mL  250 mL IntraVENous PRN    B complex-vitaminC-folic acid (NEPHROCAP) cap  1 Cap Oral DAILY    sevelamer (RENAGEL) tablet 1,600 mg  1,600 mg Oral TID WITH MEALS    doxercalciferol (HECTOROL) capsule 2.5 mcg  2.5 mcg Oral DAILY    epoetin oma (EPOGEN;PROCRIT) injection 6,000 Units  6,000 Units SubCUTAneous Q MON, WED & FRI    sodium chloride (NS) flush 5-10 mL  5-10 mL IntraVENous Q8H    sodium chloride (NS) flush 5-10 mL  5-10 mL IntraVENous PRN    acetaminophen (TYLENOL) tablet 650 mg  650 mg Oral Q4H PRN    oxyCODONE-acetaminophen (PERCOCET) 5-325 mg per tablet 1 Tab  1 Tab Oral Q4H PRN    HYDROmorphone (PF) (DILAUDID) injection 0.5 mg  0.5 mg IntraVENous Q4H PRN    prochlorperazine (COMPAZINE) injection 10 mg  10 mg IntraVENous Q6H PRN    zolpidem (AMBIEN) tablet 5 mg  5 mg Oral QHS PRN    insulin lispro (HUMALOG) injection   SubCUTAneous AC&HS    glucose chewable tablet 16 g  4 Tab Oral PRN    dextrose (D50W) injection syrg 12.5-25 g  12.5-25 g IntraVENous PRN    glucagon (GLUCAGEN) injection 1 mg  1 mg IntraMUSCular PRN    heparin (porcine) injection 5,000 Units  5,000 Units SubCUTAneous Q8H    insulin glargine (LANTUS) injection 10 Units  10 Units SubCUTAneous QHS    aspirin delayed-release tablet 81 mg  81 mg Oral DAILY    loratadine (CLARITIN) tablet 10 mg  10 mg Oral EVERY OTHER DAY       Physical Exam:General appearance: alert, cooperative, no distress  Neck: supple, symmetrical, trachea midline, no adenopathy, thyroid: not enlarged, symmetric, no tenderness/mass/nodules and no JVD  Lungs: clear to auscultation bilaterally  Heart: regular rate and rhythm, no S3 or S4, no rub  Abdomen: soft, non-tender. Bowel sounds normal. No masses,  no organomegaly  Extremities: no edema  Neurologic: Grossly normal    Data Review:     LABS:  Recent Labs      07/01/17   0251   NA  138   K  4.3   CL  101   CO2  28   BUN  76*   CREA  11.39*   CA  7.3*   PHOS  5.5*   MG  2.3     Recent Labs      07/01/17   0251   WBC  6.7   HGB  6.8*   HCT  20.9*   PLT  320     No results for input(s): ANJEL, KU, CLU, CREAU in the last 72 hours.     No lab exists for component: PROU   Results for Galina Castrejon (MRN [id number]) as of 7/3/2017 10:32    6/30/2017 01:17  Hepatitis A, IgM: NONREACTIVE  Hepatitis B surface Ag: <0.10  Hep B surface Ag Interp.: NEGATIVE  Hepatitis B core, IgM: NONREACTIVE  Hep C  virus Ab Interp.: NONREACTIVE      Assessment:   Renal Specific Problems  CKD 5--->6  Anemia with renal failure, Fe sat 20%  HyperPO4  AODM        Plan:     Obtain/ Order: labs/cultures/radiology/procedures:          Therapeutic:      D/W Dr. Maria Castro, ok to d/c from renal standpoint if outpatient dialysis arranged  Will adjust EPO and IV iron as outpatient  continue Renvela but not Julissa (will give IV VitD3 in dialysis)      Reshma Rain MD    [phone]

## 2017-07-03 NOTE — DISCHARGE SUMMARY
Physician Discharge Summary     Patient ID:  Mikle Hodgkins  [id number]  75 y.o.  1955    Admit date: 6/28/2017    Discharge date and time: 7/3/2017    Admission Diagnoses: ARF (acute renal failure) ([facility]    Discharge Diagnoses:    Principal Diagnosis   ARF (acute renal failure) ([facility]                                             Other Diagnoses    HTN (hypertension) (6/28/2017)    DM type 2 (diabetes mellitus, type 2) ([facility] (6/28/2017)    Anemia (6/28/2017)    Obesity (BMI 30.0-34.9) (6/29/2017)    Hospital Course:   ESRD / ARF (acute renal failure) / Hyperkalemia - suspect progression of diabetic CKD, now with CKD 5. Did not resolved with hydration. Certainly maxide and ibuprofen contributed. Metformin and lisinopril now stopped. Appreciate renal input. She has agreed to start dialysis, line placed 6/30. Needs seat, may be ready today. Continue nephrocaps, sevelamer, hectorol. Otherwise ready.     DM type 2 (diabetes mellitus, type 2) with renal complications - Diabetic/renal diet and counseling. SSI per protocol. Continue home Lantus. Stopped metformin. A1c useless in setting of anemia.       HTN (hypertension) - Adequate control without meds, stopped Maxide and ACE.     Anemia - Hb dropped on admit with hydration, but labs consistent with chronic renal disease and acute uremia. Got transfusion of 1U pRBC. EPO per renal.      Obesity - counseled on weight loss      PCP: Marcie Covarrubias MD    Consults: Nephrology    Significant Diagnostic Studies: See Hospital Course    Discharged home in improved condition.     Discharge Exam:   /67 (BP 1 Location: Right arm, BP Patient Position: At rest)    Pulse 77    Temp 98.6 °F (37 °C)    Resp 15    Ht 5' 7\" (1.702 m)    Wt 90.5 kg (199 lb 8.3 oz)    SpO2 98%    BMI 31.25 kg/m2      Gen:  Obese, in no acute distress  HEENT:  Pink conjunctivae, PERRL, hearing intact to voice, moist mucous membranes  Neck:  Supple, without masses, thyroid non-tender  Resp:  No accessory muscle use, clear breath sounds without wheezes rales or rhonchi  Card:  No murmurs, normal S1, S2 without thrills, bruits or peripheral edema  Abd:  Soft, non-tender, non-distended, normoactive bowel sounds are present, no mass  Lymph:  No cervical or inguinal adenopathy  Musc:  No cyanosis or clubbing  Skin:  No rashes or ulcers, skin turgor is good  Neuro:  Cranial nerves are grossly intact, mild motor weakness, follows commands   Psych: Moderate insight, oriented to person, place, a bit anxious    Patient Instructions:   Current Discharge Medication List      START taking these medications    Details   b complex-vitamin c-folic acid (NEPHROCAPS) 1 mg capsule Take 1 Cap by mouth daily for 30 days. Qty: 30 Cap, Refills: 0      sevelamer (RENAGEL) 800 mg tablet Take 2 Tabs by mouth three (3) times daily (with meals) for 30 days. Qty: 180 Tab, Refills: 0      insulin glargine (LANTUS) 100 unit/mL injection Inject 10 unites once daily. Qty: 1 Vial, Refills: 0         CONTINUE these medications which have NOT CHANGED    Details   estradiol-norethindrone (AMABELZ) 0.5-0.1 mg per tablet Take 1 Tab by mouth daily. ondansetron (ZOFRAN ODT) 4 mg disintegrating tablet Take 4 mg by mouth every six (6) hours as needed for Nausea. aspirin delayed-release 81 mg tablet Take 81 mg by mouth daily. loratadine (CLARITIN) 10 mg tablet Take 10 mg by mouth daily.          STOP taking these medications       metFORMIN (GLUCOPHAGE) 1,000 mg tablet Comments:   Reason for Stopping:         lisinopril (PRINIVIL, ZESTRIL) 30 mg tablet Comments:   Reason for Stopping:         triamterene-hydroCHLOROthiazide (MAXZIDE) 37.5-25 mg per tablet Comments:   Reason for Stopping:         ibuprofen (ADVIL) 200 mg tablet Comments:   Reason for Stopping:             Activity: Activity as tolerated  Diet: Diabetic Diet and Renal Diet  Wound Care: Reinforce dressing PRN and As directed    Follow-up with your PCP and kidney doctor in a few days.   Follow-up tests/labs - none    Signed:  Leonid Mensah MD  7/3/2017  10:28 AM

## 2017-07-03 NOTE — DISCHARGE INSTRUCTIONS
Patient Discharge Instructions    Kavon Hansen / [id number] : 1955    Admitted 2017 Discharged: 7/3/2017     Primary Diagnoses  Problem List as of 7/3/2017  Date Reviewed: 2017          Codes Class Noted - Resolved   Obesity (BMI 30.0-34.9) (Chronic)   * (Principal)ARF (acute renal failure) (HCC)   HTN (hypertension) (Chronic)   DM type 2 (diabetes mellitus, type 2) (HCC) (Chronic)   Anemia          Take Home Medications       · It is important that you take the medication exactly as they are prescribed. · Keep your medication in the bottles provided by the pharmacist and keep a list of the medication names, dosages, and times to be taken in your wallet. · Do not take other medications without consulting your doctor. What to do at Home    Recommended diet: Diabetic Diet and Renal Diet    Recommended activity: Activity as tolerated    If you experience worse symptoms, please follow up with your dialysis doctor. Follow-up with your PCP in a few weeks, dialysis on Tuesday, Thursday and Saturday       Kidney Dialysis: Care Instructions  Your Care Instructions  Dialysis is a process that filters wastes from the blood when your kidneys can no longer do the job. It is not a cure, but it can help you live longer and feel better. It is a lifesaving treatment when you have kidney failure. Normal kidneys work 24 hours a day to clean wastes from your blood. Your kidneys are not able to do this job, so a process called dialysis will do some of the work for your kidneys. You and your doctor will decide which type of dialysis you should have. Peritoneal dialysis uses the lining of your belly (peritoneum) to filter your blood. You can do it at home, on a daily basis. Hemodialysis uses a man-made filter called a dialyzer to clean your blood. Most people need to go to a hospital or clinic 3 days a week for several hours each time. Sometimes hemodialysis can be done at home.   It is normal to have questions about your treatment, and you have a right to know what is happening to you. Learning about dialysis can help you take an active role in your treatment. Dialysis does not cure kidney disease, but it can help you live longer and feel better. You will need to follow your diet and treatment schedule carefully. Follow-up care is a key part of your treatment and safety. Be sure to make and go to all appointments, and call your doctor if you are having problems. It's also a good idea to know your test results and keep a list of the medicines you take. What do you need to know about peritoneal dialysis? Peritoneal dialysis uses the lining of your belly (or peritoneal membrane) to filter your blood. Before you can begin peritoneal dialysis, your doctor will need to place a thin tube called a catheter in your belly. This is the dialysis access. · Peritoneal dialysis can be done at home or in any clean place. You may be able to do it while you sleep. · You can do it by yourself. You do not have to rely on help from others. · You can do it at the times you choose as long as you do the right number of treatments. · It has to be done every day of the week. · Some people find it hard to do all the required steps. · It increases your chance for a serious infection of the lining of the belly (peritoneum). What do you need to know about hemodialysis? Hemodialysis uses a man-made membrane called a dialyzer to clean your blood. You are connected to the dialyzer by tubes attached to your blood vessels. Before you start hemodialysis, your doctor will create a site where the blood can flow in and out of your body during your dialysis sessions. This site is called the vascular access. It may be a fistula, made by connecting an artery and a vein. Or it may be a graft, which is a tube implanted under your skin. · Hemodialysis is done mainly by trained health workers who can watch for any problems.   · It allows you to be in contact with other people having dialysis. This can help provide emotional support. · You can schedule your treatments in the evenings so you can keep working. · You may be able to do home hemodialysis, which gives you more control over your schedule. · It usually needs to be done on a set schedule 3 times a week. · It can cause side effects. The most common side effects are low blood pressure and muscle cramps. These can often be treated easily. · It requires needle sticks during every treatment, which bothers some people. Others get used to it and even do the needle sticks themselves. How can you care for yourself at home? · Be sure to have all of your dialysis sessions. Do not try to shorten or skip your sessions. You have a better chance of a longer and healthier life by getting your full treatment. · Your doctor or health care team will show you the steps you need to go through each day before, during, and after dialysis. Be sure to follow these steps. If you do not understand a step, talk to your team.  · Your doctor and dietitian will help you design menus that follow your diet. Be sure to follow your diet guidelines. ¨ You will need to limit fluids and certain foods that contain salt (sodium), potassium, and phosphorus. ¨ You may need to follow a heart-healthy diet to keep the fat (cholesterol) in your blood under control. ¨ You may need higher levels of protein in your diet. · Your doctor may recommend certain vitamins. But do not take any other medicine, including over-the-counter medicines, vitamins, and herbal products, without talking to your doctor first.  · Do not smoke. Smoking raises your risk of many health problems, including more kidney damage. If you need help quitting, talk to your doctor about stop-smoking programs and medicines. These can increase your chances of quitting for good.   · Do not take ibuprofen (Advil, Motrin), naproxen (Aleve), or similar medicines, unless your doctor tells you to. These medicines may make kidney problems worse. When should you call for help? Call 911 anytime you think you may need emergency care. For example, call if:  · You passed out (lost consciousness). · You have severe shortness of breath. Call your doctor now or seek immediate medical care if:  · You have swelling in your hands or feet. · You are dizzy or lightheaded, or you feel like you may faint. · You have an unusual weight gain. · You have trembling or trouble thinking clearly. · You have a fever. Watch closely for changes in your health, and be sure to contact your doctor if:  · You have any problems with dialysis or your diet. Where can you learn more? Go to http://reji-yue.info/. Enter G289 in the search box to learn more about \"Kidney Dialysis: Care Instructions. \"  Current as of: April 3, 2017  Content Version: 11.3  © 1857-5610 [employer] Care instructions adapted under license by [employer] (which disclaims liability or warranty for this information). If you have questions about a medical condition or this instruction, always ask your healthcare professional. [place] [address] any warranty or liability for your use of this information. Information obtained by :  I understand that if any problems occur once I am at home I am to contact my physician. I understand and acknowledge receipt of the instructions indicated above.                                                                                                                                            Physician's or R.N.'s Signature                                                                  Date/Time                                                                                                                                              Patient or Representative Signature                                                          Date/Time

## 2017-07-03 NOTE — PROCEDURES
[facility]  [phone]    Vitals   Pre   Post   Assessment   Pre   Post     Temp  Temp: 98.7 °F (37.1 °C) (07/03/17 0212)  98.6 LOC  A & o x 3 No change   HR   Pulse (Heart Rate): 67 (07/03/17 0212) 76 Lungs   Clear  clear   B/P   BP: 163/90 (07/03/17 0212) 148/75 Cardiac   s1s2 reg  no change   Resp   Resp Rate: 14 (07/03/17 0212) 14 Skin   Incision R chest perm cath placement  no change   Pain level  Pain Intensity 1: 0 (07/02/17 2000) 0 Edema  ble trace     None noted   Orders:    Duration:   Start:   Procedure Start Time: 0212 End:   3810 Total:   2 hrs 30 mins   Dialyzer:   Dialyzer/Set Up Inspection: Dewey Kiran (07/03/17 0212)   K Bath:   Dialysate K (mEq/L): 3 (07/03/17 0212)   Ca Bath:   Dialysate CA (mEq/L): 2.5 (07/03/17 0212)   Na/Bicarb:   Dialysate NA (mEq/L): 140 (07/03/17 0212)   Target Fluid Removal:   Goal/Amount of Fluid to Remove (mL): 1500 mL (07/03/17 0212)   Access     Type & Location:   R chest perm cath    Pt and access assessed. x2 ports of permcath disinfected with Alcavis per policy. Each lumen aspirated for blood return and flushed with Normal Saline per policy. Dialysis initiated. Tx ended. Pt rinsed back. Central line catheter flushed with normal saline per policy. Ports disinfected with Alcavis per policy and lines disconnected and capped using aseptic technique. See LDA docflowsheet for dressing information.      Labs     Obtained/Reviewed   Critical Results Called   Date when labs were drawn-  Hgb-    HGB   Date Value Ref Range Status   07/01/2017 6.8 (L) 11.5 - 16.0 g/dL Final     K-    Potassium   Date Value Ref Range Status   07/01/2017 4.3 3.5 - 5.1 mmol/L Final     Ca-   Calcium   Date Value Ref Range Status   07/01/2017 7.3 (L) 8.5 - 10.1 MG/DL Final     Bun-   BUN   Date Value Ref Range Status   07/01/2017 76 (H) 6 - 20 MG/DL Final     Creat-   Creatinine   Date Value Ref Range Status   07/01/2017 11.39 (H) 0.55 - 1.02 MG/DL Final Medications/ Blood Products Given     Name   Dose   Route and Time        None per RN             Blood Volume Processed (BVP):    40.2 Net Fluid   Removed:  1500ml   Comments   Time Out Done: 1397  Primary Nurse Rpt Pre:Landry Carlisle RN  Primary Nurse Rpt Post: Rocio Meade RN  Pt Education: procedural, diet. Treatment options, diet, fluid intake management  Care Plan:PLAN:  Believe she is scheduled for a second dialysis today but can't confirm - if not she will dialyze again tomorrow. Will get dietician to review diet with her tomorrow. Discharge once outpatient arrangements made. Tx Summary:    Pt tolerated 2.5 hrs of hemodialysis. No other concerns voiced or irregularities noted post hd. SBAR done at bedside with Moon Morin RN. Call light on hand, bed low, and wheels locked. Admiting Diagnosis:ARF  Pt's previous clinic- new [facility]  Consent signed - Informed Consent Verified: Yes (07/03/17 0212)  Frankita Consent - yes 7/2/2017  Hepatitis Status- Neg 6/30/17 Hep B sAg  Machine #- Machine Number: [id number] (07/03/17 1752)  Telemetry status- non tele  Pre-dialysis wt. - Pre-Dialysis Weight: 90.5 kg (199 lb 8.3 oz) (07/03/17 0210)

## 2017-07-05 ENCOUNTER — HOSPITAL ENCOUNTER (OUTPATIENT)
Dept: MAMMOGRAPHY | Age: 62
Discharge: HOME | End: 2017-07-05
Attending: OBSTETRICS & GYNECOLOGY

## 2017-07-05 DIAGNOSIS — Z12.31 VISIT FOR SCREENING MAMMOGRAM: ICD-10-CM

## 2017-12-19 ENCOUNTER — HOSPITAL ENCOUNTER (OUTPATIENT)
Dept: MAMMOGRAPHY | Age: 62
Discharge: HOME | End: 2017-12-19
Attending: OBSTETRICS & GYNECOLOGY
Payer: COMMERCIAL

## 2017-12-19 DIAGNOSIS — Z12.39 SCREENING BREAST EXAMINATION: ICD-10-CM

## 2017-12-19 PROCEDURE — 77067 SCR MAMMO BI INCL CAD: CPT

## 2018-06-30 LAB
HBA1C MFR BLD HPLC: 6.6 %
LDL-C, EXTERNAL: 78

## 2018-11-20 ENCOUNTER — OFFICE VISIT (OUTPATIENT)
Dept: FAMILY MEDICINE CLINIC | Age: 63
End: 2018-11-20

## 2018-11-20 VITALS
DIASTOLIC BLOOD PRESSURE: 75 MMHG | OXYGEN SATURATION: 100 % | TEMPERATURE: 97.8 F | WEIGHT: 196 LBS | RESPIRATION RATE: 16 BRPM | SYSTOLIC BLOOD PRESSURE: 117 MMHG | BODY MASS INDEX: 30.76 KG/M2 | HEART RATE: 73 BPM | HEIGHT: 67 IN

## 2018-11-20 DIAGNOSIS — I10 ESSENTIAL HYPERTENSION: ICD-10-CM

## 2018-11-20 DIAGNOSIS — N17.9 ACUTE RENAL FAILURE, UNSPECIFIED ACUTE RENAL FAILURE TYPE (HCC): ICD-10-CM

## 2018-11-20 DIAGNOSIS — Z79.4 TYPE 2 DIABETES MELLITUS WITHOUT COMPLICATION, WITH LONG-TERM CURRENT USE OF INSULIN (HCC): Primary | ICD-10-CM

## 2018-11-20 DIAGNOSIS — E78.00 ELEVATED CHOLESTEROL: ICD-10-CM

## 2018-11-20 DIAGNOSIS — E11.9 TYPE 2 DIABETES MELLITUS WITHOUT COMPLICATION, WITH LONG-TERM CURRENT USE OF INSULIN (HCC): Primary | ICD-10-CM

## 2018-11-20 RX ORDER — NYSTATIN AND TRIAMCINOLONE ACETONIDE 100000; 1 [USP'U]/G; MG/G
CREAM TOPICAL
COMMUNITY
End: 2018-11-20 | Stop reason: SDUPTHER

## 2018-11-20 RX ORDER — NYSTATIN AND TRIAMCINOLONE ACETONIDE 100000; 1 [USP'U]/G; MG/G
CREAM TOPICAL
Qty: 30 G | Refills: 1 | Status: CANCELLED | OUTPATIENT
Start: 2018-11-20

## 2018-11-20 RX ORDER — ATORVASTATIN CALCIUM 40 MG/1
TABLET, FILM COATED ORAL
COMMUNITY
Start: 2018-11-09 | End: 2018-12-10 | Stop reason: SDUPTHER

## 2018-11-20 RX ORDER — NYSTATIN AND TRIAMCINOLONE ACETONIDE 100000; 1 [USP'U]/G; MG/G
CREAM TOPICAL
Qty: 30 G | Refills: 1 | Status: SHIPPED | OUTPATIENT
Start: 2018-11-20 | End: 2019-09-12 | Stop reason: SDUPTHER

## 2018-11-20 RX ORDER — INSULIN DETEMIR 100 [IU]/ML
INJECTION, SOLUTION SUBCUTANEOUS
Qty: 3 PEN | Refills: 6 | Status: SHIPPED | OUTPATIENT
Start: 2018-11-20 | End: 2019-05-21 | Stop reason: SDUPTHER

## 2018-11-20 RX ORDER — SEVELAMER CARBONATE 800 MG/1
1600 TABLET, FILM COATED ORAL
COMMUNITY
Start: 2018-09-12 | End: 2019-09-12 | Stop reason: ALTCHOICE

## 2018-11-20 RX ORDER — LIDOCAINE AND PRILOCAINE 25; 25 MG/G; MG/G
CREAM TOPICAL
COMMUNITY
Start: 2018-11-16

## 2018-11-20 RX ORDER — METOPROLOL SUCCINATE 50 MG/1
50 TABLET, EXTENDED RELEASE ORAL DAILY
COMMUNITY
Start: 2018-11-08 | End: 2019-05-21 | Stop reason: SDUPTHER

## 2018-11-20 RX ORDER — CLOPIDOGREL BISULFATE 75 MG/1
TABLET ORAL
Refills: 11 | COMMUNITY
Start: 2018-09-10 | End: 2019-05-21 | Stop reason: SDUPTHER

## 2018-11-20 RX ORDER — INSULIN DETEMIR 100 [IU]/ML
INJECTION, SOLUTION SUBCUTANEOUS
Refills: 6 | COMMUNITY
Start: 2018-11-07 | End: 2018-11-20 | Stop reason: SDUPTHER

## 2018-11-20 RX ORDER — NITROGLYCERIN 0.4 MG/1
TABLET SUBLINGUAL
Refills: 0 | COMMUNITY
Start: 2018-08-16

## 2018-11-20 RX ORDER — PEN NEEDLE, DIABETIC 30 GX3/16"
NEEDLE, DISPOSABLE MISCELLANEOUS
Qty: 1 PACKAGE | Refills: 11 | Status: SHIPPED | OUTPATIENT
Start: 2018-11-20 | End: 2019-09-12 | Stop reason: SDUPTHER

## 2018-11-20 NOTE — PROGRESS NOTES
Assessment/Plan:     Diagnoses and all orders for this visit:    1. Type 2 diabetes mellitus without complication, with long-term current use of insulin (HCC)  -     LEVEMIR FLEXTOUCH U-100 INSULN 100 unit/mL (3 mL) inpn; INJ 10 UNITS UNDER THE SKIN QD  -     Insulin Needles, Disposable, 31 gauge x 5/16\" ndle; Change needle after each injection  - Stable, change to levemir, monitor blood glucose, labs today, follow up 3-6 months based on labs    2. Acute renal failure, unspecified acute renal failure type ([address]  -     METABOLIC PANEL, BASIC  -     MICROALBUMIN, UR, RAND W/ MICROALB/CREAT RATIO    3. Essential hypertension   -stable, continue therapy    4. Elevated cholesterol  -     LIPID PANEL  -     HEPATIC FUNCTION PANEL        Follow-up Disposition:  Return in about 3 months (around 2/20/2019) for Follow Up. Discussed expected course/resolution/complications of diagnosis in detail with patient.    Medication risks/benefits/costs/interactions/alternatives discussed with patient.    Pt was given after visit summary which includes diagnoses, current medications & vitals. Pt expressed understanding with the diagnosis and plan        Subjective:      Jonathan Rizvi is a 59 y.o. female who presents for had concerns including Medication Evaluation (lantus no longer covered; pts nephrologist, Dr. Motley Notice, prescribed Levemir, which pt would like to discuss) and Labs (requesting routine lab work, if needed). Here today for diabetes follow up. Had stents put in the LAD and RCA in September. Last A1c was 6.6. Insurance wants her to be on Levemir. Denies ever having hypoglycemia. Is currently on dialysis and still urinates. She is not familiar with her dialysis labs as she states \"they do not tell her\"  A1c has been stable in the past.  She eats a healthy diet. She would like her cholesterol drawn today.       Current Outpatient Medications   Medication Sig Dispense Refill    clopidogrel (PLAVIX) 75 mg tab TK 1 T PO D  11    atorvastatin (LIPITOR) 40 mg tablet       sevelamer carbonate (RENVELA) 800 mg tab tab Take 1,600 mg by mouth three (3) times daily (with meals).  metoprolol succinate (TOPROL-XL) 50 mg XL tablet       LEVEMIR FLEXTOUCH U-100 INSULN 100 unit/mL (3 mL) inpn INJ 10 UNITS UNDER THE SKIN QD 3 Pen 6    Insulin Needles, Disposable, 31 gauge x 5/16\" ndle Change needle after each injection 1 Package 11    loratadine (CLARITIN) 10 mg tablet Take 1 Tab by mouth every other day. 1 Tab 0    aspirin delayed-release 81 mg tablet Take 81 mg by mouth daily.  lidocaine-prilocaine (EMLA) topical cream       nitroglycerin (NITROSTAT) 0.4 mg SL tablet   0    nystatin-triamcinolone (MYCOLOG II) topical cream Apply  to affected area two (2) times daily as needed for Skin Irritation or Itching. 30 g 1    ondansetron (ZOFRAN ODT) 4 mg disintegrating tablet Take 4 mg by mouth every six (6) hours as needed for Nausea. Allergies   Allergen Reactions    Penicillins Swelling       ROS:   Review of Systems   Respiratory: Negative for shortness of breath. Cardiovascular: Negative for chest pain, palpitations and leg swelling. Genitourinary: Negative for frequency. Neurological: Negative for dizziness and headaches. Endo/Heme/Allergies: Negative for polydipsia. Objective:     Visit Vitals  /75 (BP 1 Location: Left arm, BP Patient Position: Sitting)   Pulse 73   Temp 97.8 °F (36.6 °C) (Oral)   Resp 16   Ht 5' 7.25\" (1.708 m)   Wt 196 lb (88.9 kg)   SpO2 100%   BMI 30.47 kg/m²       Vitals and Nurse Documentation reviewed. Physical Exam   Constitutional: She is well-developed, well-nourished, and in no distress. No distress. HENT:   Head: Normocephalic and atraumatic. Cardiovascular: Normal rate, regular rhythm and normal heart sounds. Exam reveals no gallop and no friction rub. No murmur heard.   Pulmonary/Chest: Effort normal and breath sounds normal. No respiratory distress. She has no wheezes. She has no rales. Neurological: She is alert. Skin: She is not diaphoretic.    Psychiatric: Affect normal.       Results for orders placed or performed in visit on 11/20/18   AMB EXT LDL-C   Result Value Ref Range    LDL-C, External 78    AMB EXT HGBA1C   Result Value Ref Range    Hemoglobin A1c, External 6.6

## 2018-11-20 NOTE — PATIENT INSTRUCTIONS

## 2018-11-20 NOTE — PROGRESS NOTES
Identified pt with two pt identifiers(name and ). Reviewed record in preparation for visit and have obtained necessary documentation. Chief Complaint   Patient presents with    Medication Evaluation     lantus no longer covered; pts nephrologist, Dr. Ata Wright, prescribed Levemir, which pt would like to discuss    Labs     requesting routine lab work, if needed        Health Maintenance Due   Topic    LIPID PANEL Q1     MICROALBUMIN Q1     EYE EXAM RETINAL OR DILATED Q1     Pneumococcal 19-64 Highest Risk (1 of 3 - PCV13)    DTaP/Tdap/Td series (1 - Tdap)    PAP AKA CERVICAL CYTOLOGY     Shingrix Vaccine Age 50> (1 of 2)    HEMOGLOBIN A1C Q6M     FOBT Q 1 YEAR AGE 50-75     MEDICARE YEARLY EXAM        Coordination of Care Questionnaire:  :   1) Have you been to an emergency room, urgent care, or hospitalized since your last visit?   no   If yes, where when, and reason for visit? 2. Have seen or consulted any other health care provider since your last visit? YES  If yes, where when, and reason for visit? Dr. Jose C Mehta (nephro)    3) Do you have an Advanced Directive/ Living Will in place? NO  If yes, do we have a copy on file NO  If no, would you like information NO    Patient is accompanied by self I have received verbal consent from Rodrick Hernández to discuss any/all medical information while they are present in the room.

## 2018-12-10 RX ORDER — ATORVASTATIN CALCIUM 40 MG/1
TABLET, FILM COATED ORAL
Qty: 90 TAB | Refills: 0 | Status: SHIPPED | OUTPATIENT
Start: 2018-12-10 | End: 2019-03-05 | Stop reason: SDUPTHER

## 2018-12-27 ENCOUNTER — HOSPITAL ENCOUNTER (OUTPATIENT)
Dept: MAMMOGRAPHY | Age: 63
Discharge: HOME | End: 2018-12-27
Attending: OBSTETRICS & GYNECOLOGY
Payer: COMMERCIAL

## 2018-12-27 DIAGNOSIS — Z12.39 SCREENING BREAST EXAMINATION: ICD-10-CM

## 2018-12-27 PROCEDURE — 77067 SCR MAMMO BI INCL CAD: CPT

## 2019-03-07 ENCOUNTER — TELEPHONE (OUTPATIENT)
Dept: FAMILY MEDICINE CLINIC | Age: 64
End: 2019-03-07

## 2019-03-07 RX ORDER — ATORVASTATIN CALCIUM 40 MG/1
TABLET, FILM COATED ORAL
Qty: 90 TAB | Refills: 0 | Status: SHIPPED | OUTPATIENT
Start: 2019-03-07 | End: 2019-05-21 | Stop reason: SDUPTHER

## 2019-03-07 NOTE — TELEPHONE ENCOUNTER
Patient is calling to see when the prescription will be filled is all out .    Requested Prescriptions     Pending Prescriptions Disp Refills    atorvastatin (LIPITOR) 40 mg tablet [Pharmacy Med Name: ATORVASTATIN 40MG TABLETS] 90 Tab 0     Sig: TAKE 1 TABLET BY MOUTH DAILY

## 2019-04-26 LAB
BUN SERPL-MCNC: 21 MG/DL (ref 8–27)
BUN/CREAT SERPL: 6 (ref 12–28)
CALCIUM SERPL-MCNC: 9.3 MG/DL (ref 8.7–10.3)
CHLORIDE SERPL-SCNC: 96 MMOL/L (ref 96–106)
CHOLEST SERPL-MCNC: 104 MG/DL (ref 100–199)
CO2 SERPL-SCNC: 29 MMOL/L (ref 20–29)
CREAT SERPL-MCNC: 3.54 MG/DL (ref 0.57–1)
CREAT UR-MCNC: NORMAL MG/DL
GLUCOSE SERPL-MCNC: 186 MG/DL (ref 65–99)
HDLC SERPL-MCNC: 36 MG/DL
LDLC SERPL CALC-MCNC: 29 MG/DL (ref 0–99)
MICROALBUMIN UR-MCNC: NORMAL
POTASSIUM SERPL-SCNC: 3.9 MMOL/L (ref 3.5–5.2)
SODIUM SERPL-SCNC: 140 MMOL/L (ref 134–144)
TRIGL SERPL-MCNC: 194 MG/DL (ref 0–149)
VLDLC SERPL CALC-MCNC: 39 MG/DL (ref 5–40)

## 2019-05-06 ENCOUNTER — TELEPHONE (OUTPATIENT)
Dept: FAMILY MEDICINE CLINIC | Age: 64
End: 2019-05-06

## 2019-05-06 NOTE — TELEPHONE ENCOUNTER
----- Message from Fco Gannon NP sent at 5/5/2019  8:08 PM EDT -----  Please have her make follow up apt

## 2019-05-21 ENCOUNTER — OFFICE VISIT (OUTPATIENT)
Dept: FAMILY MEDICINE CLINIC | Age: 64
End: 2019-05-21

## 2019-05-21 VITALS
HEART RATE: 73 BPM | SYSTOLIC BLOOD PRESSURE: 129 MMHG | DIASTOLIC BLOOD PRESSURE: 75 MMHG | RESPIRATION RATE: 16 BRPM | WEIGHT: 202 LBS | HEIGHT: 67 IN | TEMPERATURE: 98.4 F | BODY MASS INDEX: 31.71 KG/M2

## 2019-05-21 DIAGNOSIS — Z79.4 TYPE 2 DIABETES MELLITUS WITHOUT COMPLICATION, WITH LONG-TERM CURRENT USE OF INSULIN (HCC): ICD-10-CM

## 2019-05-21 DIAGNOSIS — I10 ESSENTIAL HYPERTENSION: ICD-10-CM

## 2019-05-21 DIAGNOSIS — Z95.5 S/P CORONARY ARTERY STENT PLACEMENT: ICD-10-CM

## 2019-05-21 DIAGNOSIS — E78.00 ELEVATED CHOLESTEROL: ICD-10-CM

## 2019-05-21 DIAGNOSIS — E11.9 TYPE 2 DIABETES MELLITUS WITHOUT COMPLICATION, WITH LONG-TERM CURRENT USE OF INSULIN (HCC): ICD-10-CM

## 2019-05-21 DIAGNOSIS — N17.9 ACUTE RENAL FAILURE ON DIALYSIS (HCC): Primary | ICD-10-CM

## 2019-05-21 DIAGNOSIS — Z99.2 ACUTE RENAL FAILURE ON DIALYSIS (HCC): Primary | ICD-10-CM

## 2019-05-21 RX ORDER — METOPROLOL SUCCINATE 50 MG/1
50 TABLET, EXTENDED RELEASE ORAL DAILY
Qty: 90 TAB | Refills: 1 | Status: SHIPPED | OUTPATIENT
Start: 2019-05-21 | End: 2019-09-12 | Stop reason: SDUPTHER

## 2019-05-21 RX ORDER — INSULIN DETEMIR 100 [IU]/ML
INJECTION, SOLUTION SUBCUTANEOUS
Qty: 3 PEN | Refills: 6 | Status: SHIPPED | OUTPATIENT
Start: 2019-05-21 | End: 2019-09-12 | Stop reason: SDUPTHER

## 2019-05-21 RX ORDER — ATORVASTATIN CALCIUM 40 MG/1
TABLET, FILM COATED ORAL
Qty: 90 TAB | Refills: 1 | Status: SHIPPED | OUTPATIENT
Start: 2019-05-21 | End: 2019-09-12 | Stop reason: SDUPTHER

## 2019-05-21 RX ORDER — CLOPIDOGREL BISULFATE 75 MG/1
TABLET ORAL
Qty: 90 TAB | Refills: 1 | Status: SHIPPED | OUTPATIENT
Start: 2019-05-21 | End: 2019-09-12 | Stop reason: SDUPTHER

## 2019-05-21 NOTE — PROGRESS NOTES
Identified pt with two pt identifiers(name and ). Reviewed record in preparation for visit and have obtained necessary documentation. Chief Complaint   Patient presents with    Results     review recent lab results    Medication Refill        Health Maintenance Due   Topic    Pneumococcal 0-64 years (1 of 1 - PPSV23)    EYE EXAM RETINAL OR DILATED     DTaP/Tdap/Td series (1 - Tdap)    PAP AKA CERVICAL CYTOLOGY     Shingrix Vaccine Age 50> (1 of 2)    FOBT Q 1 YEAR AGE 54-65     MEDICARE YEARLY EXAM     HEMOGLOBIN A1C Q6M        Coordination of Care Questionnaire:  :   1) Have you been to an emergency room, urgent care, or hospitalized since your last visit? If yes, where when, and reason for visit? no       2. Have seen or consulted any other health care provider since your last visit? If yes, where when, and reason for visit? YES  [place] [facility] [place] [place] is accompanied by self I have received verbal consent from Ellard Primrose to discuss any/all medical information while they are present in the room.

## 2019-05-21 NOTE — PROGRESS NOTES
Assessment/Plan:     Diagnoses and all orders for this visit:    1. Acute renal failure on dialysis ([facility] [address]    2. Type 2 diabetes mellitus without complication, with long-term current use of insulin (HCC)  -     LEVEMIR FLEXTOUCH U-100 INSULN 100 unit/mL (3 mL) inpn; INJ 10 UNITS UNDER THE SKIN QD  -     HEMOGLOBIN A1C WITH EAG  - Presumed stable, labs today. Will adjust meds based on labs    3. S/P coronary artery stent placement  -     clopidogrel (PLAVIX) 75 mg tab; TK 1 T PO D  - Stable, continue current therapy    4. Essential hypertension  -     metoprolol succinate (TOPROL-XL) 50 mg XL tablet; Take 1 Tab by mouth daily.  - Stable, continue current therapy. Refill today    5. Elevated cholesterol  -     atorvastatin (LIPITOR) 40 mg tablet; TAKE 1 TABLET BY MOUTH DAILY  - Presumed stable, labs today        Follow-up and Dispositions    · Return in about 6 months (around 11/21/2019) for Follow Up. Discussed expected course/resolution/complications of diagnosis in detail with patient.    Medication risks/benefits/costs/interactions/alternatives discussed with patient.    Pt was given after visit summary which includes diagnoses, current medications & vitals. Pt expressed understanding with the diagnosis and plan        Subjective:      Dorie Haynes is a 59 y.o. female who presents for had concerns including Results (review recent lab results) and Medication Refill. Here today for lab follow up. Is on dialysis for end stage renal failure. Creatinine clearance improved and GFR improved since July 2018. Does Dialysis Three times a week. BP at goal today. Denies CP, SOB, palpitations or leg swelling. Saw cardiologist in April. Diabetes  History of type 2 diabetes. Takes Levemir 10 units daily. Last A1c was 6.9 in July 2018. States sometimes fasting blood sugar is around 200. States she eats a healthy diet. Denies exercise.       Cholesterol at goal.        Current Outpatient Medications Medication Sig Dispense Refill    Vit B Cplx #43-TB-T-Biot-Zinc (DIALYVITE) [phone] mg-mg-mcg-mg tab Take  by mouth daily as needed.  metoprolol succinate (TOPROL-XL) 50 mg XL tablet Take 1 Tab by mouth daily. 90 Tab 1    LEVEMIR FLEXTOUCH U-100 INSULN 100 unit/mL (3 mL) inpn INJ 10 UNITS UNDER THE SKIN QD 3 Pen 6    clopidogrel (PLAVIX) 75 mg tab TK 1 T PO D 90 Tab 1    atorvastatin (LIPITOR) 40 mg tablet TAKE 1 TABLET BY MOUTH DAILY 90 Tab 1    sevelamer carbonate (RENVELA) 800 mg tab tab Take 1,600 mg by mouth three (3) times daily (with meals).  Insulin Needles, Disposable, 31 gauge x 5/16\" ndle Change needle after each injection 1 Package 11    loratadine (CLARITIN) 10 mg tablet Take 1 Tab by mouth every other day. 1 Tab 0    aspirin delayed-release 81 mg tablet Take 81 mg by mouth daily.  lidocaine-prilocaine (EMLA) topical cream       nitroglycerin (NITROSTAT) 0.4 mg SL tablet   0    nystatin-triamcinolone (MYCOLOG II) topical cream Apply  to affected area two (2) times daily as needed for Skin Irritation or Itching. 30 g 1       Allergies   Allergen Reactions    Penicillins Swelling       ROS:   Review of Systems   Constitutional: Negative for fever and malaise/fatigue. Respiratory: Negative for cough and shortness of breath. Cardiovascular: Negative for chest pain, palpitations and leg swelling. Neurological: Negative for dizziness and headaches. Objective:     Visit Vitals  /75   Pulse 73   Temp 98.4 °F (36.9 °C) (Oral)   Resp 16   Ht 5' 7.25\" (1.708 m)   Wt 202 lb (91.6 kg)   BMI 31.40 kg/m²       Vitals and Nurse Documentation reviewed. Physical Exam   Constitutional: She is well-developed, well-nourished, and in no distress. No distress. HENT:   Head: Normocephalic and atraumatic. Cardiovascular: Normal rate, regular rhythm and normal heart sounds. Exam reveals no gallop and no friction rub. No murmur heard.   Pulmonary/Chest: Effort normal and breath sounds normal. No respiratory distress. She has no wheezes. She has no rales. Neurological: She is alert. Skin: She is not diaphoretic.    Psychiatric: Affect normal.       Results for orders placed or performed in visit on 11/20/18   AMB EXT LDL-C   Result Value Ref Range    LDL-C, External 78    AMB EXT HGBA1C   Result Value Ref Range    Hemoglobin A1c, External 6.6    LIPID PANEL   Result Value Ref Range    Cholesterol, total 104 100 - 199 mg/dL    Triglyceride 194 (H) 0 - 149 mg/dL    HDL Cholesterol 36 (L) >39 mg/dL    VLDL, calculated 39 5 - 40 mg/dL    LDL, calculated 29 0 - 99 mg/dL   METABOLIC PANEL, BASIC   Result Value Ref Range    Glucose 186 (H) 65 - 99 mg/dL    BUN 21 8 - 27 mg/dL    Creatinine 3.54 (H) 0.57 - 1.00 mg/dL    GFR est non-AA 13 (L) >59 mL/min/1.73    GFR est AA 15 (L) >59 mL/min/1.73    BUN/Creatinine ratio 6 (L) 12 - 28    Sodium 140 134 - 144 mmol/L    Potassium 3.9 3.5 - 5.2 mmol/L    Chloride 96 96 - 106 mmol/L    CO2 29 20 - 29 mmol/L    Calcium 9.3 8.7 - 10.3 mg/dL   MICROALBUMIN, UR, RAND W/ MICROALB/CREAT RATIO   Result Value Ref Range    Creatinine, urine CANCELED mg/dL    Microalbumin, urine CANCELED

## 2019-05-21 NOTE — PATIENT INSTRUCTIONS
Diabetes Foot Health: Care Instructions  Your Care Instructions    When you have diabetes, your feet need extra care and attention. Diabetes can damage the nerve endings and blood vessels in your feet, making you less likely to notice when your feet are injured. Diabetes also limits your body's ability to fight infection and get blood to areas that need it. If you get a minor foot injury, it could become an ulcer or a serious infection. With good foot care, you can prevent most of these problems. Caring for your feet can be quick and easy. Most of the care can be done when you are bathing or getting ready for bed. Follow-up care is a key part of your treatment and safety. Be sure to make and go to all appointments, and call your doctor if you are having problems. It's also a good idea to know your test results and keep a list of the medicines you take. How can you care for yourself at home? · Keep your blood sugar close to normal by watching what and how much you eat, monitoring blood sugar, taking medicines if prescribed, and getting regular exercise. · Do not smoke. Smoking affects blood flow and can make foot problems worse. If you need help quitting, talk to your doctor about stop-smoking programs and medicines. These can increase your chances of quitting for good. · Eat a diet that is low in fats. High fat intake can cause fat to build up in your blood vessels and decrease blood flow. · Inspect your feet daily for blisters, cuts, cracks, or sores. If you cannot see well, use a mirror or have someone help you. · Take care of your feet:  ? Wash your feet every day. Use warm (not hot) water. Check the water temperature with your wrists or other part of your body, not your feet. ? Dry your feet well. Pat them dry. Do not rub the skin on your feet too hard. Dry well between your toes. If the skin on your feet stays moist, bacteria or a fungus can grow, which can lead to infection. ?  Keep your skin soft. Use moisturizing skin cream to keep the skin on your feet soft and prevent calluses and cracks. But do not put the cream between your toes, and stop using any cream that causes a rash. ? Clean underneath your toenails carefully. Do not use a sharp object to clean underneath your toenails. Use the blunt end of a nail file or other rounded tool. ? Trim and file your toenails straight across to prevent ingrown toenails. Use a nail clipper, not scissors. Use an emery board to smooth the edges. · Change socks daily. Socks without seams are best, because seams often rub the feet. You can find socks for people with diabetes from specialty catalogs. · Look inside your shoes every day for things like gravel or torn linings, which could cause blisters or sores. · Buy shoes that fit well:  ? Look for shoes that have plenty of space around the toes. This helps prevent bunions and blisters. ? Try on shoes while wearing the kind of socks you will usually wear with the shoes. ? Avoid plastic shoes. They may rub your feet and cause blisters. Good shoes should be made of materials that are flexible and breathable, such as leather or cloth. ? Break in new shoes slowly by wearing them for no more than an hour a day for several days. Take extra time to check your feet for red areas, blisters, or other problems after you wear new shoes. · Do not go barefoot. Do not wear sandals, and do not wear shoes with very thin soles. Thin soles are easy to puncture. They also do not protect your feet from hot pavement or cold weather. · Have your doctor check your feet during each visit. If you have a foot problem, see your doctor. Do not try to treat an early foot problem at home. Home remedies or treatments that you can buy without a prescription (such as corn removers) can be harmful. · Always get early treatment for foot problems. A minor irritation can lead to a major problem if not properly cared for early.   When should you call for help? Call your doctor now or seek immediate medical care if:    · You have a foot sore, an ulcer or break in the skin that is not healing after 4 days, bleeding corns or calluses, or an ingrown toenail.     · You have blue or black areas, which can mean bruising or blood flow problems.     · You have peeling skin or tiny blisters between your toes or cracking or oozing of the skin.     · You have a fever for more than 24 hours and a foot sore.     · You have new numbness or tingling in your feet that does not go away after you move your feet or change positions.     · You have unexplained or unusual swelling of the foot or ankle.    Watch closely for changes in your health, and be sure to contact your doctor if:    · You cannot do proper foot care. Where can you learn more? Go to http://reji-yue.info/. Enter A739 in the search box to learn more about \"Diabetes Foot Health: Care Instructions. \"  Current as of: July 25, 2018  Content Version: 11.9  © 6249-9776 Healthwise, Incorporated. Care instructions adapted under license by [employer] (which disclaims liability or warranty for this information). If you have questions about a medical condition or this instruction, always ask your healthcare professional. [address] any warranty or liability for your use of this information.

## 2019-07-24 ENCOUNTER — TELEPHONE (OUTPATIENT)
Dept: FAMILY MEDICINE CLINIC | Age: 64
End: 2019-07-24

## 2019-08-23 LAB
EST. AVERAGE GLUCOSE BLD GHB EST-MCNC: 194 MG/DL
HBA1C MFR BLD: 8.4 % (ref 4.8–5.6)

## 2019-09-12 ENCOUNTER — OFFICE VISIT (OUTPATIENT)
Dept: FAMILY MEDICINE CLINIC | Age: 64
End: 2019-09-12

## 2019-09-12 VITALS
HEIGHT: 67 IN | SYSTOLIC BLOOD PRESSURE: 130 MMHG | TEMPERATURE: 97.7 F | BODY MASS INDEX: 32.02 KG/M2 | HEART RATE: 75 BPM | OXYGEN SATURATION: 97 % | WEIGHT: 204 LBS | DIASTOLIC BLOOD PRESSURE: 71 MMHG | RESPIRATION RATE: 16 BRPM

## 2019-09-12 DIAGNOSIS — Z95.5 S/P CORONARY ARTERY STENT PLACEMENT: ICD-10-CM

## 2019-09-12 DIAGNOSIS — R21 RASH: ICD-10-CM

## 2019-09-12 DIAGNOSIS — E66.9 OBESITY (BMI 30.0-34.9): ICD-10-CM

## 2019-09-12 DIAGNOSIS — E11.9 TYPE 2 DIABETES MELLITUS WITHOUT COMPLICATION, WITH LONG-TERM CURRENT USE OF INSULIN (HCC): Primary | ICD-10-CM

## 2019-09-12 DIAGNOSIS — Z99.2 ACUTE RENAL FAILURE ON DIALYSIS (HCC): ICD-10-CM

## 2019-09-12 DIAGNOSIS — I10 ESSENTIAL HYPERTENSION: ICD-10-CM

## 2019-09-12 DIAGNOSIS — N17.9 ACUTE RENAL FAILURE ON DIALYSIS (HCC): ICD-10-CM

## 2019-09-12 DIAGNOSIS — Z79.4 TYPE 2 DIABETES MELLITUS WITHOUT COMPLICATION, WITH LONG-TERM CURRENT USE OF INSULIN (HCC): Primary | ICD-10-CM

## 2019-09-12 DIAGNOSIS — E78.00 ELEVATED CHOLESTEROL: ICD-10-CM

## 2019-09-12 PROBLEM — E11.21 TYPE 2 DIABETES WITH NEPHROPATHY (HCC): Status: ACTIVE | Noted: 2019-09-12

## 2019-09-12 RX ORDER — ATORVASTATIN CALCIUM 40 MG/1
TABLET, FILM COATED ORAL
Qty: 90 TAB | Refills: 1 | Status: SHIPPED | OUTPATIENT
Start: 2019-09-12 | End: 2020-08-25 | Stop reason: SDUPTHER

## 2019-09-12 RX ORDER — PEN NEEDLE, DIABETIC 30 GX3/16"
NEEDLE, DISPOSABLE MISCELLANEOUS
Qty: 1 PACKAGE | Refills: 11 | Status: SHIPPED | OUTPATIENT
Start: 2019-09-12 | End: 2020-08-25

## 2019-09-12 RX ORDER — NYSTATIN AND TRIAMCINOLONE ACETONIDE 100000; 1 [USP'U]/G; MG/G
CREAM TOPICAL
Qty: 30 G | Refills: 1 | Status: SHIPPED | OUTPATIENT
Start: 2019-09-12 | End: 2020-08-25 | Stop reason: SDUPTHER

## 2019-09-12 RX ORDER — CLOPIDOGREL BISULFATE 75 MG/1
TABLET ORAL
Qty: 90 TAB | Refills: 1 | Status: SHIPPED | OUTPATIENT
Start: 2019-09-12 | End: 2020-08-25

## 2019-09-12 RX ORDER — INSULIN DETEMIR 100 [IU]/ML
INJECTION, SOLUTION SUBCUTANEOUS
Qty: 5 PEN | Refills: 8 | Status: SHIPPED | OUTPATIENT
Start: 2019-09-12 | End: 2020-08-25 | Stop reason: SDUPTHER

## 2019-09-12 RX ORDER — METOPROLOL SUCCINATE 50 MG/1
50 TABLET, EXTENDED RELEASE ORAL DAILY
Qty: 90 TAB | Refills: 1 | Status: SHIPPED | OUTPATIENT
Start: 2019-09-12 | End: 2020-08-25 | Stop reason: SDUPTHER

## 2019-09-12 NOTE — PROGRESS NOTES
Patient stated name &     Chief Complaint   Patient presents with    Medication Refill     Clopidogrel, Metoprolol, Atorvastatin, Levemir & Needles, Nystatin Cream    Blood sugar problem     A1C  - 8.4        Health Maintenance Due   Topic    Pneumococcal 0-64 years (1 of 1 - PPSV23)    EYE EXAM RETINAL OR DILATED     DTaP/Tdap/Td series (1 - Tdap)    PAP AKA CERVICAL CYTOLOGY     Shingrix Vaccine Age 50> (1 of 2)    FOBT Q 1 YEAR AGE 54-65     MEDICARE YEARLY EXAM     FOOT EXAM Q1     Influenza Age 5 to Adult        Wt Readings from Last 3 Encounters:   19 204 lb (92.5 kg)   19 202 lb (91.6 kg)   18 196 lb (88.9 kg)     Temp Readings from Last 3 Encounters:   19 97.7 °F (36.5 °C) (Oral)   19 98.4 °F (36.9 °C) (Oral)   18 97.8 °F (36.6 °C) (Oral)     BP Readings from Last 3 Encounters:   19 130/71   19 129/75   18 117/75     Pulse Readings from Last 3 Encounters:   19 75   19 73   18 73         Learning Assessment:  :     Learning Assessment 2018   PRIMARY LEARNER Patient   HIGHEST LEVEL OF EDUCATION - PRIMARY LEARNER  > 4 YEARS OF COLLEGE   BARRIERS PRIMARY LEARNER NONE   CO-LEARNER CAREGIVER No   PRIMARY LANGUAGE ENGLISH    NEED No   LEARNER PREFERENCE PRIMARY LISTENING   LEARNING SPECIAL TOPICS no   ANSWERED BY self   RELATIONSHIP SELF   ASSESSMENT COMMENT no       Depression Screening:  :     3 most recent PHQ Screens 2019   Little interest or pleasure in doing things Not at all   Feeling down, depressed, irritable, or hopeless Not at all   Total Score PHQ 2 0       Fall Risk Assessment:  :     No flowsheet data found. Abuse Screening:  :     No flowsheet data found. Coordination of Care Questionnaire:  :     1) Have you been to an emergency room, urgent care clinic since your last visit? No    Hospitalized since your last visit?  No             2) Have you seen or consulted any other health care providers outside of [address] [place] [address] since your last visit? No  (Include any pap smears or colon screenings in this section.)    Patient is accompanied by self I have received verbal consent from Devora Foy to discuss any/all medical information while they are present in the room.

## 2019-09-12 NOTE — PATIENT INSTRUCTIONS
Body Mass Index: Care Instructions  Your Care Instructions    Body mass index (BMI) can help you see if your weight is raising your risk for health problems. It uses a formula to compare how much you weigh with how tall you are. · A BMI lower than 18.5 is considered underweight. · A BMI between 18.5 and 24.9 is considered healthy. · A BMI between 25 and 29.9 is considered overweight. A BMI of 30 or higher is considered obese. If your BMI is in the normal range, it means that you have a lower risk for weight-related health problems. If your BMI is in the overweight or obese range, you may be at increased risk for weight-related health problems, such as high blood pressure, heart disease, stroke, arthritis or joint pain, and diabetes. If your BMI is in the underweight range, you may be at increased risk for health problems such as fatigue, lower protection (immunity) against illness, muscle loss, bone loss, hair loss, and hormone problems. BMI is just one measure of your risk for weight-related health problems. You may be at higher risk for health problems if you are not active, you eat an unhealthy diet, or you drink too much alcohol or use tobacco products. Follow-up care is a key part of your treatment and safety. Be sure to make and go to all appointments, and call your doctor if you are having problems. It's also a good idea to know your test results and keep a list of the medicines you take. How can you care for yourself at home? · Practice healthy eating habits. This includes eating plenty of fruits, vegetables, whole grains, lean protein, and low-fat dairy. · If your doctor recommends it, get more exercise. Walking is a good choice. Bit by bit, increase the amount you walk every day. Try for at least 30 minutes on most days of the week. · Do not smoke. Smoking can increase your risk for health problems. If you need help quitting, talk to your doctor about stop-smoking programs and medicines. These can increase your chances of quitting for good. · Limit alcohol to 2 drinks a day for men and 1 drink a day for women. Too much alcohol can cause health problems. If you have a BMI higher than 25  · Your doctor may do other tests to check your risk for weight-related health problems. This may include measuring the distance around your waist. A waist measurement of more than 40 inches in men or 35 inches in women can increase the risk of weight-related health problems. · Talk with your doctor about steps you can take to stay healthy or improve your health. You may need to make lifestyle changes to lose weight and stay healthy, such as changing your diet and getting regular exercise. If you have a BMI lower than 18.5  · Your doctor may do other tests to check your risk for health problems. · Talk with your doctor about steps you can take to stay healthy or improve your health. You may need to make lifestyle changes to gain or maintain weight and stay healthy, such as getting more healthy foods in your diet and doing exercises to build muscle. Where can you learn more? Go to http://reji-yue.info/. Enter S176 in the search box to learn more about \"Body Mass Index: Care Instructions. \"  Current as of: October 13, 2016  Content Version: 11.4  © 9266-4303 Healthwise, Incorporated. Care instructions adapted under license by [employer] (which disclaims liability or warranty for this information). If you have questions about a medical condition or this instruction, always ask your healthcare professional. [address] any warranty or liability for your use of this information.

## 2019-09-12 NOTE — PROGRESS NOTES
Assessment/Plan:     Diagnoses and all orders for this visit:    1. Type 2 diabetes mellitus without complication, with long-term current use of insulin (HCC)  -     LEVEMIR FLEXTOUCH U-100 INSULN 100 unit/mL (3 mL) inpn; INJ 10 UNITS UNDER THE SKIN every day, increase by 3 units every 2 days till fasting BG is between   -     Insulin Needles, Disposable, 31 gauge x 5/16\" ndle; Change needle after each injection  -     HEMOGLOBIN A1C WITH EAG; Future  -     CBC WITH AUTOMATED DIFF; Future  - Worsening, instructions given to increase levemir. Labs in 3 months. Refills today     Your goal with basal insulin titration is to obtain a blood glucose between 70 - 140. Raise your basal insulin dose by 3 units if your fasting blood glucose is greater than 140 for 2 days in a row. Each morning when you wake up, check your blood glucose with your home glucose monitor before you have anything to eat or drink and this gives you your fasting blood glucose result. your dosage adjustment for your Levemir/Lantus insulin is only based on the fasting blood glucose. Thus if you are at a 10 unit dose of Levemir/Lantus and your fasting blood glucose is 155 and the next day it is 160 you should raise your Levemir/Lantus dose to 13 units. Keep raising your Levemir/Lantus dose daily until you achieve a consistent fasting blood glucose < 140.  just remember only to increase it if the fasting glucose is >140 for two days in a row. So if it were 160 on day 1 and 135 on day 2 you would not raise it because it wasnt two days in a row! When your blood glucose gets between 70 - 140, stay at the same basal insulin dose that got you to this point. In other words, if you end up raising your daily basal insulin dose to 20 units to get to a fasting blood glucose between 70 - 140, stay at that 20 units dose as long as it allows you to maintain that 70 - 140 fasting blood glucose level.     If your fasting blood glucose goes below 70, lower your basal insulin dosage by 2 units. In other words, if you wake up one morning and find your fasting blood glucose is 65, lower the basal insulin dose by 2 units (i.e. if you were taking 20 units of Levemir/Lantus the day before, now you reduce your dose to 18 units). Another examples of how to titrate your Levemir/Lantus dose: You take your first 10 unit dose of Levemir/Lantus and your fasting blood glucose the next day is 154. You dont increase yet. You check it again tomorrow. If it is greater than 140 then you should now raise your Levemir/Lantus dose to 13 units, which is 3 units more than the previous day. You keep checking you morning blood sugar everyday but dont make any additional increases unless it greater than 140 for 2 days in a row. 2. Acute renal failure on dialysis ([facility] [address]   -stable    3. S/P coronary artery stent placement  -     clopidogrel (PLAVIX) 75 mg tab; TK 1 T PO D  - Stable, continue current therapy    4. Essential hypertension  -     metoprolol succinate (TOPROL-XL) 50 mg XL tablet; Take 1 Tab by mouth daily.  - Stable, continue current therapy    5. Elevated cholesterol  -     atorvastatin (LIPITOR) 40 mg tablet; TAKE 1 TABLET BY MOUTH DAILY  -     LIPID PANEL; Future  - Presumed stable, refill today, continue current therapy, labs ordered    6. Rash  -     nystatin-triamcinolone (MYCOLOG II) topical cream; Apply  to affected area two (2) times daily as needed for Skin Irritation or Itching.  - Worsening, refill today, RTC if symptoms do not improve    7. Obesity (BMI 30.0-34. 9)  Discussed the patient's BMI with her. The BMI follow up plan is as follows:     dietary management education, guidance, and counseling  encourage exercise  monitor weight  prescribed dietary intake      Discussed expected course/resolution/complications of diagnosis in detail with patient. Medication risks/benefits/costs/interactions/alternatives discussed with patient.     Pt was given after visit summary which includes diagnoses, current medications & vitals. Pt expressed understanding with the diagnosis and plan        Subjective:      Trini Herr is a 62 y.o. female who presents for had concerns including Medication Refill (Clopidogrel, Metoprolol, Atorvastatin, Levemir & Needles, Nystatin Cream) and Blood sugar problem (A1C  - 8.4). Here today for diabetes follow up. Is on dialysis 3 days a week. A1c was 8.4. States went to [facility] for 3 months and went crazy with her eating. Takes 10 units of levemir at night. Fasting blood sugars 200, 180, highest was 215. This morning was 210. States diet is better since being back home. Does not exercise. HTN  /71 today. Takes metoprolol 50mg daily as directed with no reported side effects. Does not check bp at home but at dialysis. Denies CP, SOB, palpitations or leg swelling. Elevated cholesterol  Takes atorvastatin as directed with no reported side effects. Current Outpatient Medications   Medication Sig Dispense Refill    clopidogrel (PLAVIX) 75 mg tab TK 1 T PO D 90 Tab 1    metoprolol succinate (TOPROL-XL) 50 mg XL tablet Take 1 Tab by mouth daily. 90 Tab 1    atorvastatin (LIPITOR) 40 mg tablet TAKE 1 TABLET BY MOUTH DAILY 90 Tab 1    LEVEMIR FLEXTOUCH U-100 INSULN 100 unit/mL (3 mL) inpn INJ 10 UNITS UNDER THE SKIN every day, increase by 3 units every 2 days till fasting BG is between  5 Pen 8    nystatin-triamcinolone (MYCOLOG II) topical cream Apply  to affected area two (2) times daily as needed for Skin Irritation or Itching. 30 g 1    Insulin Needles, Disposable, 31 gauge x 5/16\" ndle Change needle after each injection 1 Package 11    Vit B Cplx #14-IT-U-Biot-Zinc (DIALYVITE) [id number] mg-mg-mcg-mg tab Take  by mouth daily as needed.       lidocaine-prilocaine (EMLA) topical cream       nitroglycerin (NITROSTAT) 0.4 mg SL tablet   0    loratadine (CLARITIN) 10 mg tablet Take 1 Tab by mouth every other day. 1 Tab 0    aspirin delayed-release 81 mg tablet Take 81 mg by mouth daily. Allergies   Allergen Reactions    Penicillins Swelling    Lisinopril Swelling     Past Medical History:   Diagnosis Date    Acute kidney failure (HCC)     Diabetes ([address]     Hypertension     Obesity (BMI 30.0-34.9) 6/29/2017     Past Surgical History:   Procedure Laterality Date    HX CYST INCISION AND DRAINAGE      HX GYN      c section     Family History   Problem Relation Age of Onset    Alzheimer Mother     Lupus Father     Heart Attack Father     Heart Failure Father         CHF    Hypertension Father     Kidney Disease Other         uncle, niece, cousin   Jodie Aguilar Breast Cancer Sister     Diabetes Paternal Aunt         Type 2     Social History     Socioeconomic History    Marital status:      Spouse name: Not on file    Number of children: Not on file    Years of education: Not on file    Highest education level: Not on file   Occupational History    Not on file   Social Needs    Financial resource strain: Not on file    Food insecurity:     Worry: Not on file     Inability: Not on file    Transportation needs:     Medical: Not on file     Non-medical: Not on file   Tobacco Use    Smoking status: Never Smoker    Smokeless tobacco: Never Used   Substance and Sexual Activity    Alcohol use: Yes     Comment: wine occas.     Drug use: No    Sexual activity: Not on file   Lifestyle    Physical activity:     Days per week: Not on file     Minutes per session: Not on file    Stress: Not on file   Relationships    Social connections:     Talks on phone: Not on file     Gets together: Not on file     Attends Latter day service: Not on file     Active member of club or organization: Not on file     Attends meetings of clubs or organizations: Not on file     Relationship status: Not on file    Intimate partner violence:     Fear of current or ex partner: Not on file     Emotionally abused: Not on file     Physically abused: Not on file     Forced sexual activity: Not on file   Other Topics Concern    Not on file   Social History Narrative    Not on file       HPI      ROS:   Review of Systems   Constitutional: Negative for chills, fever and malaise/fatigue. Respiratory: Negative for cough and shortness of breath. Cardiovascular: Negative for chest pain and palpitations. Skin: Positive for rash. Neurological: Negative for dizziness and headaches. Psychiatric/Behavioral: Negative for depression. Objective:     Visit Vitals  /71   Pulse 75   Temp 97.7 °F (36.5 °C) (Oral)   Resp 16   Ht 5' 7.25\" (1.708 m)   Wt 204 lb (92.5 kg)   SpO2 97%   BMI 31.71 kg/m²         Vitals and Nurse Documentation reviewed. Physical Exam   Constitutional: She is oriented to person, place, and time and well-developed, well-nourished, and in no distress. Vital signs are normal. No distress. HENT:   Head: Normocephalic and atraumatic. Cardiovascular: Normal rate, regular rhythm and normal heart sounds. Exam reveals no gallop and no friction rub. No murmur heard. Pulmonary/Chest: Effort normal and breath sounds normal. No respiratory distress. She has no wheezes. She has no rales. Neurological: She is alert and oriented to person, place, and time. Skin: Skin is warm, dry and intact. She is not diaphoretic. No cyanosis. No pallor. Psychiatric: Affect normal. Her mood appears not anxious. She is not agitated. She does not exhibit a depressed mood. She expresses no homicidal and no suicidal ideation. Results for orders placed or performed in visit on 05/21/19   HEMOGLOBIN A1C WITH EAG   Result Value Ref Range    Hemoglobin A1c 8.4 (H) 4.8 - 5.6 %    Estimated average glucose 194 mg/dL             An After Visit Summary was printed and given to the patient.

## 2019-12-12 DIAGNOSIS — E78.00 ELEVATED CHOLESTEROL: ICD-10-CM

## 2019-12-12 DIAGNOSIS — Z79.4 TYPE 2 DIABETES MELLITUS WITHOUT COMPLICATION, WITH LONG-TERM CURRENT USE OF INSULIN (HCC): ICD-10-CM

## 2019-12-12 DIAGNOSIS — E11.9 TYPE 2 DIABETES MELLITUS WITHOUT COMPLICATION, WITH LONG-TERM CURRENT USE OF INSULIN (HCC): ICD-10-CM

## 2019-12-30 ENCOUNTER — HOSPITAL ENCOUNTER (OUTPATIENT)
Dept: MAMMOGRAPHY | Age: 64
Discharge: HOME | End: 2019-12-30
Attending: OBSTETRICS & GYNECOLOGY
Payer: COMMERCIAL

## 2019-12-30 DIAGNOSIS — Z12.31 VISIT FOR SCREENING MAMMOGRAM: ICD-10-CM

## 2019-12-30 PROCEDURE — 77067 SCR MAMMO BI INCL CAD: CPT

## 2020-05-18 ENCOUNTER — TELEPHONE (OUTPATIENT)
Dept: FAMILY MEDICINE CLINIC | Age: 65
End: 2020-05-18

## 2020-05-18 NOTE — TELEPHONE ENCOUNTER
----- Message from Fabian Vasquez sent at 2020  3:02 PM EDT -----  Regarding: Zimmerman/Telephone  Contact: [phone]  Caller's first and last name: N/A  Reason for call: Pt stated lab paperwork for A1C to be done , needs new paperwork.   Callback required yes/no and why: Yes, to inform   Best contact number(s): [phone]  Details to clarify the request: N/A

## 2020-08-25 ENCOUNTER — OFFICE VISIT (OUTPATIENT)
Dept: FAMILY MEDICINE CLINIC | Age: 65
End: 2020-08-25
Payer: MEDICARE

## 2020-08-25 VITALS
DIASTOLIC BLOOD PRESSURE: 78 MMHG | TEMPERATURE: 98.2 F | BODY MASS INDEX: 32.52 KG/M2 | OXYGEN SATURATION: 100 % | HEART RATE: 76 BPM | HEIGHT: 67 IN | RESPIRATION RATE: 16 BRPM | WEIGHT: 207.2 LBS | SYSTOLIC BLOOD PRESSURE: 150 MMHG

## 2020-08-25 DIAGNOSIS — E78.5 HYPERLIPIDEMIA, UNSPECIFIED HYPERLIPIDEMIA TYPE: ICD-10-CM

## 2020-08-25 DIAGNOSIS — E78.00 ELEVATED CHOLESTEROL: ICD-10-CM

## 2020-08-25 DIAGNOSIS — I10 ESSENTIAL HYPERTENSION: ICD-10-CM

## 2020-08-25 DIAGNOSIS — R21 RASH: ICD-10-CM

## 2020-08-25 DIAGNOSIS — E78.00 HYPERCHOLESTEREMIA: ICD-10-CM

## 2020-08-25 DIAGNOSIS — Z79.4 TYPE 2 DIABETES MELLITUS WITHOUT COMPLICATION, WITH LONG-TERM CURRENT USE OF INSULIN (HCC): Primary | ICD-10-CM

## 2020-08-25 DIAGNOSIS — Z00.00 ANNUAL PHYSICAL EXAM: ICD-10-CM

## 2020-08-25 DIAGNOSIS — E11.9 TYPE 2 DIABETES MELLITUS WITHOUT COMPLICATION, WITH LONG-TERM CURRENT USE OF INSULIN (HCC): Primary | ICD-10-CM

## 2020-08-25 PROCEDURE — G8510 SCR DEP NEG, NO PLAN REQD: HCPCS | Performed by: FAMILY MEDICINE

## 2020-08-25 PROCEDURE — G8754 DIAS BP LESS 90: HCPCS | Performed by: FAMILY MEDICINE

## 2020-08-25 PROCEDURE — 99213 OFFICE O/P EST LOW 20 MIN: CPT | Performed by: FAMILY MEDICINE

## 2020-08-25 PROCEDURE — G8753 SYS BP > OR = 140: HCPCS | Performed by: FAMILY MEDICINE

## 2020-08-25 PROCEDURE — G8417 CALC BMI ABV UP PARAM F/U: HCPCS | Performed by: FAMILY MEDICINE

## 2020-08-25 PROCEDURE — G0439 PPPS, SUBSEQ VISIT: HCPCS | Performed by: FAMILY MEDICINE

## 2020-08-25 PROCEDURE — 3017F COLORECTAL CA SCREEN DOC REV: CPT | Performed by: FAMILY MEDICINE

## 2020-08-25 PROCEDURE — G9899 SCRN MAM PERF RSLTS DOC: HCPCS | Performed by: FAMILY MEDICINE

## 2020-08-25 PROCEDURE — G8427 DOCREV CUR MEDS BY ELIG CLIN: HCPCS | Performed by: FAMILY MEDICINE

## 2020-08-25 PROCEDURE — 2022F DILAT RTA XM EVC RTNOPTHY: CPT | Performed by: FAMILY MEDICINE

## 2020-08-25 PROCEDURE — 3046F HEMOGLOBIN A1C LEVEL >9.0%: CPT | Performed by: FAMILY MEDICINE

## 2020-08-25 RX ORDER — METOPROLOL SUCCINATE 50 MG/1
50 TABLET, EXTENDED RELEASE ORAL DAILY
Qty: 90 TAB | Refills: 1 | Status: SHIPPED | OUTPATIENT
Start: 2020-08-25

## 2020-08-25 RX ORDER — PEN NEEDLE, DIABETIC 31 GX3/16"
NEEDLE, DISPOSABLE MISCELLANEOUS
Qty: 90 PEN NEEDLE | Refills: 3 | Status: SHIPPED | OUTPATIENT
Start: 2020-08-25

## 2020-08-25 RX ORDER — NYSTATIN AND TRIAMCINOLONE ACETONIDE 100000; 1 [USP'U]/G; MG/G
CREAM TOPICAL
Qty: 30 G | Refills: 1 | Status: SHIPPED | OUTPATIENT
Start: 2020-08-25

## 2020-08-25 RX ORDER — SEVELAMER CARBONATE 800 MG/1
TABLET, FILM COATED ORAL
COMMUNITY
Start: 2020-06-28

## 2020-08-25 RX ORDER — INSULIN DETEMIR 100 [IU]/ML
INJECTION, SOLUTION SUBCUTANEOUS
Qty: 5 PEN | Refills: 8 | Status: SHIPPED | OUTPATIENT
Start: 2020-08-25 | End: 2020-11-13

## 2020-08-25 RX ORDER — ATORVASTATIN CALCIUM 40 MG/1
TABLET, FILM COATED ORAL
Qty: 90 TAB | Refills: 1 | Status: SHIPPED | OUTPATIENT
Start: 2020-08-25 | End: 2021-04-14 | Stop reason: SDUPTHER

## 2020-08-25 NOTE — PROGRESS NOTES
Patient stated name &     Chief Complaint   Patient presents with    Complete Physical     No PAP    Waist measurements - 49.25\"        Health Maintenance Due   Topic    Pneumococcal 0-64 years (1 of 1 - PPSV23)    Eye Exam Retinal or Dilated     DTaP/Tdap/Td series (1 - Tdap)    PAP AKA CERVICAL CYTOLOGY     Shingrix Vaccine Age 50> (1 of 2)    FOBT Q1Y Age 54-65     Medicare Yearly Exam     Foot Exam Q1     MICROALBUMIN Q1     Lipid Screen     A1C test (Diabetic or Prediabetic)        Wt Readings from Last 3 Encounters:   20 207 lb 3.2 oz (94 kg)   19 204 lb (92.5 kg)   19 202 lb (91.6 kg)     Temp Readings from Last 3 Encounters:   20 98.2 °F (36.8 °C) (Oral)   19 97.7 °F (36.5 °C) (Oral)   19 98.4 °F (36.9 °C) (Oral)     BP Readings from Last 3 Encounters:   20 (!) 194/94   19 130/71   19 129/75     Pulse Readings from Last 3 Encounters:   20 76   19 75   19 73         Learning Assessment:  :     Learning Assessment 2018   PRIMARY LEARNER Patient   HIGHEST LEVEL OF EDUCATION - PRIMARY LEARNER  > 4 YEARS OF COLLEGE   BARRIERS PRIMARY LEARNER NONE   CO-LEARNER CAREGIVER No   PRIMARY LANGUAGE ENGLISH    NEED No   LEARNER PREFERENCE PRIMARY LISTENING   LEARNING SPECIAL TOPICS no   ANSWERED BY self   RELATIONSHIP SELF   ASSESSMENT COMMENT no       Depression Screening:  :     3 most recent PHQ Screens 2020   Little interest or pleasure in doing things Not at all   Feeling down, depressed, irritable, or hopeless Not at all   Total Score PHQ 2 0       Fall Risk Assessment:  :     No flowsheet data found. Abuse Screening:  :     Abuse Screening Questionnaire 2019   Do you ever feel afraid of your partner? N   Are you in a relationship with someone who physically or mentally threatens you? N   Is it safe for you to go home?  Y       Coordination of Care Questionnaire:  :     1) Have you been to an emergency room, urgent care clinic since your last visit? No    Hospitalized since your last visit? No             2) Have you seen or consulted any other health care providers outside of [address] [place] [address] since your last visit? No  (Include any pap smears or colon screenings in this section.)  No    Patient is accompanied by self I have received verbal consent from Kerry Hyatt to discuss any/all medical information while they are present in the room.

## 2020-08-25 NOTE — PATIENT INSTRUCTIONS
Health is fragile, immunizations reviewed and screening discussed and done as per patient's desires. Exercise includes 4 components:  Stretching, core muscle, cardiovascular and balance techniques. Good posture is protective to your back - stand straight as much as possible. Exercise daily of 40 minutes of active exercise encouraged, a balanced diet with portions of fruits, vegetables and salads recommended. Watch sodium intake if high blood pressure is an issue. Labs ordered. If results are not mailed or phoned to you within 2 weeks, please call us. Medicare Wellness Visit, Female The best way to live healthy is to have a lifestyle where you eat a well-balanced diet, exercise regularly, limit alcohol use, and quit all forms of tobacco/nicotine, if applicable. Regular preventive services are another way to keep healthy. Preventive services (vaccines, screening tests, monitoring & exams) can help personalize your care plan, which helps you manage your own care. Screening tests can find health problems at the earliest stages, when they are easiest to treat. [facility] follows the current, evidence-based guidelines published by the [facility] [employer] Bl[facility] ([facility]) when recommending preventive services for our patients. Because we follow these guidelines, sometimes recommendations change over time as research supports it. (For example, mammograms used to be recommended annually. Even though Medicare will still pay for an annual mammogram, the newer guidelines recommend a mammogram every two years for women of average risk). Of course, you and your doctor may decide to screen more often for some diseases, based on your risk and your co-morbidities (chronic disease you are already diagnosed with). Preventive services for you include: - Medicare offers their members a free annual wellness visit, which is time for you and your primary care provider to discuss and plan for your preventive service needs. Take advantage of this benefit every year! 
-All adults over the age of 72 should receive the recommended pneumonia vaccines. Current USPSTF guidelines recommend a series of two vaccines for the best pneumonia protection.  
-All adults should have a flu vaccine yearly and a tetanus vaccine every 10 years.  
-All adults age 48 and older should receive the shingles vaccines (series of two vaccines). -All adults age 38-68 who are overweight should have a diabetes screening test once every three years.  
-All adults born between [birth year] and 1965 should be screened once for Hepatitis C. 
-Other screening tests and preventive services for persons with diabetes include: an eye exam to screen for diabetic retinopathy, a kidney function test, a foot exam, and stricter control over your cholesterol.  
-Cardiovascular screening for adults with routine risk involves an electrocardiogram (ECG) at intervals determined by your doctor.  
-Colorectal cancer screenings should be done for adults age 54-65 with no increased risk factors for colorectal cancer. There are a number of acceptable methods of screening for this type of cancer. Each test has its own benefits and drawbacks. Discuss with your doctor what is most appropriate for you during your annual wellness visit. The different tests include: colonoscopy (considered the best screening method), a fecal occult blood test, a fecal DNA test, and sigmoidoscopy. 
 
-A bone mass density test is recommended when a woman turns 65 to screen for osteoporosis. This test is only recommended one time, as a screening. Some providers will use this same test as a disease monitoring tool if you already have osteoporosis.  
-Breast cancer screenings are recommended every other year for women of normal risk, age 54-69. 
-Cervical cancer screenings for women over age 72 are only recommended with certain risk factors. Here is a list of your current Health Maintenance items (your personalized list of preventive services) with a due date: 
Health Maintenance Due Topic Date Due  Pneumococcal Vaccine (1 of 1 - PPSV23) 08[birth year]14/1962 [address] [place] [address] Eye Exam  08/14/1966  
 DTaP/Tdap/Td  (1 - Tdap) 08/14/1977  Pap Test  08/14/1977  Shingles Vaccine (1 of 2) 08/14/2006  Colon Cancer Stool Test  06/28/2018 [address] [place] [address] Annual Well Visit  11/20/2018 [address] [place] [address] Diabetic Foot Care  06/29/2019  Albumin Urine Test  04/25/2020  Cholesterol Test   04/25/2020  Hemoglobin A1C    08/22/2020

## 2020-08-25 NOTE — PROGRESS NOTES
[address]  [address], [address]  [phone]             Date of visit: 8/25/2020       This is a Subsequent Medicare Annual Wellness Visit (AWV), (Performed more than 12 months after effective date of Medicare Part B enrollment and 12 months after last preventive visit, Once in a lifetime)    I have reviewed the patient's medical history in detail and updated the computerized patient record. Eusebio Arguello is a 60 y.o. female   History obtained from: patient    Concerns today   (Patient understands that medical problems addressed today may incur additional cost as this is a preventive visit)    History     Patient Active Problem List   Diagnosis Code    ARF (acute renal failure) ([facility] N17.9    HTN (hypertension) I10    DM type 2 (diabetes mellitus, type 2) ([facility] E11.9    Anemia D64.9    Obesity (BMI 30.0-34. 9) E66.9    Acute kidney failure (HCC) N17.9    Type 2 diabetes with nephropathy (HCC) E11.21     Past Medical History:   Diagnosis Date    Acute kidney failure (HCC)     CKD (chronic kidney disease) stage V requiring chronic dialysis ([facility] 2020 Nephrologist    Diabetes ([facility]     Hypertension     Obesity (BMI 30.0-34.9) 6/29/2017      Past Surgical History:   Procedure Laterality Date    HX CYST INCISION AND DRAINAGE      HX GYN      c section     Allergies   Allergen Reactions    Penicillins Swelling    Lisinopril Swelling     Current Outpatient Medications   Medication Sig Dispense Refill    insulin detemir (LEVEMIR FLEXPEN SC) 13 Units, SQ, bedtime, 0 Refills      sevelamer carbonate (RENVELA) 800 mg tab tab       Insulin Needles, Disposable, (Lalita Pen Needle) 32 gauge x 5/32\" ndle Use daily 90 Pen Needle 3    metoprolol succinate (TOPROL-XL) 50 mg XL tablet Take 1 Tab by mouth daily.  90 Tab 1    atorvastatin (LIPITOR) 40 mg tablet TAKE 1 TABLET BY MOUTH DAILY 90 Tab 1    Levemir FlexTouch U-100 Insuln 100 unit/mL (3 mL) inpn INJ 10 UNITS UNDER THE SKIN every day, increase by 3 units every 2 days till fasting BG is between  5 Pen 8    nystatin-triamcinolone (MYCOLOG II) topical cream Apply  to affected area two (2) times daily as needed for Skin Irritation or Itching. 30 g 1    Vit B Cplx #16-HR-I-Biot-Zinc (DIALYVITE) [phone] mg-mg-mcg-mg tab Take  by mouth daily as needed.  lidocaine-prilocaine (EMLA) topical cream       nitroglycerin (NITROSTAT) 0.4 mg SL tablet   0    loratadine (CLARITIN) 10 mg tablet Take 1 Tab by mouth every other day. 1 Tab 0    aspirin delayed-release 81 mg tablet Take 81 mg by mouth daily. Family History   Problem Relation Age of Onset    Alzheimer Mother     Lupus Father     Heart Attack Father     Heart Failure Father         CHF    Hypertension Father     Kidney Disease Other         uncle, niece, cousin    Breast Cancer Sister     Diabetes Paternal Aunt         Type 2     Social History     Tobacco Use    Smoking status: Never Smoker    Smokeless tobacco: Never Used   Substance Use Topics    Alcohol use: Yes     Comment: wine occas. Health Risk Assessment     Demographics   female  60 y.o. General Health Questions   -During the past 4 weeks:   -how would you rate your health in general? Fair   -Have you noticed any hearing difficulties? no   -has your physical and emotional health limited your social activities with family or friends? no    Emotional Health Questions   -Do you have a history of depression, anxiety, or emotional problems? no  -Over the past 2 weeks, have you felt down, depressed or hopeless? no      Health Habits   Please describe your diet habits: regular    Do you exercise regularly?  no    Activities of Daily Living and Functional Status   -Do you need help with eating, walking, dressing, bathing, toileting, the phone, transportation, shopping, preparing meals, housework, laundry, medications or managing money? no  -In the past four weeks, was someone available to help you if you needed and wanted help with anything? yes  -Are you confident are you that you can control and manage most of your health problems? yes  -How often do you have trouble taking your medications as prescribed? No trouble    Fall Risk and Home Safety   Have you fallen 2 or more times in the past year? no  Does your home have rugs in the hallway, lack grab bars in the bathroom, lack handrails on the stairs or have poor lighting? no  Do you have smoke detectors and check them regularly? yes  Do you have difficulties driving a car? no  Do you always fasten your seat belt when you are in a car? yes    Review of Systems   Review of Systems -   General ROS: negative for - chills or fever  Respiratory ROS: negative for - cough, shortness of breath or wheezing  Cardiovascular ROS: negative for - chest pain or palpitations  Gastrointestinal ROS: negative for - abdominal pain, constipation, diarrhea, nausea, vomiting  Neurological ROS: negative for - dizziness or headaches  Dermatological ROS: negative for - rash or skin lesion changes      Physical Examination     Vitals:    08/25/20 0938 08/25/20 0946   BP: (!) 194/94 150/78   Pulse: 76    Resp: 16    Temp: 98.2 °F (36.8 °C)    TempSrc: Oral    SpO2: 100%    Weight: 207 lb 3.2 oz (94 kg)    Height: 5' 7\" (1.702 m)      Body mass index is 32.45 kg/m². No exam data present. sc      General: Patient alert and oriented and in NAD  HEENT: PER/EOMI, no conjunctival pallor or scleral icterus. No thyromegaly or cervical lymphadenopathy. TM's without abnormality. Heart: Regular rate and rhythm, No murmurs, rubs or gallops.    Lungs: Clear to auscultation bilaterally, no wheezing, rales or rhonchi  Abd: +BS, non-tender, non-distended  Ext: No edema  Skin: No rashes or lesions noted on exposed skin  Neuro: A&Ox3    Evaluation of Cognitive Function   Mood/affect:  stable  Orientation: A&Ox3  Appearance: Appropriate for age and sex        Preventive Services (Preventive care checklist to be included in patient instructions)  Discussed today Done Previously Not Needed    2020   Pneumococcal vaccines   2020   Flu vaccine      Hepatitis B vaccine (if at risk)   2020   Shingles vaccine   2020   TDAP vaccine      Digital rectal exam      PSA    Scheduled in 2020  Colorectal cancer screening      Low-dose CT for lung cancer screening      Bone density test      Glaucoma screening      Cholesterol test      Diabetes screening test       Diabetes self-management class      Nutritionist referral for diabetes or renal disease   Gyn seeing in 2020 -- Mammogram, PAP and BMD  Pt's immunization records at [facility]. She will getr them for us. Discussion of Advance Directive   Discussed with Elvis Deleon her ability to prepare and advance directive in the case that an injury or illness causes her to be unable to make health care decisions:     Assessment/Plan   Z00.00    ICD-10-CM ICD-9-CM    1. Type 2 diabetes mellitus without complication, with long-term current use of insulin (HCC)  E11.9 250.00 HEMOGLOBIN A1C WITH EAG    Z79.4 V58.67 Insulin Needles, Disposable, (Lalita Pen Needle) 32 gauge x 5/32\" ndle      Levemir FlexTouch U-100 Insuln 100 unit/mL (3 mL) inpn   2. Hyperlipidemia, unspecified hyperlipidemia type  E78.5 272.4 LIPID PANEL   3. Hypercholesteremia  E78.00 272.0 HEPATIC FUNCTION PANEL   4. Essential hypertension  I10 401.9 metoprolol succinate (TOPROL-XL) 50 mg XL tablet   5. Elevated cholesterol  E78.00 272.0 atorvastatin (LIPITOR) 40 mg tablet   6. Rash  R21 782.1 nystatin-triamcinolone (MYCOLOG II) topical cream   7.  Annual physical exam  Z00.00 V70.0        Orders Placed This Encounter    HEMOGLOBIN A1C WITH EAG    LIPID PANEL    HEPATIC FUNCTION PANEL    insulin detemir (LEVEMIR FLEXPEN SC)    sevelamer carbonate (RENVELA) 800 mg tab tab    Insulin Needles, Disposable, (Lalita Pen Needle) 32 gauge x 5/32\" ndle    metoprolol succinate (TOPROL-XL) 50 mg XL tablet    atorvastatin (LIPITOR) 40 mg tablet    Levemir FlexTouch U-100 Insuln 100 unit/mL (3 mL) inpn    nystatin-triamcinolone (MYCOLOG II) topical cream   .ordersn    Follow-up and Dispositions    · Return in about 3 months (around 11/25/2020) for for diabetes follow up. Health is fragile, immunizations reviewed and screening discussed and done as per patient's desires. Exercise includes 4 components:  Stretching, core muscle, cardiovascular and balance techniques. Good posture is protective to your back - stand straight as much as possible. Exercise daily of 40 minutes of active exercise encouraged, a balanced diet with portions of fruits, vegetables and salads recommended. Watch sodium intake if high blood pressure is an issue. Labs ordered. If results are not mailed or phoned to you within 2 weeks, please call us.     David Ac MD

## 2020-09-01 LAB
ALBUMIN SERPL-MCNC: 3.6 G/DL (ref 3.5–5)
ALBUMIN/GLOB SERPL: 1.1 {RATIO} (ref 1.1–2.2)
ALP SERPL-CCNC: 116 U/L (ref 45–117)
ALT SERPL-CCNC: 16 U/L (ref 12–78)
AST SERPL-CCNC: 10 U/L (ref 15–37)
BILIRUB DIRECT SERPL-MCNC: 0.3 MG/DL (ref 0–0.2)
BILIRUB SERPL-MCNC: 1 MG/DL (ref 0.2–1)
CHOLEST SERPL-MCNC: 99 MG/DL
EST. AVERAGE GLUCOSE BLD GHB EST-MCNC: 180 MG/DL
GLOBULIN SER CALC-MCNC: 3.2 G/DL (ref 2–4)
HBA1C MFR BLD: 7.9 % (ref 4–5.6)
HDLC SERPL-MCNC: 34 MG/DL
HDLC SERPL: 2.9 {RATIO} (ref 0–5)
LDLC SERPL CALC-MCNC: 26.2 MG/DL (ref 0–100)
LIPID PROFILE,FLP: ABNORMAL
PROT SERPL-MCNC: 6.8 G/DL (ref 6.4–8.2)
TRIGL SERPL-MCNC: 194 MG/DL (ref ?–150)
VLDLC SERPL CALC-MCNC: 38.8 MG/DL

## 2020-11-30 ENCOUNTER — TRANSCRIBE ORDER (OUTPATIENT)
Dept: SCHEDULING | Age: 65
End: 2020-11-30

## 2020-11-30 DIAGNOSIS — Z12.31 ENCOUNTER FOR MAMMOGRAM TO ESTABLISH BASELINE MAMMOGRAM: Primary | ICD-10-CM

## 2021-02-22 ENCOUNTER — HOSPITAL ENCOUNTER (OUTPATIENT)
Dept: MAMMOGRAPHY | Age: 66
Discharge: HOME | End: 2021-02-22
Attending: OBSTETRICS & GYNECOLOGY
Payer: MEDICARE

## 2021-02-22 DIAGNOSIS — Z12.31 ENCOUNTER FOR MAMMOGRAM TO ESTABLISH BASELINE MAMMOGRAM: ICD-10-CM

## 2021-02-22 PROCEDURE — 77067 SCR MAMMO BI INCL CAD: CPT

## 2021-04-14 DIAGNOSIS — E78.00 ELEVATED CHOLESTEROL: ICD-10-CM

## 2021-04-14 NOTE — TELEPHONE ENCOUNTER
PCP: Odalis Elizondo MD    Last appt: 9/1/2020  No future appointments.     Requested Prescriptions     Pending Prescriptions Disp Refills    atorvastatin (LIPITOR) 40 mg tablet 90 Tab 1     Sig: TAKE 1 TABLET BY MOUTH DAILY       Prior labs and Blood pressures:  BP Readings from Last 3 Encounters:   08/25/20 150/78   09/12/19 130/71   05/21/19 129/75     Lab Results   Component Value Date/Time    Sodium 140 04/25/2019 10:44 AM    Potassium 3.9 04/25/2019 10:44 AM    Chloride 96 04/25/2019 10:44 AM    CO2 29 04/25/2019 10:44 AM    Anion gap 9 07/01/2017 02:51 AM    Glucose 186 (H) 04/25/2019 10:44 AM    BUN 21 04/25/2019 10:44 AM    Creatinine 3.54 (H) 04/25/2019 10:44 AM    BUN/Creatinine ratio 6 (L) 04/25/2019 10:44 AM    GFR est AA 15 (L) 04/25/2019 10:44 AM    GFR est non-AA 13 (L) 04/25/2019 10:44 AM    Calcium 9.3 04/25/2019 10:44 AM     Lab Results   Component Value Date/Time    Hemoglobin A1c 7.9 (H) 09/01/2020 06:29 PM    Hemoglobin A1c, External 6.6 06/30/2018     Lab Results   Component Value Date/Time    Cholesterol, total 99 09/01/2020 06:29 PM    HDL Cholesterol 34 09/01/2020 06:29 PM    LDL, calculated 26.2 09/01/2020 06:29 PM    VLDL, calculated 38.8 09/01/2020 06:29 PM    Triglyceride 194 (H) 09/01/2020 06:29 PM    CHOL/HDL Ratio 2.9 09/01/2020 06:29 PM     No results found for: VITD3, XQVID2, XQVID3, XQVID, VD3RIA    No results found for: TSH, TSH2, TSH3, TSHP, TSHEXT

## 2021-04-16 RX ORDER — ATORVASTATIN CALCIUM 40 MG/1
TABLET, FILM COATED ORAL
Qty: 90 TAB | Refills: 1 | Status: SHIPPED | OUTPATIENT
Start: 2021-04-16

## 2021-04-23 ENCOUNTER — PATIENT MESSAGE (OUTPATIENT)
Dept: FAMILY MEDICINE CLINIC | Age: 66
End: 2021-04-23

## 2021-08-03 PROBLEM — N17.9 ARF (ACUTE RENAL FAILURE) (HCC): Status: RESOLVED | Noted: 2017-06-28 | Resolved: 2021-08-03

## 2021-10-12 ENCOUNTER — TRANSCRIBE ORDER (OUTPATIENT)
Dept: SCHEDULING | Age: 66
End: 2021-10-12

## 2021-10-12 DIAGNOSIS — Z12.31 SCREENING MAMMOGRAM FOR HIGH-RISK PATIENT: Primary | ICD-10-CM

## 2022-02-23 ENCOUNTER — HOSPITAL ENCOUNTER (OUTPATIENT)
Dept: MAMMOGRAPHY | Age: 67
Discharge: HOME | End: 2022-02-23
Attending: OBSTETRICS & GYNECOLOGY
Payer: MEDICARE

## 2022-02-23 DIAGNOSIS — Z12.31 SCREENING MAMMOGRAM FOR HIGH-RISK PATIENT: ICD-10-CM

## 2022-02-23 PROCEDURE — 77067 SCR MAMMO BI INCL CAD: CPT

## 2022-03-18 PROBLEM — E66.811 OBESITY (BMI 30.0-34.9): Status: ACTIVE | Noted: 2017-06-29

## 2022-03-18 PROBLEM — E66.9 OBESITY (BMI 30.0-34.9): Status: ACTIVE | Noted: 2017-06-29

## 2022-03-18 PROBLEM — I10 HTN (HYPERTENSION): Status: ACTIVE | Noted: 2017-06-28

## 2022-03-19 PROBLEM — E11.21 TYPE 2 DIABETES WITH NEPHROPATHY (HCC): Status: ACTIVE | Noted: 2019-09-12

## 2022-03-19 PROBLEM — E11.9 DM TYPE 2 (DIABETES MELLITUS, TYPE 2) (HCC): Status: ACTIVE | Noted: 2017-06-28

## 2022-03-19 PROBLEM — D64.9 ANEMIA: Status: ACTIVE | Noted: 2017-06-28

## 2023-01-30 ENCOUNTER — TRANSCRIBE ORDER (OUTPATIENT)
Dept: SCHEDULING | Age: 68
End: 2023-01-30

## 2023-01-30 DIAGNOSIS — Z12.31 SCREENING MAMMOGRAM FOR HIGH-RISK PATIENT: Primary | ICD-10-CM

## 2023-03-01 ENCOUNTER — HOSPITAL ENCOUNTER (OUTPATIENT)
Dept: MAMMOGRAPHY | Age: 68
Discharge: HOME | End: 2023-03-01
Attending: OBSTETRICS & GYNECOLOGY
Payer: MEDICARE

## 2023-03-01 DIAGNOSIS — Z12.31 SCREENING MAMMOGRAM FOR HIGH-RISK PATIENT: ICD-10-CM

## 2023-03-01 PROCEDURE — 77067 SCR MAMMO BI INCL CAD: CPT

## 2024-02-14 ENCOUNTER — APPOINTMENT (OUTPATIENT)
Facility: HOSPITAL | Age: 69
End: 2024-02-14
Payer: MEDICARE

## 2024-02-14 ENCOUNTER — HOSPITAL ENCOUNTER (INPATIENT)
Facility: HOSPITAL | Age: 69
LOS: 3 days | Discharge: HOME | End: 2024-02-17
Attending: STUDENT IN AN ORGANIZED HEALTH CARE EDUCATION/TRAINING PROGRAM | Admitting: INTERNAL MEDICINE
Payer: MEDICARE

## 2024-02-14 DIAGNOSIS — R74.8 ELEVATED LIVER ENZYMES: Primary | ICD-10-CM

## 2024-02-14 DIAGNOSIS — R17 ELEVATED BILIRUBIN: ICD-10-CM

## 2024-02-14 DIAGNOSIS — L29.9 PRURITUS: ICD-10-CM

## 2024-02-14 DIAGNOSIS — K83.1 CHOLESTASIS: ICD-10-CM

## 2024-02-14 PROBLEM — E11.9 DM TYPE 2 (DIABETES MELLITUS, TYPE 2) (HCC): Status: RESOLVED | Noted: 2017-06-28 | Resolved: 2024-02-14

## 2024-02-14 PROBLEM — E11.29 DM TYPE 2 CAUSING RENAL DISEASE (HCC): Status: ACTIVE | Noted: 2024-02-14

## 2024-02-14 PROBLEM — R79.89 LFT ELEVATION: Status: ACTIVE | Noted: 2024-02-14

## 2024-02-14 PROBLEM — D64.9 ANEMIA: Status: RESOLVED | Noted: 2017-06-28 | Resolved: 2024-02-14

## 2024-02-14 PROBLEM — B17.9 ACUTE HEPATITIS: Status: ACTIVE | Noted: 2024-02-14

## 2024-02-14 PROBLEM — E80.6 HYPERBILIRUBINEMIA: Status: ACTIVE | Noted: 2024-02-14

## 2024-02-14 PROBLEM — I10 HYPERTENSION: Status: ACTIVE | Noted: 2017-06-28

## 2024-02-14 PROBLEM — D84.821 IMMUNOSUPPRESSION DUE TO CHRONIC STEROID USE (HCC): Status: ACTIVE | Noted: 2024-02-14

## 2024-02-14 PROBLEM — E11.21 TYPE 2 DIABETES WITH NEPHROPATHY (HCC): Status: RESOLVED | Noted: 2019-09-12 | Resolved: 2024-02-14

## 2024-02-14 PROBLEM — Z79.52 IMMUNOSUPPRESSION DUE TO CHRONIC STEROID USE (HCC): Status: ACTIVE | Noted: 2024-02-14

## 2024-02-14 PROBLEM — Z94.0 RENAL TRANSPLANT RECIPIENT: Status: ACTIVE | Noted: 2024-02-14

## 2024-02-14 PROBLEM — E11.59 DM TYPE 2 CAUSING VASCULAR DISEASE (HCC): Status: ACTIVE | Noted: 2024-02-14

## 2024-02-14 PROBLEM — T38.0X5A IMMUNOSUPPRESSION DUE TO CHRONIC STEROID USE (HCC): Status: ACTIVE | Noted: 2024-02-14

## 2024-02-14 PROBLEM — N18.30 CKD (CHRONIC KIDNEY DISEASE), STAGE III (HCC): Status: ACTIVE | Noted: 2024-02-14

## 2024-02-14 PROBLEM — I25.10 CAD (CORONARY ARTERY DISEASE): Status: ACTIVE | Noted: 2024-02-14

## 2024-02-14 LAB
25(OH)D3 SERPL-MCNC: 41.6 NG/ML (ref 30–100)
ALBUMIN SERPL-MCNC: 3.4 G/DL (ref 3.5–5)
ALBUMIN/GLOB SERPL: 1 (ref 1.1–2.2)
ALP SERPL-CCNC: 339 U/L (ref 45–117)
ALT SERPL-CCNC: 314 U/L (ref 12–78)
AMPHET UR QL SCN: NEGATIVE
ANION GAP SERPL CALC-SCNC: 6 MMOL/L (ref 5–15)
APAP SERPL-MCNC: <2 UG/ML (ref 10–30)
APPEARANCE UR: CLEAR
APPEARANCE UR: CLEAR
AST SERPL-CCNC: 303 U/L (ref 15–37)
B PERT DNA SPEC QL NAA+PROBE: NOT DETECTED
BACTERIA URNS QL MICRO: ABNORMAL /HPF
BACTERIA URNS QL MICRO: ABNORMAL /HPF
BARBITURATES UR QL SCN: NEGATIVE
BASOPHILS # BLD: 0 K/UL (ref 0–0.1)
BASOPHILS NFR BLD: 0 % (ref 0–1)
BENZODIAZ UR QL: NEGATIVE
BILIRUB SERPL-MCNC: 7.8 MG/DL (ref 0.2–1)
BILIRUB UR QL CFM: NEGATIVE
BILIRUB UR QL CFM: POSITIVE
BORDETELLA PARAPERTUSSIS BY PCR: NOT DETECTED
BUN SERPL-MCNC: 28 MG/DL (ref 6–20)
BUN/CREAT SERPL: 17 (ref 12–20)
C PNEUM DNA SPEC QL NAA+PROBE: NOT DETECTED
CALCIUM SERPL-MCNC: 10.4 MG/DL (ref 8.5–10.1)
CALCIUM SERPL-MCNC: 9.8 MG/DL (ref 8.5–10.1)
CANNABINOIDS UR QL SCN: NEGATIVE
CHLORIDE SERPL-SCNC: 104 MMOL/L (ref 97–108)
CO2 SERPL-SCNC: 28 MMOL/L (ref 21–32)
COCAINE UR QL SCN: NEGATIVE
COLOR UR: YELLOW
COLOR UR: YELLOW
COMMENT:: NORMAL
CREAT SERPL-MCNC: 1.66 MG/DL (ref 0.55–1.02)
CREAT UR-MCNC: 83 MG/DL
D DIMER PPP FEU-MCNC: 1.08 MG/L FEU (ref 0–0.65)
DIFFERENTIAL METHOD BLD: ABNORMAL
EOSINOPHIL # BLD: 0.1 K/UL (ref 0–0.4)
EOSINOPHIL NFR BLD: 1 % (ref 0–7)
EPITH CASTS URNS QL MICRO: ABNORMAL /LPF
EPITH CASTS URNS QL MICRO: ABNORMAL /LPF
ERYTHROCYTE [DISTWIDTH] IN BLOOD BY AUTOMATED COUNT: 16.1 % (ref 11.5–14.5)
EST. AVERAGE GLUCOSE BLD GHB EST-MCNC: 206 MG/DL
ETHANOL SERPL-MCNC: <10 MG/DL (ref 0–0.08)
FERRITIN SERPL-MCNC: 2258 NG/ML (ref 8–252)
FLUAV SUBTYP SPEC NAA+PROBE: NOT DETECTED
FLUBV RNA SPEC QL NAA+PROBE: NOT DETECTED
FOLATE SERPL-MCNC: 20.6 NG/ML (ref 5–21)
GLOBULIN SER CALC-MCNC: 3.5 G/DL (ref 2–4)
GLUCOSE BLD STRIP.AUTO-MCNC: 135 MG/DL (ref 65–117)
GLUCOSE BLD STRIP.AUTO-MCNC: 238 MG/DL (ref 65–117)
GLUCOSE SERPL-MCNC: 292 MG/DL (ref 65–100)
GLUCOSE UR STRIP.AUTO-MCNC: >1000 MG/DL
GLUCOSE UR STRIP.AUTO-MCNC: >1000 MG/DL
HADV DNA SPEC QL NAA+PROBE: NOT DETECTED
HBA1C MFR BLD: 8.8 % (ref 4–5.6)
HCOV 229E RNA SPEC QL NAA+PROBE: NOT DETECTED
HCOV HKU1 RNA SPEC QL NAA+PROBE: NOT DETECTED
HCOV NL63 RNA SPEC QL NAA+PROBE: NOT DETECTED
HCOV OC43 RNA SPEC QL NAA+PROBE: NOT DETECTED
HCT VFR BLD AUTO: 35.4 % (ref 35–47)
HGB BLD-MCNC: 11.5 G/DL (ref 11.5–16)
HGB UR QL STRIP: NEGATIVE
HGB UR QL STRIP: NEGATIVE
HMPV RNA SPEC QL NAA+PROBE: NOT DETECTED
HPIV1 RNA SPEC QL NAA+PROBE: NOT DETECTED
HPIV2 RNA SPEC QL NAA+PROBE: NOT DETECTED
HPIV3 RNA SPEC QL NAA+PROBE: NOT DETECTED
HPIV4 RNA SPEC QL NAA+PROBE: NOT DETECTED
IMM GRANULOCYTES # BLD AUTO: 0 K/UL (ref 0–0.04)
IMM GRANULOCYTES NFR BLD AUTO: 0 % (ref 0–0.5)
IRON SATN MFR SERPL: 16 % (ref 20–50)
IRON SERPL-MCNC: 35 UG/DL (ref 35–150)
KETONES UR QL STRIP.AUTO: ABNORMAL MG/DL
KETONES UR QL STRIP.AUTO: ABNORMAL MG/DL
LDH SERPL L TO P-CCNC: 245 U/L (ref 81–246)
LEUKOCYTE ESTERASE UR QL STRIP.AUTO: NEGATIVE
LEUKOCYTE ESTERASE UR QL STRIP.AUTO: NEGATIVE
LYMPHOCYTES # BLD: 0.5 K/UL (ref 0.8–3.5)
LYMPHOCYTES NFR BLD: 6 % (ref 12–49)
Lab: NORMAL
M PNEUMO DNA SPEC QL NAA+PROBE: NOT DETECTED
MAGNESIUM SERPL-MCNC: 2 MG/DL (ref 1.6–2.4)
MCH RBC QN AUTO: 33 PG (ref 26–34)
MCHC RBC AUTO-ENTMCNC: 32.5 G/DL (ref 30–36.5)
MCV RBC AUTO: 101.4 FL (ref 80–99)
METHADONE UR QL: NEGATIVE
MONOCYTES # BLD: 0.4 K/UL (ref 0–1)
MONOCYTES NFR BLD: 4 % (ref 5–13)
NEUTS SEG # BLD: 8.1 K/UL (ref 1.8–8)
NEUTS SEG NFR BLD: 89 % (ref 32–75)
NITRITE UR QL STRIP.AUTO: NEGATIVE
NITRITE UR QL STRIP.AUTO: NEGATIVE
NRBC # BLD: 0 K/UL (ref 0–0.01)
NRBC BLD-RTO: 0 PER 100 WBC
OPIATES UR QL: NEGATIVE
PCP UR QL: NEGATIVE
PH UR STRIP: 5 (ref 5–8)
PH UR STRIP: 5 (ref 5–8)
PHOSPHATE SERPL-MCNC: 2.6 MG/DL (ref 2.6–4.7)
PLATELET # BLD AUTO: 190 K/UL (ref 150–400)
PMV BLD AUTO: 12 FL (ref 8.9–12.9)
POTASSIUM SERPL-SCNC: 3.1 MMOL/L (ref 3.5–5.1)
PROCALCITONIN SERPL-MCNC: 4.42 NG/ML
PROT SERPL-MCNC: 6.9 G/DL (ref 6.4–8.2)
PROT UR STRIP-MCNC: ABNORMAL MG/DL
PROT UR STRIP-MCNC: ABNORMAL MG/DL
PTH-INTACT SERPL-MCNC: 107 PG/ML (ref 18.4–88)
RBC # BLD AUTO: 3.49 M/UL (ref 3.8–5.2)
RBC #/AREA URNS HPF: ABNORMAL /HPF (ref 0–5)
RBC #/AREA URNS HPF: ABNORMAL /HPF (ref 0–5)
RBC MORPH BLD: ABNORMAL
RBC MORPH BLD: ABNORMAL
RSV RNA SPEC QL NAA+PROBE: NOT DETECTED
RV+EV RNA SPEC QL NAA+PROBE: NOT DETECTED
SARS-COV-2 RNA RESP QL NAA+PROBE: NOT DETECTED
SERVICE CMNT-IMP: ABNORMAL
SERVICE CMNT-IMP: ABNORMAL
SODIUM SERPL-SCNC: 138 MMOL/L (ref 136–145)
SODIUM UR-SCNC: 10 MMOL/L
SP GR UR REFRACTOMETRY: >1.03 (ref 1–1.03)
SP GR UR REFRACTOMETRY: >1.03 (ref 1–1.03)
SPECIMEN HOLD: NORMAL
SPECIMEN HOLD: NORMAL
TIBC SERPL-MCNC: 214 UG/DL (ref 250–450)
TSH SERPL DL<=0.05 MIU/L-ACNC: 1.83 UIU/ML (ref 0.36–3.74)
UROBILINOGEN UR QL STRIP.AUTO: 1 EU/DL (ref 0.2–1)
UROBILINOGEN UR QL STRIP.AUTO: 1 EU/DL (ref 0.2–1)
UUN UR-MCNC: 491 MG/DL
VIT B12 SERPL-MCNC: 794 PG/ML (ref 193–986)
WBC # BLD AUTO: 9.1 K/UL (ref 3.6–11)
WBC URNS QL MICRO: ABNORMAL /HPF (ref 0–4)
WBC URNS QL MICRO: ABNORMAL /HPF (ref 0–4)

## 2024-02-14 PROCEDURE — 85379 FIBRIN DEGRADATION QUANT: CPT

## 2024-02-14 PROCEDURE — 82306 VITAMIN D 25 HYDROXY: CPT

## 2024-02-14 PROCEDURE — 83540 ASSAY OF IRON: CPT

## 2024-02-14 PROCEDURE — 76705 ECHO EXAM OF ABDOMEN: CPT

## 2024-02-14 PROCEDURE — 86381 MITOCHONDRIAL ANTIBODY EACH: CPT

## 2024-02-14 PROCEDURE — 85025 COMPLETE CBC W/AUTO DIFF WBC: CPT

## 2024-02-14 PROCEDURE — 82746 ASSAY OF FOLIC ACID SERUM: CPT

## 2024-02-14 PROCEDURE — 94761 N-INVAS EAR/PLS OXIMETRY MLT: CPT

## 2024-02-14 PROCEDURE — 82962 GLUCOSE BLOOD TEST: CPT

## 2024-02-14 PROCEDURE — 82607 VITAMIN B-12: CPT

## 2024-02-14 PROCEDURE — 86301 IMMUNOASSAY TUMOR CA 19-9: CPT

## 2024-02-14 PROCEDURE — 6360000002 HC RX W HCPCS: Performed by: INTERNAL MEDICINE

## 2024-02-14 PROCEDURE — 74176 CT ABD & PELVIS W/O CONTRAST: CPT

## 2024-02-14 PROCEDURE — 80053 COMPREHEN METABOLIC PANEL: CPT

## 2024-02-14 PROCEDURE — 2580000003 HC RX 258: Performed by: INTERNAL MEDICINE

## 2024-02-14 PROCEDURE — 99285 EMERGENCY DEPT VISIT HI MDM: CPT

## 2024-02-14 PROCEDURE — 82728 ASSAY OF FERRITIN: CPT

## 2024-02-14 PROCEDURE — 6360000002 HC RX W HCPCS

## 2024-02-14 PROCEDURE — 83615 LACTATE (LD) (LDH) ENZYME: CPT

## 2024-02-14 PROCEDURE — 84540 ASSAY OF URINE/UREA-N: CPT

## 2024-02-14 PROCEDURE — 82105 ALPHA-FETOPROTEIN SERUM: CPT

## 2024-02-14 PROCEDURE — 83010 ASSAY OF HAPTOGLOBIN QUANT: CPT

## 2024-02-14 PROCEDURE — 84300 ASSAY OF URINE SODIUM: CPT

## 2024-02-14 PROCEDURE — 86038 ANTINUCLEAR ANTIBODIES: CPT

## 2024-02-14 PROCEDURE — 2580000003 HC RX 258

## 2024-02-14 PROCEDURE — 84145 PROCALCITONIN (PCT): CPT

## 2024-02-14 PROCEDURE — 86015 ACTIN ANTIBODY EACH: CPT

## 2024-02-14 PROCEDURE — 84100 ASSAY OF PHOSPHORUS: CPT

## 2024-02-14 PROCEDURE — 80299 QUANTITATIVE ASSAY DRUG: CPT

## 2024-02-14 PROCEDURE — 87086 URINE CULTURE/COLONY COUNT: CPT

## 2024-02-14 PROCEDURE — 1100000000 HC RM PRIVATE

## 2024-02-14 PROCEDURE — 83550 IRON BINDING TEST: CPT

## 2024-02-14 PROCEDURE — 80143 DRUG ASSAY ACETAMINOPHEN: CPT

## 2024-02-14 PROCEDURE — 87798 DETECT AGENT NOS DNA AMP: CPT

## 2024-02-14 PROCEDURE — 6370000000 HC RX 637 (ALT 250 FOR IP): Performed by: INTERNAL MEDICINE

## 2024-02-14 PROCEDURE — 0202U NFCT DS 22 TRGT SARS-COV-2: CPT

## 2024-02-14 PROCEDURE — 86037 ANCA TITER EACH ANTIBODY: CPT

## 2024-02-14 PROCEDURE — 83970 ASSAY OF PARATHORMONE: CPT

## 2024-02-14 PROCEDURE — 83735 ASSAY OF MAGNESIUM: CPT

## 2024-02-14 PROCEDURE — 82570 ASSAY OF URINE CREATININE: CPT

## 2024-02-14 PROCEDURE — 80074 ACUTE HEPATITIS PANEL: CPT

## 2024-02-14 PROCEDURE — 36415 COLL VENOUS BLD VENIPUNCTURE: CPT

## 2024-02-14 PROCEDURE — 83036 HEMOGLOBIN GLYCOSYLATED A1C: CPT

## 2024-02-14 PROCEDURE — 80307 DRUG TEST PRSMV CHEM ANLYZR: CPT

## 2024-02-14 PROCEDURE — 82378 CARCINOEMBRYONIC ANTIGEN: CPT

## 2024-02-14 PROCEDURE — 83516 IMMUNOASSAY NONANTIBODY: CPT

## 2024-02-14 PROCEDURE — 84443 ASSAY THYROID STIM HORMONE: CPT

## 2024-02-14 PROCEDURE — 82077 ASSAY SPEC XCP UR&BREATH IA: CPT

## 2024-02-14 PROCEDURE — 80197 ASSAY OF TACROLIMUS: CPT

## 2024-02-14 PROCEDURE — 96374 THER/PROPH/DIAG INJ IV PUSH: CPT

## 2024-02-14 PROCEDURE — 81001 URINALYSIS AUTO W/SCOPE: CPT

## 2024-02-14 RX ORDER — SODIUM CHLORIDE AND POTASSIUM CHLORIDE 300; 900 MG/100ML; MG/100ML
INJECTION, SOLUTION INTRAVENOUS CONTINUOUS
Status: DISCONTINUED | OUTPATIENT
Start: 2024-02-14 | End: 2024-02-14

## 2024-02-14 RX ORDER — NICOTINE POLACRILEX 2 MG
1 GUM BUCCAL DAILY
COMMUNITY

## 2024-02-14 RX ORDER — INSULIN LISPRO 100 [IU]/ML
0-4 INJECTION, SOLUTION INTRAVENOUS; SUBCUTANEOUS NIGHTLY
Status: DISCONTINUED | OUTPATIENT
Start: 2024-02-14 | End: 2024-02-17 | Stop reason: HOSPADM

## 2024-02-14 RX ORDER — TACROLIMUS 0.5 MG/1
0.5 CAPSULE ORAL 2 TIMES DAILY
COMMUNITY

## 2024-02-14 RX ORDER — DEXTROSE MONOHYDRATE 100 MG/ML
INJECTION, SOLUTION INTRAVENOUS CONTINUOUS PRN
Status: DISCONTINUED | OUTPATIENT
Start: 2024-02-14 | End: 2024-02-17 | Stop reason: HOSPADM

## 2024-02-14 RX ORDER — CARVEDILOL 12.5 MG/1
25 TABLET ORAL 2 TIMES DAILY WITH MEALS
Status: DISCONTINUED | OUTPATIENT
Start: 2024-02-14 | End: 2024-02-17 | Stop reason: HOSPADM

## 2024-02-14 RX ORDER — SODIUM CHLORIDE 0.9 % (FLUSH) 0.9 %
5-40 SYRINGE (ML) INJECTION PRN
Status: DISCONTINUED | OUTPATIENT
Start: 2024-02-14 | End: 2024-02-17 | Stop reason: HOSPADM

## 2024-02-14 RX ORDER — ONDANSETRON 4 MG/1
4 TABLET, ORALLY DISINTEGRATING ORAL EVERY 8 HOURS PRN
Status: DISCONTINUED | OUTPATIENT
Start: 2024-02-14 | End: 2024-02-17 | Stop reason: HOSPADM

## 2024-02-14 RX ORDER — CHLORTHALIDONE 25 MG/1
12.5 TABLET ORAL DAILY
Status: ON HOLD | COMMUNITY
End: 2024-02-15 | Stop reason: HOSPADM

## 2024-02-14 RX ORDER — INSULIN GLARGINE 100 [IU]/ML
30 INJECTION, SOLUTION SUBCUTANEOUS NIGHTLY
Status: DISCONTINUED | OUTPATIENT
Start: 2024-02-14 | End: 2024-02-17 | Stop reason: HOSPADM

## 2024-02-14 RX ORDER — INSULIN LISPRO 100 [IU]/ML
6 INJECTION, SOLUTION INTRAVENOUS; SUBCUTANEOUS 3 TIMES DAILY
COMMUNITY

## 2024-02-14 RX ORDER — SODIUM CHLORIDE 9 MG/ML
INJECTION, SOLUTION INTRAVENOUS CONTINUOUS
Status: DISCONTINUED | OUTPATIENT
Start: 2024-02-14 | End: 2024-02-15

## 2024-02-14 RX ORDER — 0.9 % SODIUM CHLORIDE 0.9 %
500 INTRAVENOUS SOLUTION INTRAVENOUS ONCE
Status: DISCONTINUED | OUTPATIENT
Start: 2024-02-14 | End: 2024-02-14

## 2024-02-14 RX ORDER — SODIUM CHLORIDE 9 MG/ML
INJECTION, SOLUTION INTRAVENOUS PRN
Status: DISCONTINUED | OUTPATIENT
Start: 2024-02-14 | End: 2024-02-17 | Stop reason: HOSPADM

## 2024-02-14 RX ORDER — 0.9 % SODIUM CHLORIDE 0.9 %
500 INTRAVENOUS SOLUTION INTRAVENOUS ONCE
Status: COMPLETED | OUTPATIENT
Start: 2024-02-14 | End: 2024-02-14

## 2024-02-14 RX ORDER — 0.9 % SODIUM CHLORIDE 0.9 %
1000 INTRAVENOUS SOLUTION INTRAVENOUS ONCE
Status: DISCONTINUED | OUTPATIENT
Start: 2024-02-14 | End: 2024-02-14

## 2024-02-14 RX ORDER — POLYETHYLENE GLYCOL 3350 17 G/17G
17 POWDER, FOR SOLUTION ORAL DAILY PRN
Status: DISCONTINUED | OUTPATIENT
Start: 2024-02-14 | End: 2024-02-17 | Stop reason: HOSPADM

## 2024-02-14 RX ORDER — INSULIN LISPRO 100 [IU]/ML
0-8 INJECTION, SOLUTION INTRAVENOUS; SUBCUTANEOUS
Status: DISCONTINUED | OUTPATIENT
Start: 2024-02-14 | End: 2024-02-17 | Stop reason: HOSPADM

## 2024-02-14 RX ORDER — ISOSORBIDE MONONITRATE 30 MG/1
30 TABLET, EXTENDED RELEASE ORAL DAILY
Status: DISCONTINUED | OUTPATIENT
Start: 2024-02-14 | End: 2024-02-14

## 2024-02-14 RX ORDER — POTASSIUM CHLORIDE 20 MEQ/1
20 TABLET, EXTENDED RELEASE ORAL
COMMUNITY

## 2024-02-14 RX ORDER — SODIUM CHLORIDE 0.9 % (FLUSH) 0.9 %
5-40 SYRINGE (ML) INJECTION EVERY 12 HOURS SCHEDULED
Status: DISCONTINUED | OUTPATIENT
Start: 2024-02-14 | End: 2024-02-17 | Stop reason: HOSPADM

## 2024-02-14 RX ORDER — AZATHIOPRINE 50 MG/1
100 TABLET ORAL DAILY
Status: ON HOLD | COMMUNITY
End: 2024-02-15 | Stop reason: HOSPADM

## 2024-02-14 RX ORDER — POTASSIUM CHLORIDE 750 MG/1
40 TABLET, FILM COATED, EXTENDED RELEASE ORAL ONCE
Status: COMPLETED | OUTPATIENT
Start: 2024-02-14 | End: 2024-02-14

## 2024-02-14 RX ORDER — ISOSORBIDE MONONITRATE 60 MG/1
60 TABLET, EXTENDED RELEASE ORAL DAILY
COMMUNITY

## 2024-02-14 RX ORDER — DIPHENHYDRAMINE HYDROCHLORIDE 50 MG/ML
25 INJECTION INTRAMUSCULAR; INTRAVENOUS
Status: COMPLETED | OUTPATIENT
Start: 2024-02-14 | End: 2024-02-14

## 2024-02-14 RX ORDER — TACROLIMUS 0.5 MG/1
0.5 CAPSULE ORAL 2 TIMES DAILY
Status: DISCONTINUED | OUTPATIENT
Start: 2024-02-14 | End: 2024-02-17 | Stop reason: HOSPADM

## 2024-02-14 RX ORDER — DIPHENHYDRAMINE HCL 25 MG
25 CAPSULE ORAL EVERY 6 HOURS PRN
Status: DISCONTINUED | OUTPATIENT
Start: 2024-02-14 | End: 2024-02-17 | Stop reason: HOSPADM

## 2024-02-14 RX ORDER — ONDANSETRON 2 MG/ML
4 INJECTION INTRAMUSCULAR; INTRAVENOUS EVERY 6 HOURS PRN
Status: DISCONTINUED | OUTPATIENT
Start: 2024-02-14 | End: 2024-02-17 | Stop reason: HOSPADM

## 2024-02-14 RX ORDER — ISOSORBIDE MONONITRATE 30 MG/1
60 TABLET, EXTENDED RELEASE ORAL DAILY
Status: DISCONTINUED | OUTPATIENT
Start: 2024-02-15 | End: 2024-02-17 | Stop reason: HOSPADM

## 2024-02-14 RX ORDER — ACETAMINOPHEN 325 MG/1
650 TABLET ORAL EVERY 6 HOURS PRN
Status: DISCONTINUED | OUTPATIENT
Start: 2024-02-14 | End: 2024-02-17 | Stop reason: HOSPADM

## 2024-02-14 RX ORDER — PREDNISONE 5 MG/1
5 TABLET ORAL DAILY
COMMUNITY

## 2024-02-14 RX ORDER — AZATHIOPRINE 50 MG/1
50 TABLET ORAL DAILY
Status: DISCONTINUED | OUTPATIENT
Start: 2024-02-14 | End: 2024-02-14

## 2024-02-14 RX ORDER — ENOXAPARIN SODIUM 100 MG/ML
40 INJECTION SUBCUTANEOUS DAILY
Status: DISCONTINUED | OUTPATIENT
Start: 2024-02-14 | End: 2024-02-17 | Stop reason: HOSPADM

## 2024-02-14 RX ORDER — VALACYCLOVIR HYDROCHLORIDE 500 MG/1
500 TABLET, FILM COATED ORAL 2 TIMES DAILY
COMMUNITY

## 2024-02-14 RX ORDER — PREDNISONE 5 MG/1
5 TABLET ORAL DAILY
Status: DISCONTINUED | OUTPATIENT
Start: 2024-02-15 | End: 2024-02-17 | Stop reason: HOSPADM

## 2024-02-14 RX ORDER — ACETAMINOPHEN 650 MG/1
650 SUPPOSITORY RECTAL EVERY 6 HOURS PRN
Status: DISCONTINUED | OUTPATIENT
Start: 2024-02-14 | End: 2024-02-17 | Stop reason: HOSPADM

## 2024-02-14 RX ADMIN — POTASSIUM CHLORIDE 40 MEQ: 750 TABLET, EXTENDED RELEASE ORAL at 14:48

## 2024-02-14 RX ADMIN — SODIUM CHLORIDE: 9 INJECTION, SOLUTION INTRAVENOUS at 18:41

## 2024-02-14 RX ADMIN — INSULIN LISPRO 2 UNITS: 100 INJECTION, SOLUTION INTRAVENOUS; SUBCUTANEOUS at 17:55

## 2024-02-14 RX ADMIN — INSULIN GLARGINE 30 UNITS: 100 INJECTION, SOLUTION SUBCUTANEOUS at 21:13

## 2024-02-14 RX ADMIN — ENOXAPARIN SODIUM 40 MG: 100 INJECTION SUBCUTANEOUS at 14:48

## 2024-02-14 RX ADMIN — TACROLIMUS 0.5 MG: 0.5 CAPSULE ORAL at 21:35

## 2024-02-14 RX ADMIN — SODIUM CHLORIDE, PRESERVATIVE FREE 10 ML: 5 INJECTION INTRAVENOUS at 21:15

## 2024-02-14 RX ADMIN — CARVEDILOL 25 MG: 12.5 TABLET, FILM COATED ORAL at 17:54

## 2024-02-14 RX ADMIN — DIPHENHYDRAMINE HYDROCHLORIDE 25 MG: 50 INJECTION INTRAMUSCULAR; INTRAVENOUS at 08:32

## 2024-02-14 RX ADMIN — SODIUM CHLORIDE: 9 INJECTION, SOLUTION INTRAVENOUS at 12:30

## 2024-02-14 RX ADMIN — SODIUM CHLORIDE, PRESERVATIVE FREE 10 ML: 5 INJECTION INTRAVENOUS at 21:24

## 2024-02-14 RX ADMIN — DIPHENHYDRAMINE HYDROCHLORIDE 25 MG: 25 CAPSULE ORAL at 18:34

## 2024-02-14 RX ADMIN — DIPHENHYDRAMINE HYDROCHLORIDE 25 MG: 25 CAPSULE ORAL at 12:32

## 2024-02-14 RX ADMIN — SODIUM CHLORIDE 500 ML: 9 INJECTION, SOLUTION INTRAVENOUS at 08:30

## 2024-02-14 ASSESSMENT — LIFESTYLE VARIABLES
HOW OFTEN DO YOU HAVE A DRINK CONTAINING ALCOHOL: NEVER
HOW MANY STANDARD DRINKS CONTAINING ALCOHOL DO YOU HAVE ON A TYPICAL DAY: PATIENT DOES NOT DRINK

## 2024-02-14 ASSESSMENT — PAIN - FUNCTIONAL ASSESSMENT
PAIN_FUNCTIONAL_ASSESSMENT: NONE - DENIES PAIN
PAIN_FUNCTIONAL_ASSESSMENT: NONE - DENIES PAIN

## 2024-02-14 ASSESSMENT — PAIN SCALES - GENERAL
PAINLEVEL_OUTOF10: 0

## 2024-02-14 NOTE — CONSULTS
M) 20 MEQ extended release tablet 2/13/2024  Yes Yes   Sig: Take 1 tablet by mouth nightly   predniSONE (DELTASONE) 5 MG tablet 2/14/2024  Yes Yes   Sig: Take 1 tablet by mouth daily Indications: kideny transplant   sevelamer (RENVELA) 800 MG tablet   Yes No   Sig: ceived the following from [facility]: Outside name: sevelamer carbonate (RENVELA) 800 mg tab tab   tacrolimus (PROGRAF) 0.5 MG capsule 2/14/2024 at am  Yes Yes   Sig: Take 1 capsule by mouth 2 times daily   valACYclovir (VALTREX) 500 MG tablet 2/14/2024 at am  Yes Yes   Sig: Take 1 tablet by mouth 2 times daily      Facility-Administered Medications: None       Hospital Medications:  Current Facility-Administered Medications   Medication Dose Route Frequency    carvedilol (COREG) tablet 25 mg  25 mg Oral BID WC    insulin glargine (LANTUS) injection vial 30 Units  30 Units SubCUTAneous Nightly    [START ON 2/15/2024] predniSONE (DELTASONE) tablet 5 mg  5 mg Oral Daily    tacrolimus (proGRAF) capsule 0.5 mg  0.5 mg Oral BID    glucose chewable tablet 16 g  4 tablet Oral PRN    dextrose bolus 10% 125 mL  125 mL IntraVENous PRN    Or    dextrose bolus 10% 250 mL  250 mL IntraVENous PRN    glucagon injection 1 mg  1 mg SubCUTAneous PRN    dextrose 10 % infusion   IntraVENous Continuous PRN    sodium chloride flush 0.9 % injection 5-40 mL  5-40 mL IntraVENous 2 times per day    sodium chloride flush 0.9 % injection 5-40 mL  5-40 mL IntraVENous PRN    0.9 % sodium chloride infusion   IntraVENous PRN    [Held by provider] ondansetron (ZOFRAN-ODT) disintegrating tablet 4 mg  4 mg Oral Q8H PRN    Or    [Held by provider] ondansetron (ZOFRAN) injection 4 mg  4 mg IntraVENous Q6H PRN    polyethylene glycol (GLYCOLAX) packet 17 g  17 g Oral Daily PRN    acetaminophen (TYLENOL) tablet 650 mg  650 mg Oral Q6H PRN    Or    acetaminophen (TYLENOL) suppository 650 mg  650 mg Rectal Q6H PRN    enoxaparin (LOVENOX) injection 40 mg  40 mg SubCUTAneous Daily  Absolute 0.5 (L) 0.8 - 3.5 K/UL    Monocytes Absolute 0.4 0.0 - 1.0 K/UL    Eosinophils Absolute 0.1 0.0 - 0.4 K/UL    Basophils Absolute 0.0 0.0 - 0.1 K/UL    Absolute Immature Granulocyte 0.0 0.00 - 0.04 K/UL    Differential Type SMEAR SCANNED      RBC Comment ANISOCYTOSIS  1+        RBC Comment MACROCYTOSIS  1+       CMP    Collection Time: 02/14/24  8:23 AM   Result Value Ref Range    Sodium 138 136 - 145 mmol/L    Potassium 3.1 (L) 3.5 - 5.1 mmol/L    Chloride 104 97 - 108 mmol/L    CO2 28 21 - 32 mmol/L    Anion Gap 6 5 - 15 mmol/L    Glucose 292 (H) 65 - 100 mg/dL    BUN 28 (H) 6 - 20 MG/DL    Creatinine 1.66 (H) 0.55 - 1.02 MG/DL    Bun/Cre Ratio 17 12 - 20      Est, Glom Filt Rate 34 (L) >60 ml/min/1.73m2    Calcium 10.4 (H) 8.5 - 10.1 MG/DL    Total Bilirubin 7.8 (H) 0.2 - 1.0 MG/DL     (H) 12 - 78 U/L     (H) 15 - 37 U/L    Alk Phosphatase 339 (H) 45 - 117 U/L    Total Protein 6.9 6.4 - 8.2 g/dL    Albumin 3.4 (L) 3.5 - 5.0 g/dL    Globulin 3.5 2.0 - 4.0 g/dL    Albumin/Globulin Ratio 1.0 (L) 1.1 - 2.2     Urinalysis with Microscopic    Collection Time: 02/14/24  9:46 AM   Result Value Ref Range    Color, UA YELLOW      Appearance CLEAR CLEAR      Specific Gravity, UA >1.030 (H) 1.003 - 1.030    pH, Urine 5.0 5.0 - 8.0      Protein, UA TRACE (A) NEG mg/dL    Glucose, UA >1000 (A) NEG mg/dL    Ketones, Urine TRACE (A) NEG mg/dL    Blood, Urine Negative NEG      Urobilinogen, Urine 1.0 0.2 - 1.0 EU/dL    Nitrite, Urine Negative NEG      Leukocyte Esterase, Urine Negative NEG      WBC, UA 0-4 0 - 4 /hpf    RBC, UA 0-5 0 - 5 /hpf    Epithelial Cells UA FEW FEW /lpf    BACTERIA, URINE 1+ (A) NEG /hpf   Urine Culture Hold Sample    Collection Time: 02/14/24  9:46 AM    Specimen: Urine   Result Value Ref Range    Specimen HOld        Urine on hold in Microbiology dept for 2 days.  If unpreserved urine is submitted, it cannot be used for addtional testing after 24 hours, recollection will be

## 2024-02-14 NOTE — H&P
[facility]    Hospitalist Admission Note                                                                                                                                  NAME:  Florina Herrera   :   1955   MRN:  [id number]     PCP:  Jim Hendricks MD     Date/Time of service:  2024 11:59 AM  To assist coordination of care and communication with nursing and staff, this note may be preliminary early in the day, but finalized by end of the day.        Subjective:     CHIEF COMPLAINT: itching     HISTORY OF PRESENT ILLNESS:     Ms. Herrera is a 68 y.o. female who presented to the Emergency Department complaining of itching. New, whole body, no rash.  She also noted dark stool.  No other symptoms.  Had renal transplant about 4 years ago, on immunosuppression.  ER finds acute hepatitis.  We will admit her for management.    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)     Obesity (BMI 30.0-34.9)     Renal transplant recipient         Past Surgical History:   Procedure Laterality Date    CYST INCISION AND DRAINAGE      GYN      c section    IR TUNNELED CATHETER PLACEMENT GREATER THAN 5 YEARS  2017    IR TUNNELED CATHETER PLACEMENT GREATER THAN 5 YEARS 2017 [facility]       Social History     Tobacco Use    Smoking status: Never    Smokeless tobacco: Never   Substance Use Topics    Alcohol use: Yes        Family History   Problem Relation Age of Onset    Hypertension Father     Kidney Disease Other         uncle, niece, cousin    Breast Cancer Sister     Heart Failure Father         CHF    Lupus Father     Alzheimer's Disease Mother     Diabetes Paternal Aunt         Type 2    Heart Attack Father

## 2024-02-14 NOTE — CONSULTS
NEPHROLOGY CONSULT NOTE     Patient: Florina Herrera MRN: [id number]  PCP: Jim Hendricks MD   :     1955  Age:   68 y.o.  Sex:  female      Referring physician: Sridhar Guerrero MD  Reason for consultation:   Admission Date: 2024  8:06 AM  LOS: 0 days        ASSESSMENT and PLAN :   S/p DDKT 2020 at [facility]  Baseline graft function is 1.3-1.4  Generalized itching with elevated LFTs and hyperbilirubinemia  Hypertension  Chronic hypokalemia  Diabetes mellitus type 2    Plan:  Plan to start on normal saline at the rate of 75 cc/h  Give potassium chloride 40 meq po x1  Check urine electrolytes, UPC  Continue tacrolimus 0.5 twice daily and prednisone 5 mg once daily  Imuran tends to do hepatotoxicity (hepatic sinusoidal obstruction syndrome)  I will hold Imuran  CT scan of abdomen and pelvis reviewed.  Benefit from GI input           Subjective:   HPI: Florina Herrera is a 68 y.o.  female with a history of ESKD due to diabetes mellitus type 2, status post  donor kidney transplant in 2020 at [facility], on azathioprine, tacrolimus and prednisone who presented to the emergency department with a complaint of generalized itching and yellowish discoloration of the sclera.  In the ER workup shows elevated liver function test and hyperbilirubinemia with a direct bili of 7.8.  Chemistry shows potassium 3.1 and creatinine was 1.6 and calcium was 10.4.  Patient baseline graft function is 1.3-1.4.  She has chronic hypokalemia and potassium chloride supplement at home.  No chest pain or shortness of breath.    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

## 2024-02-14 NOTE — ED PROVIDER NOTES
Hypertension     Obesity (BMI 30.0-34.9) 6/29/2017         SURGICAL HISTORY       Past Surgical History:   Procedure Laterality Date    CYST INCISION AND DRAINAGE      GYN      c section    IR TUNNELED CATHETER PLACEMENT GREATER THAN 5 YEARS  6/30/2017    IR TUNNELED CATHETER PLACEMENT GREATER THAN 5 YEARS 6/30/2017 [facility]         CURRENT MEDICATIONS       Previous Medications    ASPIRIN 81 MG EC TABLET    Take 81 mg by mouth daily    ATORVASTATIN (LIPITOR) 40 MG TABLET    TAKE 1 TABLET BY MOUTH DAILY    INSULIN DETEMIR (LEVEMIR FLEXTOUCH) 100 UNIT/ML INJECTION PEN    INJECT 10 UNITS SUBCUTANEOUSLY EVERY DAY , INCREASE BY 3 UNITS EVERY 2 DAYS TILL FASTING BLOOD GLUCOSE IS BETWEEN     LIDOCAINE-PRILOCAINE (EMLA) 2.5-2.5 % CREAM    ceived the following from [facility]: Outside name: lidocaine-prilocaine (EMLA) topical cream    LORATADINE (CLARITIN) 10 MG TABLET    Take 10 mg by mouth every other day    METOPROLOL SUCCINATE (TOPROL XL) 50 MG EXTENDED RELEASE TABLET    Take 50 mg by mouth daily    NITROGLYCERIN (NITROSTAT) 0.4 MG SL TABLET    ceived the following from [facility]: Outside name: nitroglycerin (NITROSTAT) 0.4 mg SL tablet    NYSTATIN-TRIAMCINOLONE (MYCOLOG II) 850119-2.1 UNIT/GM-% CREAM    Apply topically 2 times daily as needed    SEVELAMER (RENVELA) 800 MG TABLET    ceived the following from [facility]: Outside name: sevelamer carbonate (RENVELA) 800 mg tab tab       ALLERGIES     Penicillins and Lisinopril    FAMILY HISTORY       Family History   Problem Relation Age of Onset    Hypertension Father     Kidney Disease Other         uncle, niece, cousin    Breast Cancer Sister     Heart Failure Father         CHF    Lupus Father     Alzheimer's Disease Mother     Diabetes Paternal Aunt         Type 2    Heart Attack Father           SOCIAL HISTORY       Social History     Socioeconomic History    Marital status:    Tobacco Use     68-year-old female with history of kidney transplant who presents to the emergency department with generalized body itching worse in the chest and upper extremities and dark urine.  Upon arrival patient with hypertension without fever, tachycardia.  On exam there is an erythematous, blanching macular rash over her chest.  Patient with scleral icterus on exam.  No CVA or abdominal tenderness.    Differential diagnosis includes but is not limited to atopic dermatitis, contact dermatitis, medication side effect, hyperbilirubinemia/cholestasis.    CBC without evidence of leukocytosis or anemia.  CMP with mild hypokalemia, normal sodium.  Per chart review there is also evidence of kidney injury as previous creatinine measured in December 2023 was 1.23 and today it is measuring at 1.66.  GFR previously was also measured at 48 and today is measuring at 34.  Patient with significant elevation in liver enzymes, alk phosphatase and bilirubin.  Patient's urine is also positive for bilirubin.  Suspect cholestasis versus choledocholithiasis discussed with patient's symptoms and elevation in liver enzymes and bilirubin.  Will obtain ultrasound of the gallbladder and liver.  Ultrasound shows an unremarkable liver and gallbladder making cholestasis more likely.  Will proceed with CT imaging of the abdomen and pelvis.  Plan to admit patient to hospital service for ongoing management.  Patient is agreeable with plan for admission.        Amount and/or Complexity of Data Reviewed  Labs: ordered. Decision-making details documented in ED Course.  Radiology: ordered. Decision-making details documented in ED Course.    Risk  Prescription drug management.  Decision regarding hospitalization.      Creatinine 1.23 Gfr 48      REASSESSMENT     ED Course as of 02/14/24 1116   Wed Feb 14, 2024   0853 CBC with Auto Differential(!):    WBC 9.1   RBC 3.49(!)   Hemoglobin Quant 11.5   Hematocrit 35.4   .4(!)   MCH 33.0   MCHC 32.5   RDW

## 2024-02-14 NOTE — ED NOTES
TRANSFER - OUT REPORT:    Verbal report given to Alia on Florina Herrera  being transferred to 5th Floor for routine progression of patient care       Report consisted of patient's Situation, Background, Assessment and   Recommendations(SBAR).     Information from the following report(s) ED Encounter Summary, ED SBAR, MAR, and Recent Results was reviewed with the receiving nurse.    Bendena Fall Assessment:    Presents to emergency department  because of falls (Syncope, seizure, or loss of consciousness): No  Age > 70: No  Altered Mental Status, Intoxication with alcohol or substance confusion (Disorientation, impaired judgment, poor safety awaremess, or inability to follow instructions): No  Impaired Mobility: Ambulates or transfers with assistive devices or assistance; Unable to ambulate or transer.: No  Nursing Judgement: No          Lines:   Peripheral IV 02/14/24 Left Antecubital (Active)        Opportunity for questions and clarification was provided.      Patient transported with:  Tech

## 2024-02-14 NOTE — PROGRESS NOTES
Medication History Review by Pharmacist:    Comments/Recommendations:   Intervew at ED OFL2    Prior to Admission Medications:   No current facility-administered medications on file prior to encounter.     Current Outpatient Medications on File Prior to Encounter   Medication Sig Dispense Refill    insulin detemir (LEVEMIR FLEXPEN) 100 UNIT/ML injection pen Inject 32 Units into the skin nightly      chlorthalidone (HYGROTON) 25 MG tablet Take 0.5 tablets by mouth daily      empagliflozin (JARDIANCE) 25 MG tablet Take 1 tablet by mouth daily      insulin lispro, 1 Unit Dial, (HUMALOG KWIKPEN) 100 UNIT/ML SOPN Inject 6 Units into the skin 3 times daily      isosorbide mononitrate (IMDUR) 60 MG extended release tablet Take 1 tablet by mouth daily      potassium chloride (KLOR-CON M) 20 MEQ extended release tablet Take 1 tablet by mouth nightly      valACYclovir (VALTREX) 500 MG tablet Take 1 tablet by mouth 2 times daily      tacrolimus (PROGRAF) 0.5 MG capsule Take 1 capsule by mouth 2 times daily      predniSONE (DELTASONE) 5 MG tablet Take 1 tablet by mouth daily Indications: kideny transplant      azaTHIOprine (IMURAN) 50 MG tablet Take 2 tablets by mouth daily Indications: kideny transplan      Biotin 1 MG CAPS Take 1 capsule by mouth Daily      NONFORMULARY Hair vitamin daily      aspirin 81 MG EC tablet Take 1 tablet by mouth daily      atorvastatin (LIPITOR) 40 MG tablet Take 1 tablet by mouth daily      insulin detemir (LEVEMIR FLEXTOUCH) 100 UNIT/ML injection pen 40 Units daily      nitroGLYCERIN (NITROSTAT) 0.3 MG SL tablet Place 1 tablet under the tongue every 5 minutes as needed for Chest pain ceived the following from [facility]: Outside name: nitroglycerin (NITROSTAT) 0.4 mg SL tablet      [DISCONTINUED] lidocaine-prilocaine (EMLA) 2.5-2.5 % cream ceived the following from [facility]: Outside name: lidocaine-prilocaine (EMLA) topical cream      [DISCONTINUED] loratadine

## 2024-02-14 NOTE — ED TRIAGE NOTES
Patient arrived ambulatory via POV with c/c generalized body itching that started around 7pm last night. Reports new meds started a few weeks ago. Patient also reports dark brown urine that started last night. Denies n/v/d.  Patient reports med compliance     Hx kidney transplant

## 2024-02-15 ENCOUNTER — APPOINTMENT (OUTPATIENT)
Facility: HOSPITAL | Age: 69
End: 2024-02-15
Payer: MEDICARE

## 2024-02-15 ENCOUNTER — HOSPITAL ENCOUNTER (INPATIENT)
Facility: HOSPITAL | Age: 69
End: 2024-02-15
Payer: MEDICARE

## 2024-02-15 PROBLEM — E83.39 HYPOPHOSPHATEMIA: Status: ACTIVE | Noted: 2024-02-15

## 2024-02-15 LAB
-: NORMAL
ALBUMIN SERPL-MCNC: 2.9 G/DL (ref 3.5–5)
ALBUMIN/GLOB SERPL: 0.9 (ref 1.1–2.2)
ALP SERPL-CCNC: 328 U/L (ref 45–117)
ALT SERPL-CCNC: 209 U/L (ref 12–78)
ANION GAP SERPL CALC-SCNC: 5 MMOL/L (ref 5–15)
AST SERPL-CCNC: 169 U/L (ref 15–37)
BACTERIA SPEC CULT: NORMAL
BILIRUB DIRECT SERPL-MCNC: 3.2 MG/DL (ref 0–0.2)
BILIRUB INDIRECT SERPL-MCNC: 1.9 MG/DL (ref 0–1.1)
BILIRUB SERPL-MCNC: 4.7 MG/DL (ref 0.2–1)
BILIRUB SERPL-MCNC: 5.1 MG/DL (ref 0.2–1)
BUN SERPL-MCNC: 27 MG/DL (ref 6–20)
BUN/CREAT SERPL: 20 (ref 12–20)
CALCIUM SERPL-MCNC: 9.3 MG/DL (ref 8.5–10.1)
CEA SERPL-MCNC: 4.6 NG/ML
CHLORIDE SERPL-SCNC: 110 MMOL/L (ref 97–108)
CO2 SERPL-SCNC: 27 MMOL/L (ref 21–32)
CREAT SERPL-MCNC: 1.33 MG/DL (ref 0.55–1.02)
ERYTHROCYTE [DISTWIDTH] IN BLOOD BY AUTOMATED COUNT: 15.9 % (ref 11.5–14.5)
GLOBULIN SER CALC-MCNC: 3.1 G/DL (ref 2–4)
GLUCOSE BLD STRIP.AUTO-MCNC: 114 MG/DL (ref 65–117)
GLUCOSE BLD STRIP.AUTO-MCNC: 123 MG/DL (ref 65–117)
GLUCOSE BLD STRIP.AUTO-MCNC: 150 MG/DL (ref 65–117)
GLUCOSE BLD STRIP.AUTO-MCNC: 386 MG/DL (ref 65–117)
GLUCOSE SERPL-MCNC: 118 MG/DL (ref 65–100)
HAPTOGLOB SERPL-MCNC: 250 MG/DL (ref 30–200)
HAV IGM SER QL: NONREACTIVE
HBV CORE IGM SER QL: NONREACTIVE
HBV SURFACE AG SER QL: <0.1 INDEX
HBV SURFACE AG SER QL: NEGATIVE
HCT VFR BLD AUTO: 31.6 % (ref 35–47)
HCV AB SER IA-ACNC: 0.15 INDEX
HCV AB SERPL QL IA: NONREACTIVE
HGB BLD-MCNC: 10.3 G/DL (ref 11.5–16)
MAGNESIUM SERPL-MCNC: 1.8 MG/DL (ref 1.6–2.4)
MCH RBC QN AUTO: 33 PG (ref 26–34)
MCHC RBC AUTO-ENTMCNC: 32.6 G/DL (ref 30–36.5)
MCV RBC AUTO: 101.3 FL (ref 80–99)
MITOCHONDRIA M2 IGG SER-ACNC: <20 UNITS (ref 0–20)
NRBC # BLD: 0 K/UL (ref 0–0.01)
NRBC BLD-RTO: 0 PER 100 WBC
PHOSPHATE SERPL-MCNC: 1.4 MG/DL (ref 2.6–4.7)
PLATELET # BLD AUTO: 179 K/UL (ref 150–400)
PMV BLD AUTO: 11.9 FL (ref 8.9–12.9)
POTASSIUM SERPL-SCNC: 2.9 MMOL/L (ref 3.5–5.1)
PROT SERPL-MCNC: 6 G/DL (ref 6.4–8.2)
RBC # BLD AUTO: 3.12 M/UL (ref 3.8–5.2)
SERVICE CMNT-IMP: ABNORMAL
SERVICE CMNT-IMP: NORMAL
SERVICE CMNT-IMP: NORMAL
SMA IGG SER-ACNC: 7 UNITS (ref 0–19)
SODIUM SERPL-SCNC: 142 MMOL/L (ref 136–145)
TACROLIMUS BLOOD: 6.6 NG/ML
WBC # BLD AUTO: 7.8 K/UL (ref 3.6–11)

## 2024-02-15 PROCEDURE — A9579 GAD-BASE MR CONTRAST NOS,1ML: HCPCS | Performed by: RADIOLOGY

## 2024-02-15 PROCEDURE — 1100000000 HC RM PRIVATE

## 2024-02-15 PROCEDURE — 82248 BILIRUBIN DIRECT: CPT

## 2024-02-15 PROCEDURE — 6370000000 HC RX 637 (ALT 250 FOR IP): Performed by: INTERNAL MEDICINE

## 2024-02-15 PROCEDURE — 97530 THERAPEUTIC ACTIVITIES: CPT

## 2024-02-15 PROCEDURE — A9537 TC99M MEBROFENIN: HCPCS

## 2024-02-15 PROCEDURE — 6360000004 HC RX CONTRAST MEDICATION: Performed by: RADIOLOGY

## 2024-02-15 PROCEDURE — 6360000002 HC RX W HCPCS: Performed by: RADIOLOGY

## 2024-02-15 PROCEDURE — 2580000003 HC RX 258: Performed by: INTERNAL MEDICINE

## 2024-02-15 PROCEDURE — 84100 ASSAY OF PHOSPHORUS: CPT

## 2024-02-15 PROCEDURE — 82390 ASSAY OF CERULOPLASMIN: CPT

## 2024-02-15 PROCEDURE — 85027 COMPLETE CBC AUTOMATED: CPT

## 2024-02-15 PROCEDURE — 6360000002 HC RX W HCPCS: Performed by: INTERNAL MEDICINE

## 2024-02-15 PROCEDURE — 36415 COLL VENOUS BLD VENIPUNCTURE: CPT

## 2024-02-15 PROCEDURE — 80053 COMPREHEN METABOLIC PANEL: CPT

## 2024-02-15 PROCEDURE — 82103 ALPHA-1-ANTITRYPSIN TOTAL: CPT

## 2024-02-15 PROCEDURE — 80197 ASSAY OF TACROLIMUS: CPT

## 2024-02-15 PROCEDURE — 3430000000 HC RX DIAGNOSTIC RADIOPHARMACEUTICAL

## 2024-02-15 PROCEDURE — 74183 MRI ABD W/O CNTR FLWD CNTR: CPT

## 2024-02-15 PROCEDURE — 82247 BILIRUBIN TOTAL: CPT

## 2024-02-15 PROCEDURE — 94761 N-INVAS EAR/PLS OXIMETRY MLT: CPT

## 2024-02-15 PROCEDURE — 82962 GLUCOSE BLOOD TEST: CPT

## 2024-02-15 PROCEDURE — 97161 PT EVAL LOW COMPLEX 20 MIN: CPT

## 2024-02-15 PROCEDURE — 83735 ASSAY OF MAGNESIUM: CPT

## 2024-02-15 PROCEDURE — 78227 HEPATOBIL SYST IMAGE W/DRUG: CPT

## 2024-02-15 RX ORDER — KIT FOR THE PREPARATION OF TECHNETIUM TC 99M MEBROFENIN 45 MG/10ML
6 INJECTION, POWDER, LYOPHILIZED, FOR SOLUTION INTRAVENOUS ONCE
Status: COMPLETED | OUTPATIENT
Start: 2024-02-15 | End: 2024-02-15

## 2024-02-15 RX ORDER — CARVEDILOL 25 MG/1
25 TABLET ORAL 2 TIMES DAILY WITH MEALS
Qty: 60 TABLET | Refills: 3 | Status: SHIPPED | OUTPATIENT
Start: 2024-02-15

## 2024-02-15 RX ORDER — POTASSIUM CHLORIDE 750 MG/1
40 TABLET, FILM COATED, EXTENDED RELEASE ORAL DAILY
Status: DISCONTINUED | OUTPATIENT
Start: 2024-02-15 | End: 2024-02-17 | Stop reason: HOSPADM

## 2024-02-15 RX ORDER — AMLODIPINE BESYLATE 10 MG/1
10 TABLET ORAL DAILY
Qty: 30 TABLET | Refills: 3 | Status: SHIPPED | OUTPATIENT
Start: 2024-02-16

## 2024-02-15 RX ORDER — DEXTROSE MONOHYDRATE, SODIUM CHLORIDE, AND POTASSIUM CHLORIDE 50; 2.98; 4.5 G/1000ML; G/1000ML; G/1000ML
INJECTION, SOLUTION INTRAVENOUS CONTINUOUS
Status: DISCONTINUED | OUTPATIENT
Start: 2024-02-15 | End: 2024-02-15

## 2024-02-15 RX ORDER — MORPHINE SULFATE 2 MG/ML
2 INJECTION, SOLUTION INTRAMUSCULAR; INTRAVENOUS ONCE
Status: COMPLETED | OUTPATIENT
Start: 2024-02-15 | End: 2024-02-15

## 2024-02-15 RX ORDER — AMLODIPINE BESYLATE 5 MG/1
10 TABLET ORAL DAILY
Status: DISCONTINUED | OUTPATIENT
Start: 2024-02-16 | End: 2024-02-17 | Stop reason: HOSPADM

## 2024-02-15 RX ORDER — AMLODIPINE BESYLATE 5 MG/1
5 TABLET ORAL DAILY
Status: DISCONTINUED | OUTPATIENT
Start: 2024-02-15 | End: 2024-02-15

## 2024-02-15 RX ADMIN — TACROLIMUS 0.5 MG: 0.5 CAPSULE ORAL at 22:26

## 2024-02-15 RX ADMIN — Medication 250 MG: at 22:27

## 2024-02-15 RX ADMIN — GADOTERIDOL 18 ML: 279.3 INJECTION, SOLUTION INTRAVENOUS at 22:00

## 2024-02-15 RX ADMIN — INSULIN LISPRO 4 UNITS: 100 INJECTION, SOLUTION INTRAVENOUS; SUBCUTANEOUS at 22:29

## 2024-02-15 RX ADMIN — CARVEDILOL 25 MG: 12.5 TABLET, FILM COATED ORAL at 09:46

## 2024-02-15 RX ADMIN — TACROLIMUS 0.5 MG: 0.5 CAPSULE ORAL at 09:47

## 2024-02-15 RX ADMIN — MEBROFENIN 5.5 MILLICURIE: 45 INJECTION, POWDER, LYOPHILIZED, FOR SOLUTION INTRAVENOUS at 14:14

## 2024-02-15 RX ADMIN — POTASSIUM CHLORIDE 40 MEQ: 750 TABLET, FILM COATED, EXTENDED RELEASE ORAL at 09:47

## 2024-02-15 RX ADMIN — SODIUM CHLORIDE: 9 INJECTION, SOLUTION INTRAVENOUS at 06:59

## 2024-02-15 RX ADMIN — DIPHENHYDRAMINE HYDROCHLORIDE 25 MG: 25 CAPSULE ORAL at 20:00

## 2024-02-15 RX ADMIN — CARVEDILOL 25 MG: 12.5 TABLET, FILM COATED ORAL at 17:07

## 2024-02-15 RX ADMIN — Medication 250 MG: at 08:11

## 2024-02-15 RX ADMIN — Medication 250 MG: at 17:07

## 2024-02-15 RX ADMIN — PREDNISONE 5 MG: 5 TABLET ORAL at 09:47

## 2024-02-15 RX ADMIN — SODIUM CHLORIDE, PRESERVATIVE FREE 10 ML: 5 INJECTION INTRAVENOUS at 22:29

## 2024-02-15 RX ADMIN — INSULIN GLARGINE 30 UNITS: 100 INJECTION, SOLUTION SUBCUTANEOUS at 22:29

## 2024-02-15 RX ADMIN — ISOSORBIDE MONONITRATE 60 MG: 30 TABLET, EXTENDED RELEASE ORAL at 09:47

## 2024-02-15 RX ADMIN — ENOXAPARIN SODIUM 40 MG: 100 INJECTION SUBCUTANEOUS at 09:48

## 2024-02-15 RX ADMIN — AMLODIPINE BESYLATE 5 MG: 5 TABLET ORAL at 09:47

## 2024-02-15 RX ADMIN — DIPHENHYDRAMINE HYDROCHLORIDE 25 MG: 25 CAPSULE ORAL at 00:50

## 2024-02-15 RX ADMIN — MORPHINE SULFATE 2 MG: 2 INJECTION, SOLUTION INTRAMUSCULAR; INTRAVENOUS at 15:28

## 2024-02-15 RX ADMIN — SODIUM CHLORIDE, PRESERVATIVE FREE 10 ML: 5 INJECTION INTRAVENOUS at 17:07

## 2024-02-15 ASSESSMENT — PAIN SCALES - GENERAL
PAINLEVEL_OUTOF10: 0

## 2024-02-15 NOTE — DISCHARGE INSTRUCTIONS
HOSPITALIST DISCHARGE INSTRUCTIONS  NAME:  Florina Herrera   :  1955   MRN:  [id number]     Date/Time:  2/15/2024 1:07 PM    ADMIT DATE: 2024     DISCHARGE DATE: 2024     DISCHARGE DIAGNOSIS:  Acute hepatitis     DISCHARGE INSTRUCTIONS:  Thank you for allowing us to participate in your care. Your discharging Hospitalist is Sridhar Guerrero MD. You were admitted for evaluation and treatment of the following:    Acute hepatitis  Elevated bilirubin - We found this on blood work. We do not know the cause for certain, but we suspect it is either due to azathioprine, or perhaps due to an obstruction of your bile duct.  We consulted GI.  They did an endoscopy and found a tight region where you bile duct meets your intestine.  They stretched it out. The hepatitis got better after that procedure and after holding off giving azathioprine. You should continue to hold off taking azathioprine for now, and follow up with a liver specialist if this problem occurs again in the future     Renal transplant recipient / Immunosuppression Chronic kidney disease), stage III - You kidney function is stable.  We consulted nephrology.  You should continue prednisone and tacrolimus, but stop azathioprine.  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  Continue home Levimir and jardiance.      Coronary artery disease / Hypertension - This is stable, continue coreg and IMDUR.  Stop chlorthalidone and start Norvasc, per nephrology.  Hold atorvastatin.     Anemia - Noted after hydration, but stable.     Obesity  - We advise weight loss.    MEDICATIONS:    It is important that you take the medication exactly as they are prescribed.   Keep your medication in the bottles provided by the pharmacist and keep a list of the medication names, dosages, and times to be taken in your wallet.   Do not take other medications without consulting your  doctor.     If you experience any of the following symptoms then please call your primary care physician or return to the emergency room if you cannot get hold of your doctor:  Fever, chills, nausea, vomiting, diarrhea, change in mentation, falling, bleeding, shortness of breath    Follow Up:  Please call the below provider to arrange hospital follow up appointment      Jim Hendricks MD    Schedule an appointment as soon as possible for a visit in 1 week(s)      Ruth Lovelace MD  [address] [facility] [address] [phone]    Schedule an appointment as soon as possible for a visit in 1 day(s)        For questions regarding your Hospitalization or to contact the Hospital Medicine team, please call [phone].      Information obtained by :  I understand that if any problems occur once I am at home I am to contact my physician.    I understand and acknowledge receipt of the instructions indicated above.                                                                                                                                           Physician's or R.N.'s Signature                                                                  Date/Time                                                                                                                                              Patient or Representative Signature                                                          Date/Time

## 2024-02-15 NOTE — PROGRESS NOTES
1520  Called by Nuclear medicine to administer Morphine 2 mg with HIDA scan. Patient identified with 2 identifiers and reviewed allergies. Morphine obtained from [facility] in ER and Morphine 2 mg IVP given in left a/c. Patient counseled on possible side affects.

## 2024-02-15 NOTE — PROGRESS NOTES
NEPHROLOGY CONSULT NOTE     Patient: Florina Herrera MRN: [id number]  PCP: Jim Hendricks MD   :     1955  Age:   68 y.o.  Sex:  female      Referring physician: Sridhar Guerrero MD  Reason for consultation:   Admission Date: 2024  8:06 AM  LOS: 1 day        ASSESSMENT and PLAN :   S/p DDKT 2020 at [facility]  Baseline graft function is 1.3-1.4  Generalized itching with elevated LFTs and hyperbilirubinemia  Hypertension  Chronic hypokalemia  Diabetes mellitus type 2    Plan:  Cr/GFR at baseline  Give potassium chloride 40 meq po x1  Continue tacrolimus 0.5 twice daily and prednisone 5 mg once daily, imuran on hold  Imuran tends to do hepatotoxicity (hepatic sinusoidal obstruction syndrome)  CT scan of abdomen and pelvis reviewed.  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 Authorizing Provider   insulin detemir (LEVEMIR FLEXPEN) 100 UNIT/ML injection pen Inject 32 Units into the skin nightly   Yes Provider, MD Luisito   chlorthalidone (HYGROTON) 25 MG tablet Take 0.5 tablets by mouth daily   Yes Provider

## 2024-02-15 NOTE — PROGRESS NOTES
Occupational therapy note:  Orders received, chart reviewed. Spoke with RN and PT and both report patient at independent baseline level and with no current acute care OT needs. Will complete orders.  Sapna Hitchcock MS OTR/L

## 2024-02-15 NOTE — PROGRESS NOTES
[facility]    Hospitalist Progress Note    NAME: Florina Herrera   :  1955  MRM:  [id number]    Date/Time of service 2/15/2024  7:56 AM    To assist coordination of care and communication with nursing and staff, this note may be preliminary early in the day, but finalized by end of the day.        Assessment and Plan:     Acute hepatitis / LFT elevation / Hyperbilirubinemia - POA, unclear etiology. Labs better after hydration. Bilirubin is mostly direct.  Imaging and lack of symptoms makes gallstone unlikely.  Potentially offending meds include azathioprine and statin.  Consulted GI.  Checking broad serologies, viral, etc, but negative so far.     Renal transplant recipient / Immunosuppression due to chronic steroid use / CKD (chronic kidney disease), stage III / Hypophosphatemia / Hypokalemia - POA, unclear recent Cr baseline, no records in our system.  Consulted renal.  Continue prednisone, tacrolimus for now.  Hold azathioprine and valacyclovir.  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  SSI per protocol.  Continue home Lantus, hold jardiance. Check A1c.      CAD (coronary artery disease) / Hypertension - POA, and stable.  Hold ASA in case Bx needed.  Continue coreg and IMDUR.  Hold chlorthalidone until renal weighs in.  Hold atorvastatin due to hepatitis.     Anemia - Mild and only noted after hydration.  Macrocytic.  Serologies normal.    Obesity (BMI 30.0-34.9) - Advise weight loss.       Subjective:     Chief Complaint:  better    ROS:  (bold if positive, if negative)    Tolerating PT  Tolerating Diet        Objective:     Last 24hrs VS reviewed since prior progress note. Most recent are:    Vitals:    24 1837 24 2019 02/15/24 0045 02/15/24 0407   BP: (!) 165/90 (!) 178/84 (!) 166/71 (!) 169/72   Pulse: 78 72 81 76   Resp: 18 17 16 15   Temp: 98.1 °F (36.7 °C) 97.9 °F (36.6 °C) 97.7 °F (36.5 °C) 98 °F (36.7  Continuous PRN    sodium chloride flush 0.9 % injection 5-40 mL  5-40 mL IntraVENous 2 times per day    sodium chloride flush 0.9 % injection 5-40 mL  5-40 mL IntraVENous PRN    0.9 % sodium chloride infusion   IntraVENous PRN    [Held by provider] ondansetron (ZOFRAN-ODT) disintegrating tablet 4 mg  4 mg Oral Q8H PRN    Or    [Held by provider] ondansetron (ZOFRAN) injection 4 mg  4 mg IntraVENous Q6H PRN    polyethylene glycol (GLYCOLAX) packet 17 g  17 g Oral Daily PRN    acetaminophen (TYLENOL) tablet 650 mg  650 mg Oral Q6H PRN    Or    acetaminophen (TYLENOL) suppository 650 mg  650 mg Rectal Q6H PRN    enoxaparin (LOVENOX) injection 40 mg  40 mg SubCUTAneous Daily    insulin lispro (HUMALOG) injection vial 0-8 Units  0-8 Units SubCUTAneous TID WC    insulin lispro (HUMALOG) injection vial 0-4 Units  0-4 Units SubCUTAneous Nightly    diphenhydrAMINE (BENADRYL) capsule 25 mg  25 mg Oral Q6H PRN    isosorbide mononitrate (IMDUR) extended release tablet 60 mg  60 mg Oral Daily        Lab Data Reviewed: (see below)  Lab Review:     Recent Labs     02/14/24  0823 02/15/24  0238   WBC 9.1 7.8   HGB 11.5 10.3*   HCT 35.4 31.6*    179     Recent Labs     02/14/24  0823 02/14/24  1215 02/15/24  0238     --  142   K 3.1*  --  2.9*     --  110*   CO2 28  --  27   GLUCOSE 292*  --  118*   BUN 28*  --  27*   CREATININE 1.66*  --  1.33*   CALCIUM 10.4* 9.8 9.3   MG  --  2.0 1.8   PHOS  --  2.6 1.4*   LABALBU 3.4*  --  2.9*   *  --  169*   *  --  209*     Lab Results   Component Value Date/Time    GLUCOSE 118 02/15/2024 02:38 AM    GLUCOSE 292 02/14/2024 08:23 AM     No results for input(s): \"PH\", \"PCO2\", \"PO2\", \"HCO3\", \"FIO2\" in the last 72 hours.  No results for input(s): \"INR\" in the last 72 hours.  Results       Procedure Component Value Units Date/Time    Respiratory Panel, Molecular, with COVID-19 (Restricted: peds pts or suitable admitted adults) [[id number]] Collected: 02/14/24 1218

## 2024-02-15 NOTE — PLAN OF CARE
Problem: Safety - Adult  Goal: Free from fall injury  2/14/2024 2332 by Audrey Hargrove, RN  Outcome: Progressing  2/14/2024 1849 by Marjorie Lane, RN  Outcome: Progressing     Problem: Pain  Goal: Verbalizes/displays adequate comfort level or baseline comfort level  Outcome: Progressing

## 2024-02-15 NOTE — DISCHARGE SUMMARY
Physician Discharge Summary     Patient ID:  Florina Herrera  [id number]  68 y.o.  1955    Admit date: 2/14/2024    Discharge date of service and time: 2/17/2024  Greater than 30 minutes were spent providing discharge related services for this patient    Admission Diagnoses: Cholestasis [K83.1]  Pruritus [L29.9]  Acute hepatitis [B17.9]  Elevated liver enzymes [R74.8]  Elevated bilirubin [R17]    Discharge Diagnoses:    Principal Diagnosis   Acute hepatitis                                             Hospital Course and other diagnoses  Acute hepatitis / LFT elevation / Hyperbilirubinemia - POA, unclear etiology. Labs better again today after hydration. Bilirubin is mostly direct.  HIDA showed no BG filling.  MRCP with contracted gallbladder, question of CBD filling defect.  ERCP suggested ampulla stenosis that was dilated. Potentially offending meds include azathioprine and statin.  Appreciate work by GI.   Checking broad serologies, viral, etc.  elevated but of unclear significance.      Renal transplant recipient / Immunosuppression due to chronic steroid use / CKD (chronic kidney disease), stage III / Hypophosphatemia / Hypokalemia - POA, unclear recent Cr baseline, no records in our system.  Consulted renal.  Continue prednisone, tacrolimus for now.  Hold azathioprine and valacyclovir.  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  SSI per protocol.  Continue home Lantus, hold jardiance. Check A1c.      CAD (coronary artery disease) / Hypertension - POA, and stable.  Hold ASA in case Bx needed.  Continue coreg and IMDUR.  Hold chlorthalidone until renal weighs in.  Hold atorvastatin due to hepatitis.     Anemia - Mild and only noted after hydration.  Macrocytic.  Serologies normal.    Obesity (BMI 30.0-34.9) - Advise weight loss.     PCP: Jim Hendricks MD    Consults: GI and nephrology    Significant Diagnostic Studies: See Hospital

## 2024-02-15 NOTE — PROGRESS NOTES
Katherine Mata PA-C                       [phone] cell              Monday-Friday 8:00 am-4:30 pm       Gastroenterology Progress Note    February 15, 2024  Admit Date: 2/14/2024         Narrative Assessment and Plan   68YOF s/p kidney transplant on tacrolimus, azathioprine, and prednisone, presenting with itching, dark colored urine, and elevated LFTs. Denies heavy alcohol or tylenol use, recent tattoos or IV drug use, known chronic liver disease. She had abd pain, N/V 2 days ago but none now. No evidence of CBD dilation on CT imaging. Gallbladder is contracted but has cholecystic inflammation on CT.  Hepatitis panel negative. Hyperbilirubinemia is more direct than indirect, decreasing concern for hemolysis. Iron study not consistent with hemochromatosis. Other serologies currently in process: AMA, ASMA, EBV, CMV, AFP, PEPE, and ANCA panel. HIDA scan pending.      Impression:  Elevated LFTs  Hyperbilirubinemia   Pericholecystic inflammation on CT  S/p kidney transplant  Chronic immunosuppression   Diabetes  HTN     Plan:   HIDA scan ordered for further evaluation of gallbladder functioning and drainage.   Laboratory workup so far including hepatitis panel and iron panel negative for causes of this acute LFT elevation, more tests in process.  Will follow for results.     Subjective:     Chief Complaint: elevated LFTs      History of Present Illness: Patient is a 68 y.o. female with a history of kidney transplant in December 2020 on prednisone, tacrolimus, and azathioprine, diabetes, and hypertension, admitted on 2/14/2024 with elevated LFTs. Patient reports associated tea-colored urine and pruritis, but denies any abdominal pain, fevers, change in bowel habits, weight loss, or bleeding. She had abdominal pain, nausea, and vomiting 2 days ago which she attributes to something she ate, but no more since then.   glycol (GLYCOLAX) packet 17 g  17 g Oral Daily PRN    acetaminophen (TYLENOL) tablet 650 mg  650 mg Oral Q6H PRN    Or    acetaminophen (TYLENOL) suppository 650 mg  650 mg Rectal Q6H PRN    enoxaparin (LOVENOX) injection 40 mg  40 mg SubCUTAneous Daily    insulin lispro (HUMALOG) injection vial 0-8 Units  0-8 Units SubCUTAneous TID WC    insulin lispro (HUMALOG) injection vial 0-4 Units  0-4 Units SubCUTAneous Nightly    diphenhydrAMINE (BENADRYL) capsule 25 mg  25 mg Oral Q6H PRN    isosorbide mononitrate (IMDUR) extended release tablet 60 mg  60 mg Oral Daily       Objective:     VITALS:   Last 24hrs VS reviewed since prior progress note. Most recent are:  Vitals:    02/15/24 1105   BP: (!) 195/95   Pulse:    Resp:    Temp:    SpO2:      Temp (24hrs), Av.3 °F (36.8 °C), Min:97.7 °F (36.5 °C), Max:99.1 °F (37.3 °C)      Intake/Output Summary (Last 24 hours) at 2/15/2024 1153  Last data filed at 2/15/2024 0843  Gross per 24 hour   Intake 1392 ml   Output --   Net 1392 ml       EXAM:  General:  Comfortable, no distress    HEENT:  Atraumatic skull, pupils equal. Mild scleral icterus.   Lungs:   No increased work of breathing or cough. No abnormal audible breath sounds. Speaking in complete sentences  Heart:   No abnormal audible heart sounds.  Well perfused  Abdomen:  Non-distended, non-tender.  No mass, guarding or rebound  Musc:   No skeletal defects or deformities  Neurologic:   Cranial nerves grossly intact, moves all 4 extremities  Psych:    Good insight. Not anxious nor agitated  Derm:   No rash, jaundice, nor palpable dermatologic mass    Lab Data Reviewed:   Recent Labs     24  0823 02/15/24  0238   WBC 9.1 7.8   HGB 11.5 10.3*   HCT 35.4 31.6*    179     Recent Labs     24  0823 24  1215 02/15/24  0238     --  142   K 3.1*  --  2.9*     --  110*   CO2 28  --  27   BUN 28*  --  27*   MG  --  2.0 1.8   PHOS  --  2.6 1.4*   *  --  209*     No results found for:

## 2024-02-15 NOTE — PROGRESS NOTES
PHYSICAL THERAPY EVALUATION/DISCHARGE    Patient: Florina Herrera (68 y.o. female)  Date: 2/15/2024  Primary Diagnosis: Cholestasis [K83.1]  Pruritus [L29.9]  Acute hepatitis [B17.9]  Elevated liver enzymes [R74.8]  Elevated bilirubin [R17]       Precautions: NPO, Fall Risk                      ASSESSMENT AND RECOMMENDATIONS:  Based on the objective data below, the patient admitted with c/o itching found to have acute hepatitis. Patient reports baseline independent prior level of function without use of assistive device. Patient able to demonstrate functional mobility status likely at/near baseline level of function mobilizing in acute care setting without assistive device into hallway independently. She navigated up/down stairs safely with her baseline technique. Patient denied symptoms throughout session and denies concerns with plan to return home at discharge. At this time, no further skilled PT needs identified.     Functional Outcome Measure:  The patient scored 23/24 on the [facility] outcome measure.          Further skilled acute physical therapy is not indicated at this time.       PLAN :  Recommendation for discharge: (in order for the patient to meet his/her long term goals): No skilled physical therapy    Other factors to consider for discharge: no additional factors    IF patient discharges home will need the following DME: none       SUBJECTIVE:   Patient stated “I can show you how I walk.”    OBJECTIVE DATA SUMMARY:     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)     Obesity (BMI 30.0-34.9)     Renal transplant recipient      Past Surgical History:   Procedure Laterality Date    CYST INCISION AND DRAINAGE      GYN      c section    IR TUNNELED                                                                                                                                   Pain Rating:  None reported   Pain Intervention(s):       Activity Tolerance:   Good    After treatment:   Patient left in no apparent distress in bed, Call bell within reach, Side rails x3, and nursing student present      COMMUNICATION/EDUCATION:   The patient's plan of care was discussed with: occupational therapist and registered nurse    Patient Education  Education Given To: Patient  Education Provided: Role of Therapy;Plan of Care  Education Method: Verbal  Barriers to Learning: None  Education Outcome: Verbalized understanding    Thank you for this referral.  Nnacy Baldwin, PT  Minutes: 28      Physical Therapy Evaluation Charge Determination   History Examination Presentation Decision-Making   MEDIUM  Complexity : 1-2 comorbidities / personal factors will impact the outcome/ POC  LOW Complexity : 1-2 Standardized tests and measures addressing body structure, function, activity limitation and / or participation in recreation  LOW Complexity : Stable, uncomplicated  AM-PAC  LOW      Based on the above components, the patient evaluation is determined to be of the following complexity level: Low

## 2024-02-15 NOTE — CARE COORDINATION
2/15/2024  1:35 PM     02/15/24 1334   Service Assessment   Patient Orientation Alert and Oriented   Discharge Planning   Patient expects to be discharged to: House     2/15/2024  1:36 PM      ICD-10-CM    1. Elevated liver enzymes  R74.8       2. Elevated bilirubin  R17       3. Pruritus  L29.9       4. Cholestasis  K83.1             General Risk Score: 4   Values used to calculate this score:    Points  Metrics       1        Age: 68       1        Hospital Admissions: 1       0        ED Visits: 0       0        Has Chronic Obstructive Pulmonary Disease: No       1        Has Diabetes: Yes       0        Has Congestive Heart Failure: No       1        Has Liver Disease: Yes       0        Has Depression: No       0        Current PCP: Jim Hendricks MD       0        Has Medicaid: No      CM reviewed EMR. No CM needs indicated at this time. PT,OT orders completed as pt is at baseline Providers, if needs arise, please consult case management.   ROMA Ku

## 2024-02-16 ENCOUNTER — APPOINTMENT (OUTPATIENT)
Facility: HOSPITAL | Age: 69
End: 2024-02-16
Payer: MEDICARE

## 2024-02-16 ENCOUNTER — ANESTHESIA EVENT (OUTPATIENT)
Facility: HOSPITAL | Age: 69
DRG: 442 | End: 2024-02-16
Payer: MEDICARE

## 2024-02-16 ENCOUNTER — ANESTHESIA (OUTPATIENT)
Facility: HOSPITAL | Age: 69
DRG: 442 | End: 2024-02-16
Payer: MEDICARE

## 2024-02-16 LAB
AFP-TM SERPL-MCNC: 7 NG/ML (ref 0–9.2)
ALBUMIN SERPL-MCNC: 3.1 G/DL (ref 3.5–5)
ALBUMIN/GLOB SERPL: 0.8 (ref 1.1–2.2)
ALP SERPL-CCNC: 415 U/L (ref 45–117)
ALT SERPL-CCNC: 166 U/L (ref 12–78)
ANA TITR SER IF: NEGATIVE
ANION GAP SERPL CALC-SCNC: 4 MMOL/L (ref 5–15)
AST SERPL-CCNC: 103 U/L (ref 15–37)
BILIRUB SERPL-MCNC: 2.5 MG/DL (ref 0.2–1)
BUN SERPL-MCNC: 18 MG/DL (ref 6–20)
BUN/CREAT SERPL: 14 (ref 12–20)
C-ANCA TITR SER IF: NORMAL TITER
CALCIUM SERPL-MCNC: 8.7 MG/DL (ref 8.5–10.1)
CANCER AG19-9 SERPL-ACNC: 155 U/ML (ref 0–35)
CHLORIDE SERPL-SCNC: 108 MMOL/L (ref 97–108)
CO2 SERPL-SCNC: 27 MMOL/L (ref 21–32)
CREAT SERPL-MCNC: 1.25 MG/DL (ref 0.55–1.02)
GLOBULIN SER CALC-MCNC: 3.7 G/DL (ref 2–4)
GLUCOSE BLD STRIP.AUTO-MCNC: 176 MG/DL (ref 65–117)
GLUCOSE BLD STRIP.AUTO-MCNC: 246 MG/DL (ref 65–117)
GLUCOSE BLD STRIP.AUTO-MCNC: 265 MG/DL (ref 65–117)
GLUCOSE SERPL-MCNC: 240 MG/DL (ref 65–100)
INR PPP: 1 (ref 0.9–1.1)
LABORATORY COMMENT REPORT: NORMAL
MYELOPEROXIDASE AB SER IA-ACNC: <0.2 UNITS (ref 0–0.9)
P-ANCA ATYPICAL TITR SER IF: NORMAL TITER
P-ANCA TITR SER IF: NORMAL TITER
POTASSIUM SERPL-SCNC: 3.5 MMOL/L (ref 3.5–5.1)
PROT SERPL-MCNC: 6.8 G/DL (ref 6.4–8.2)
PROTEINASE3 AB SER IA-ACNC: <0.2 UNITS (ref 0–0.9)
PROTHROMBIN TIME: 10.5 SEC (ref 9–11.1)
SERVICE CMNT-IMP: ABNORMAL
SODIUM SERPL-SCNC: 139 MMOL/L (ref 136–145)
TACROLIMUS BLOOD: 5.4 NG/ML

## 2024-02-16 PROCEDURE — 2720000010 HC SURG SUPPLY STERILE: Performed by: INTERNAL MEDICINE

## 2024-02-16 PROCEDURE — 6370000000 HC RX 637 (ALT 250 FOR IP)

## 2024-02-16 PROCEDURE — 6360000002 HC RX W HCPCS: Performed by: INTERNAL MEDICINE

## 2024-02-16 PROCEDURE — 0F7C8ZZ DILATION OF AMPULLA OF VATER, VIA NATURAL OR ARTIFICIAL OPENING ENDOSCOPIC: ICD-10-PCS | Performed by: INTERNAL MEDICINE

## 2024-02-16 PROCEDURE — 2500000003 HC RX 250 WO HCPCS: Performed by: NURSE ANESTHETIST, CERTIFIED REGISTERED

## 2024-02-16 PROCEDURE — 3600007513: Performed by: INTERNAL MEDICINE

## 2024-02-16 PROCEDURE — 1100000000 HC RM PRIVATE

## 2024-02-16 PROCEDURE — 6360000004 HC RX CONTRAST MEDICATION

## 2024-02-16 PROCEDURE — 3700000001 HC ADD 15 MINUTES (ANESTHESIA): Performed by: INTERNAL MEDICINE

## 2024-02-16 PROCEDURE — 6370000000 HC RX 637 (ALT 250 FOR IP): Performed by: INTERNAL MEDICINE

## 2024-02-16 PROCEDURE — 94761 N-INVAS EAR/PLS OXIMETRY MLT: CPT

## 2024-02-16 PROCEDURE — 7100000011 HC PHASE II RECOVERY - ADDTL 15 MIN: Performed by: INTERNAL MEDICINE

## 2024-02-16 PROCEDURE — 6360000002 HC RX W HCPCS: Performed by: NURSE ANESTHETIST, CERTIFIED REGISTERED

## 2024-02-16 PROCEDURE — 80053 COMPREHEN METABOLIC PANEL: CPT

## 2024-02-16 PROCEDURE — 36415 COLL VENOUS BLD VENIPUNCTURE: CPT

## 2024-02-16 PROCEDURE — 7100000010 HC PHASE II RECOVERY - FIRST 15 MIN: Performed by: INTERNAL MEDICINE

## 2024-02-16 PROCEDURE — 85610 PROTHROMBIN TIME: CPT

## 2024-02-16 PROCEDURE — 2580000003 HC RX 258: Performed by: INTERNAL MEDICINE

## 2024-02-16 PROCEDURE — 2580000003 HC RX 258: Performed by: NURSE ANESTHETIST, CERTIFIED REGISTERED

## 2024-02-16 PROCEDURE — 3600007503: Performed by: INTERNAL MEDICINE

## 2024-02-16 PROCEDURE — 82962 GLUCOSE BLOOD TEST: CPT

## 2024-02-16 PROCEDURE — 3700000000 HC ANESTHESIA ATTENDED CARE: Performed by: INTERNAL MEDICINE

## 2024-02-16 RX ORDER — DIPHENHYDRAMINE HCL 25 MG
25 CAPSULE ORAL ONCE
Status: COMPLETED | OUTPATIENT
Start: 2024-02-16 | End: 2024-02-16

## 2024-02-16 RX ORDER — SODIUM CHLORIDE, SODIUM LACTATE, POTASSIUM CHLORIDE, CALCIUM CHLORIDE 600; 310; 30; 20 MG/100ML; MG/100ML; MG/100ML; MG/100ML
INJECTION, SOLUTION INTRAVENOUS CONTINUOUS PRN
Status: DISCONTINUED | OUTPATIENT
Start: 2024-02-16 | End: 2024-02-16 | Stop reason: SDUPTHER

## 2024-02-16 RX ORDER — SODIUM CHLORIDE 9 MG/ML
INJECTION, SOLUTION INTRAVENOUS CONTINUOUS
Status: DISCONTINUED | OUTPATIENT
Start: 2024-02-16 | End: 2024-02-17 | Stop reason: HOSPADM

## 2024-02-16 RX ORDER — SUCCINYLCHOLINE CHLORIDE 20 MG/ML
INJECTION INTRAMUSCULAR; INTRAVENOUS PRN
Status: DISCONTINUED | OUTPATIENT
Start: 2024-02-16 | End: 2024-02-16 | Stop reason: SDUPTHER

## 2024-02-16 RX ORDER — ONDANSETRON 2 MG/ML
INJECTION INTRAMUSCULAR; INTRAVENOUS PRN
Status: DISCONTINUED | OUTPATIENT
Start: 2024-02-16 | End: 2024-02-16 | Stop reason: SDUPTHER

## 2024-02-16 RX ORDER — ROCURONIUM BROMIDE 10 MG/ML
INJECTION, SOLUTION INTRAVENOUS PRN
Status: DISCONTINUED | OUTPATIENT
Start: 2024-02-16 | End: 2024-02-16 | Stop reason: SDUPTHER

## 2024-02-16 RX ORDER — INDOMETHACIN 50 MG/1
100 SUPPOSITORY RECTAL ONCE
Status: DISCONTINUED | OUTPATIENT
Start: 2024-02-16 | End: 2024-02-17 | Stop reason: HOSPADM

## 2024-02-16 RX ORDER — PROPOFOL 10 MG/ML
INJECTION, EMULSION INTRAVENOUS PRN
Status: DISCONTINUED | OUTPATIENT
Start: 2024-02-16 | End: 2024-02-16 | Stop reason: SDUPTHER

## 2024-02-16 RX ORDER — DEXAMETHASONE SODIUM PHOSPHATE 4 MG/ML
INJECTION, SOLUTION INTRA-ARTICULAR; INTRALESIONAL; INTRAMUSCULAR; INTRAVENOUS; SOFT TISSUE PRN
Status: DISCONTINUED | OUTPATIENT
Start: 2024-02-16 | End: 2024-02-16 | Stop reason: SDUPTHER

## 2024-02-16 RX ORDER — INDOMETHACIN 50 MG/1
100 SUPPOSITORY RECTAL ONCE
Status: COMPLETED | OUTPATIENT
Start: 2024-02-16 | End: 2024-02-16

## 2024-02-16 RX ORDER — FENTANYL CITRATE 50 UG/ML
INJECTION, SOLUTION INTRAMUSCULAR; INTRAVENOUS PRN
Status: DISCONTINUED | OUTPATIENT
Start: 2024-02-16 | End: 2024-02-16 | Stop reason: SDUPTHER

## 2024-02-16 RX ORDER — MIDAZOLAM HYDROCHLORIDE 1 MG/ML
INJECTION INTRAMUSCULAR; INTRAVENOUS PRN
Status: DISCONTINUED | OUTPATIENT
Start: 2024-02-16 | End: 2024-02-16 | Stop reason: SDUPTHER

## 2024-02-16 RX ORDER — SODIUM CHLORIDE, SODIUM LACTATE, POTASSIUM CHLORIDE, CALCIUM CHLORIDE 600; 310; 30; 20 MG/100ML; MG/100ML; MG/100ML; MG/100ML
INJECTION, SOLUTION INTRAVENOUS CONTINUOUS
Status: DISCONTINUED | OUTPATIENT
Start: 2024-02-16 | End: 2024-02-17 | Stop reason: HOSPADM

## 2024-02-16 RX ADMIN — TACROLIMUS 0.5 MG: 0.5 CAPSULE ORAL at 19:57

## 2024-02-16 RX ADMIN — SODIUM CHLORIDE, PRESERVATIVE FREE 5 ML: 5 INJECTION INTRAVENOUS at 20:03

## 2024-02-16 RX ADMIN — Medication 250 MG: at 09:06

## 2024-02-16 RX ADMIN — INDOMETHACIN 100 MG: 50 SUPPOSITORY RECTAL at 16:29

## 2024-02-16 RX ADMIN — SUCCINYLCHOLINE CHLORIDE 100 MG: 20 INJECTION INTRAMUSCULAR; INTRAVENOUS at 16:26

## 2024-02-16 RX ADMIN — ACETAMINOPHEN 650 MG: 325 TABLET ORAL at 20:50

## 2024-02-16 RX ADMIN — ONDANSETRON 4 MG: 2 INJECTION INTRAMUSCULAR; INTRAVENOUS at 16:39

## 2024-02-16 RX ADMIN — SODIUM CHLORIDE, SODIUM LACTATE, POTASSIUM CHLORIDE, CALCIUM CHLORIDE: 600; 310; 30; 20 INJECTION, SOLUTION INTRAVENOUS at 16:24

## 2024-02-16 RX ADMIN — Medication 80 MG: at 16:26

## 2024-02-16 RX ADMIN — PROPOFOL 50 MG: 10 INJECTION, EMULSION INTRAVENOUS at 16:39

## 2024-02-16 RX ADMIN — PREDNISONE 5 MG: 5 TABLET ORAL at 09:04

## 2024-02-16 RX ADMIN — POTASSIUM CHLORIDE 40 MEQ: 750 TABLET, FILM COATED, EXTENDED RELEASE ORAL at 09:05

## 2024-02-16 RX ADMIN — Medication 250 MG: at 19:57

## 2024-02-16 RX ADMIN — TACROLIMUS 0.5 MG: 0.5 CAPSULE ORAL at 09:09

## 2024-02-16 RX ADMIN — DEXAMETHASONE SODIUM PHOSPHATE 4 MG: 4 INJECTION, SOLUTION INTRA-ARTICULAR; INTRALESIONAL; INTRAMUSCULAR; INTRAVENOUS; SOFT TISSUE at 16:39

## 2024-02-16 RX ADMIN — ROCURONIUM BROMIDE 10 MG: 10 INJECTION, SOLUTION INTRAVENOUS at 16:26

## 2024-02-16 RX ADMIN — AMLODIPINE BESYLATE 10 MG: 5 TABLET ORAL at 09:04

## 2024-02-16 RX ADMIN — SODIUM CHLORIDE, PRESERVATIVE FREE 5 ML: 5 INJECTION INTRAVENOUS at 20:49

## 2024-02-16 RX ADMIN — PROPOFOL 150 MG: 10 INJECTION, EMULSION INTRAVENOUS at 16:26

## 2024-02-16 RX ADMIN — FENTANYL CITRATE 100 MCG: 50 INJECTION, SOLUTION INTRAMUSCULAR; INTRAVENOUS at 16:26

## 2024-02-16 RX ADMIN — INSULIN GLARGINE 30 UNITS: 100 INJECTION, SOLUTION SUBCUTANEOUS at 20:07

## 2024-02-16 RX ADMIN — CARVEDILOL 25 MG: 12.5 TABLET, FILM COATED ORAL at 09:05

## 2024-02-16 RX ADMIN — DIPHENHYDRAMINE HYDROCHLORIDE 25 MG: 25 CAPSULE ORAL at 10:54

## 2024-02-16 RX ADMIN — IOPAMIDOL 30 ML: 612 INJECTION, SOLUTION INTRAVENOUS at 17:05

## 2024-02-16 RX ADMIN — SODIUM CHLORIDE, PRESERVATIVE FREE 10 ML: 5 INJECTION INTRAVENOUS at 09:04

## 2024-02-16 RX ADMIN — CARVEDILOL 25 MG: 12.5 TABLET, FILM COATED ORAL at 18:49

## 2024-02-16 RX ADMIN — ISOSORBIDE MONONITRATE 60 MG: 30 TABLET, EXTENDED RELEASE ORAL at 09:04

## 2024-02-16 RX ADMIN — DIPHENHYDRAMINE HYDROCHLORIDE 25 MG: 25 CAPSULE ORAL at 01:18

## 2024-02-16 RX ADMIN — MIDAZOLAM HYDROCHLORIDE 2 MG: 1 INJECTION INTRAMUSCULAR; INTRAVENOUS at 16:22

## 2024-02-16 RX ADMIN — INSULIN LISPRO 4 UNITS: 100 INJECTION, SOLUTION INTRAVENOUS; SUBCUTANEOUS at 11:33

## 2024-02-16 ASSESSMENT — PAIN SCALES - GENERAL
PAINLEVEL_OUTOF10: 0
PAINLEVEL_OUTOF10: 2
PAINLEVEL_OUTOF10: 0

## 2024-02-16 ASSESSMENT — PAIN DESCRIPTION - DESCRIPTORS: DESCRIPTORS: TINGLING

## 2024-02-16 ASSESSMENT — PAIN DESCRIPTION - ORIENTATION: ORIENTATION: RIGHT

## 2024-02-16 ASSESSMENT — PAIN - FUNCTIONAL ASSESSMENT: PAIN_FUNCTIONAL_ASSESSMENT: 0-10

## 2024-02-16 ASSESSMENT — PAIN DESCRIPTION - LOCATION: LOCATION: FOOT

## 2024-02-16 NOTE — PLAN OF CARE
Problem: Safety - Adult  Goal: Free from fall injury  2/15/2024 2249 by Deborah Terrazas RN  Outcome: Progressing  2/15/2024 1711 by Magui Joel RN  Outcome: Progressing     Problem: Pain  Goal: Verbalizes/displays adequate comfort level or baseline comfort level  2/15/2024 2249 by Deborah Terrazas RN  Outcome: Progressing  Flowsheets (Taken 2/15/2024 2230)  Verbalizes/displays adequate comfort level or baseline comfort level: Encourage patient to monitor pain and request assistance  2/15/2024 1711 by Magui Joel RN  Outcome: Progressing     Problem: Chronic Conditions and Co-morbidities  Goal: Patient's chronic conditions and co-morbidity symptoms are monitored and maintained or improved  2/15/2024 2249 by Deborah Terrazas RN  Outcome: Progressing  2/15/2024 1711 by Magui Joel RN  Outcome: Progressing

## 2024-02-16 NOTE — CARE COORDINATION
2/16/2024   CARE MANAGEMENT NOTE:  CM reviewed EMR and handoff received from previous  (Jeremiah).  Pt was admitted with acute hepatitis, renal transplant recipient/immunosuppression.  Reportedly, pt resides with her spouse.    RUR 16%    Transition Plan of Care:  GI following for medical management  Plan is for pt to return home  PT eval complete; pt is baseline indepn on 2/15 and no rehab therapies are needed  Outpatient follow up  Spouse will transport pt home    CM will continue to follow pt until discharged.  [place]

## 2024-02-16 NOTE — PROGRESS NOTES
(!) 140/75   Pulse: 69 72 63 66   Resp: 14 18 16 16   Temp: 98.1 °F (36.7 °C) 98.2 °F (36.8 °C) 97.5 °F (36.4 °C) 97.9 °F (36.6 °C)   TempSrc: Oral Oral Oral Oral   SpO2: 98% 98%  98%   Weight:       Height:          @[id number]@       Intake/Output Summary (Last 24 hours) at 2/16/2024 0825  Last data filed at 2/15/2024 0843  Gross per 24 hour   Intake 45 ml   Output --   Net 45 ml          Physical Exam:    Gen:  Well-developed, well-nourished, in no acute distress  HEENT:  Pink conjunctivae, PERRL, hearing intact to voice, moist mucous membranes  Neck:  Supple, without masses, thyroid non-tender  Resp:  No accessory muscle use, clear breath sounds without wheezes rales or rhonchi  Card:  No murmurs, normal S1, S2 without thrills, bruits or peripheral edema  Abd:  Soft, non-tender, non-distended, normoactive bowel sounds are present, no mass  Lymph:  No cervical or inguinal adenopathy  Musc:  No cyanosis or clubbing  Skin:  No rashes or ulcers, skin turgor is good  Neuro:  Cranial nerves are grossly intact, no focal motor weakness, follows commands appropriately  Psych:  Good insight, oriented to person, place and time, alert    Telemetry reviewed:   normal sinus rhythm  __________________________________________________________________  Medications Reviewed: (see below)  Medications:     Current Facility-Administered Medications   Medication Dose Route Frequency    potassium & sodium phosphates (PHOS-NAK) 280-160-250 MG packet 250 mg  1 packet Oral 4x Daily    potassium chloride (KLOR-CON) extended release tablet 40 mEq  40 mEq Oral Daily    amLODIPine (NORVASC) tablet 10 mg  10 mg Oral Daily    carvedilol (COREG) tablet 25 mg  25 mg Oral BID     insulin glargine (LANTUS) injection vial 30 Units  30 Units SubCUTAneous Nightly    predniSONE (DELTASONE) tablet 5 mg  5 mg Oral Daily    tacrolimus (proGRAF) capsule 0.5 mg  0.5 mg Oral BID    glucose chewable tablet 16 g  4 tablet Oral PRN    dextrose bolus 10%

## 2024-02-16 NOTE — OP NOTE
[facility]  Dino Blackburn M.D.  [phone]           2024              ERCP Operative Report    Florina Herrera  :  1955  [facility] Medical Record Number:  [id number]      Procedure Type:   ERCPwith biliary sphincterotomy     Indications: abnormal CT/MRCP    : Dino Blackburn MD    Referring Provider:    Eladio Peterson James C, MD    Exam:  Airway: clear, no airway problems anticipated  Heart: RRR, without gallops or rubs  Lungs: clear bilaterally without wheezes, crackles, or rhonchi  Abdomen: soft, nontender, nondistended, bowel sounds present  Mental Status: awake, alert and oriented to person, place and time    Sedation:  General anesthesia    Procedure Details:  After discussing in detail the procedure with all its risks and potential complications including but not limited to bleeding, perforation, increased risk of pancreatitis with potential lethal complications related to the pancreatitis, sepsis and multiorgan failure and complications related to sedation, informed consent was obtained. Ample amount of time was allowed for questions and alternatives to this procedure were provided. The patient was taken to the fluoroscopy suite and placed in the prone position.  Upon sequential sedation as per above, the Olympus duodenoscope [id number]   was inserted via the mouthpiece and carefully advanced to the stomach where all fluid secretions were suctioned and then the scope advanced to the second portion of the duodenum.   The quality of visualization was excellent.  The duodenoscope was withdrawn into a short position.      Findings:   Esophagus:normal  Stomach: normal   Duodenum/jejunum: normal    Ampulla:small with underlying edematous fold. The punctum appeared small with no bile flow seen at all.  Cholangiogram: Dilated CBD to about 10 mm in diameter with persistent haziness noted in the mid CBD suggestive of a stone or sludge. Intrahepatic ducts seen and within

## 2024-02-16 NOTE — PROGRESS NOTES
Florina JEROME Herrera  1955  [id number]    Situation:  Verbal report received from: REED Peterson   Procedure: Procedure(s):  ERCP ENDOSCOPIC RETROGRADE CHOLANGIOPANCREATOGRAPHY WITH FLUOROSCOPY  ERCP SPHINCTER/PAPILLOTOMY  ERCP STONE REMOVAL    Background:    Preoperative diagnosis: Gall stones [K80.20]  Postoperative diagnosis: * No post-op diagnosis entered *    :  Dr. Blackburn   Assistant(s): Circulator: Annette Ley RN  Endoscopy Technician: Jamal Maynard    Specimens: * No specimens in log *  H. Pylori    no    Assessment:  Intra-procedure medications     Anesthesia gave intra-procedure sedation and medications, see anesthesia flow sheet   yes    Intravenous fluids: NS@ KVO     Vital signs stable   yes    Abdominal assessment: round and soft   yes    Recommendation:  Discharge patient per MD order  inpatient.  Return to floor  n/a  Family or Friend   n/a  Permission to share finding with family or friend   n/a

## 2024-02-16 NOTE — PROGRESS NOTES

## 2024-02-16 NOTE — PROGRESS NOTES
[facility]    Florina Herrera  YOB: 1955          Assessment & Plan:     S/p DDKT 12/2020 at [facility]  Baseline graft function is 1.3-1.4  Generalized itching with elevated LFTs and hyperbilirubinemia  Hypertension  Chronic hypokalemia  Diabetes mellitus type 2     Plan:  Cr/GFR at baseline: IMPROVED  Give potassium chloride 40 meq po x1  Continue tacrolimus 0.5 twice daily and prednisone 5 mg once daily, imuran on hold  Imuran tends to do hepatotoxicity (hepatic sinusoidal obstruction syndrome)  CT scan of abdomen and pelvis reviewed.              Subjective:   CC: DDKT  HPI: Patient seen. DENIES EDEMA OR SOB  ROS: SEE ABOVE  Current Facility-Administered Medications   Medication Dose Route Frequency    indomethacin (INDOCIN) 50 MG suppository 100 mg  100 mg Rectal Once    glucagon injection 1 mg  1 mg IntraVENous Once    iopamidol (ISOVUE-300) 61 % injection 100 mL  100 mL IntraVENous ONCE PRN    potassium & sodium phosphates (PHOS-NAK) 280-160-250 MG packet 250 mg  1 packet Oral 4x Daily    potassium chloride (KLOR-CON) extended release tablet 40 mEq  40 mEq Oral Daily    amLODIPine (NORVASC) tablet 10 mg  10 mg Oral Daily    carvedilol (COREG) tablet 25 mg  25 mg Oral BID WC    insulin glargine (LANTUS) injection vial 30 Units  30 Units SubCUTAneous Nightly    predniSONE (DELTASONE) tablet 5 mg  5 mg Oral Daily    tacrolimus (proGRAF) capsule 0.5 mg  0.5 mg Oral BID    glucose chewable tablet 16 g  4 tablet Oral PRN    dextrose bolus 10% 125 mL  125 mL IntraVENous PRN    Or    dextrose bolus 10% 250 mL  250 mL IntraVENous PRN    glucagon injection 1 mg  1 mg SubCUTAneous PRN    dextrose 10 % infusion   IntraVENous Continuous PRN    sodium chloride flush 0.9 % injection 5-40 mL  5-40 mL IntraVENous 2 times per day    sodium chloride flush 0.9 % injection 5-40 mL  5-40 mL IntraVENous PRN    0.9 % sodium chloride infusion   IntraVENous PRN    [Held  --      --  179  --       Recent Labs     02/16/24  1033   INR 1.0     Needs: urine analysis, urine sodium, protein and creatinine  No results found for: \"CARMELA\", \"KU\", \"CREAU\"      Discussed with:  Family    : Lucas Smith MD  2/16/2024        [place] office:  [address] [place] [address]  Phone - [phone]  Fax - [phone]

## 2024-02-16 NOTE — PROGRESS NOTES
TRANSFER - OUT REPORT:    Verbal report given to REED Parrish on Florina Herrera  being transferred to [facility] for routine post-op       Report consisted of patient's Situation, Background, Assessment and   Recommendations(SBAR).     Information from the following report(s) Nurse Handoff Report was reviewed with the receiving nurse.           Lines:   Peripheral IV 02/16/24 Posterior;Right Hand (Active)   Site Assessment Clean, dry & intact 02/16/24 1726   Line Status Infusing 02/16/24 1726   Line Care Connections checked and tightened 02/16/24 1726   Phlebitis Assessment No symptoms 02/16/24 1726   Infiltration Assessment 0 02/16/24 1726   Alcohol Cap Used Yes 02/16/24 1726   Dressing Status Clean, dry & intact 02/16/24 1726   Dressing Type Transparent 02/16/24 1726   Dressing Intervention New 02/16/24 1620        Opportunity for questions and clarification was provided.      Patient transported with:stable vital signs.

## 2024-02-16 NOTE — ANESTHESIA PRE PROCEDURE
Department of Anesthesiology  Preprocedure Note       Name:  Florina Herrera   Age:  68 y.o.  :  1955                                          MRN:  [id number]         Date:  2024    Acute hepatitis / LFT elevation / Hyperbilirubinemia - POA, unclear etiology. Labs better again today after hydration. Bilirubin is mostly direct.  HIDA showed no BG filling.  MRCP with contracted gallbladder, question of CBD filling defect.  Potentially offending meds include azathioprine and statin.  Consulted GI.  Plan is for ERCP today, versus outpatient next week. Checking broad serologies, viral, etc.  elevated but of unclear significance.      Renal transplant recipient / Immunosuppression due to chronic steroid use / CKD (chronic kidney disease), stage III / Hypophosphatemia / Hypokalemia - POA, unclear recent Cr baseline, no records in our system.  Consulted renal.  Continue prednisone, tacrolimus for now.  Hold azathioprine and valacyclovir.  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  SSI per protocol.  Continue home Lantus, hold jardiance. Check A1c.      CAD (coronary artery disease) / Hypertension - POA, and stable.  Hold ASA in case Bx needed.  Continue coreg and IMDUR.  Hold chlorthalidone until renal weighs in.  Hold atorvastatin due to hepatitis.     Anemia - Mild and only noted after hydration.  Macrocytic.  Serologies normal.     Obesity (BMI 30.0-34.9) - Advise weight loss.  Surgeon: Surgeon(s):  Dino Blackburn MD    Procedure: Procedure(s):  ERCP ENDOSCOPIC RETROGRADE CHOLANGIOPANCREATOGRAPHY    Medications prior to admission:   Prior to Admission medications    Medication Sig Start Date End Date Taking? Authorizing Provider   carvedilol (COREG) 25 MG tablet Take 1 tablet by mouth 2 times daily (with meals) 2/15/24  Yes Sridhar Guerrero MD   amLODIPine (NORVASC) 10 MG tablet Take 1 tablet by mouth daily 24  Yes Sridhar Guerrero MD

## 2024-02-16 NOTE — H&P
Patient seen and examined, chart reviewed.   Case discussed with Dr. Holt, plan to proceed with ERCP.    I have discussed with the patient the procedure at length with its risks and potential complications, including bleeding, perforation, risk of pancreatitis with its own complications including multiorgan failure, even risk of death, risk of anesthesia and the possibility of not being able to cannulate the bile duct. Patient gave full understanding and is willing to proceed. All questions were answered.

## 2024-02-16 NOTE — PROGRESS NOTES
TRANSFER - IN REPORT:    Verbal report received from FedericoRN on Florina Herrera  being received from 530 for ordered procedure      Report consisted of patient's Situation, Background, Assessment and   Recommendations(SBAR).     Information from the following report(s) Nurse Handoff Report was reviewed with the receiving nurse.    Opportunity for questions and clarification was provided.      Assessment completed upon patient's arrival to unit and care assumed.    PIV- Left AC 20g  NPO- yes  Blood thinner- last dose given 2/15/24 per RN.  Blood sugar- 265 at 11:00, 4 units of insulin given at 11:33  Allergy- Lisinopril

## 2024-02-16 NOTE — PROGRESS NOTES
Compression sequential device and sleeves applied per physician order.  Patient has been educated and procedure has been explained.  Sleeves applied to BLE.

## 2024-02-16 NOTE — PROGRESS NOTES
vomiting 2 days ago which she attributes to something she ate, but no more since then.  Patient denies heavy ETOH, recent change in medications, heavy Tylenol use, or history of tattoos or intravenous drug abuse.  Patient denies any known history of chronic liver disease.     02/15/24 : Labs this morning: total bilirubin 7.8 yesterday-->5.1 today, direct 3.2, indirect 1.9. LFTs have decreased overnight, Alk phos 339-->328,  -->209, -->169. She is resting comfortably in bed at the time of my visit and feels a little less bloated than she did yesterday. Benadryl has been helping with pruritus.     02/16/24 : Gallbladder not visualized on HIDA scan yesterday suggesting filling defect. MRCP done overnight with small small nonobstructing filling defect in the common bile duct. LFTs ordered this morning to evaluate for changes or improvement. AMA, ASMA,  AFP, PEPE, and CEA within normal limits. CA 19-9 elevated at 155.      ROS:  The previous review of systems on initial consultation / H&P is noted and reviewed.  Specific changes noted above in HPI.    Current Medications:     Current Facility-Administered Medications   Medication Dose Route Frequency    potassium & sodium phosphates (PHOS-NAK) 280-160-250 MG packet 250 mg  1 packet Oral 4x Daily    potassium chloride (KLOR-CON) extended release tablet 40 mEq  40 mEq Oral Daily    amLODIPine (NORVASC) tablet 10 mg  10 mg Oral Daily    carvedilol (COREG) tablet 25 mg  25 mg Oral BID WC    insulin glargine (LANTUS) injection vial 30 Units  30 Units SubCUTAneous Nightly    predniSONE (DELTASONE) tablet 5 mg  5 mg Oral Daily    tacrolimus (proGRAF) capsule 0.5 mg  0.5 mg Oral BID    glucose chewable tablet 16 g  4 tablet Oral PRN    dextrose bolus 10% 125 mL  125 mL IntraVENous PRN    Or    dextrose bolus 10% 250 mL  250 mL IntraVENous PRN    glucagon injection 1 mg  1 mg SubCUTAneous PRN    dextrose 10 % infusion   IntraVENous Continuous PRN    sodium chloride      02/14/24  0823 02/15/24  0238   WBC 9.1 7.8   HGB 11.5 10.3*   HCT 35.4 31.6*    179     Recent Labs     02/14/24  0823 02/14/24  1215 02/15/24  0238     --  142   K 3.1*  --  2.9*     --  110*   CO2 28  --  27   BUN 28*  --  27*   MG  --  2.0 1.8   PHOS  --  2.6 1.4*   *  --  209*     No results found for: \"GLUCPOC\"  No results for input(s): \"PH\", \"PCO2\", \"PO2\", \"HCO3\", \"FIO2\" in the last 72 hours.  No results for input(s): \"INR\" in the last 72 hours.        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 *      Plan:   (See above)      Signed By: Katherine Mata PA-C     2/16/2024  10:17 AM

## 2024-02-17 VITALS
DIASTOLIC BLOOD PRESSURE: 81 MMHG | RESPIRATION RATE: 18 BRPM | WEIGHT: 205 LBS | HEART RATE: 64 BPM | OXYGEN SATURATION: 99 % | BODY MASS INDEX: 32.18 KG/M2 | HEIGHT: 67 IN | TEMPERATURE: 97.5 F | SYSTOLIC BLOOD PRESSURE: 168 MMHG

## 2024-02-17 LAB
A1AT SERPL-MCNC: 162 MG/DL (ref 101–187)
ALBUMIN SERPL-MCNC: 3 G/DL (ref 3.5–5)
ALBUMIN/GLOB SERPL: 0.8 (ref 1.1–2.2)
ALP SERPL-CCNC: 379 U/L (ref 45–117)
ALT SERPL-CCNC: 133 U/L (ref 12–78)
ANION GAP SERPL CALC-SCNC: 6 MMOL/L (ref 5–15)
AST SERPL-CCNC: 58 U/L (ref 15–37)
BASOPHILS # BLD: 0 K/UL (ref 0–0.1)
BASOPHILS NFR BLD: 0 % (ref 0–1)
BILIRUB SERPL-MCNC: 1.9 MG/DL (ref 0.2–1)
BUN SERPL-MCNC: 20 MG/DL (ref 6–20)
BUN/CREAT SERPL: 16 (ref 12–20)
CALCIUM SERPL-MCNC: 8.6 MG/DL (ref 8.5–10.1)
CERULOPLASMIN SERPL-MCNC: 27.2 MG/DL (ref 19–39)
CHLORIDE SERPL-SCNC: 111 MMOL/L (ref 97–108)
CMV DNA SERPL NAA+PROBE-ACNC: NEGATIVE IU/ML
CMV DNA SERPL NAA+PROBE-LOG IU: NORMAL LOG10 IU/ML
CO2 SERPL-SCNC: 23 MMOL/L (ref 21–32)
CREAT SERPL-MCNC: 1.22 MG/DL (ref 0.55–1.02)
DIFFERENTIAL METHOD BLD: ABNORMAL
EBV DNA SPEC QL NAA+PROBE: NEGATIVE
EOSINOPHIL # BLD: 0 K/UL (ref 0–0.4)
EOSINOPHIL NFR BLD: 0 % (ref 0–7)
ERYTHROCYTE [DISTWIDTH] IN BLOOD BY AUTOMATED COUNT: 15.7 % (ref 11.5–14.5)
GLOBULIN SER CALC-MCNC: 3.7 G/DL (ref 2–4)
GLUCOSE BLD STRIP.AUTO-MCNC: 205 MG/DL (ref 65–117)
GLUCOSE SERPL-MCNC: 251 MG/DL (ref 65–100)
HCT VFR BLD AUTO: 33.7 % (ref 35–47)
HGB BLD-MCNC: 11.1 G/DL (ref 11.5–16)
IMM GRANULOCYTES # BLD AUTO: 0.1 K/UL (ref 0–0.04)
IMM GRANULOCYTES NFR BLD AUTO: 1 % (ref 0–0.5)
INR PPP: 1 (ref 0.9–1.1)
LIPASE SERPL-CCNC: 133 U/L (ref 13–75)
LYMPHOCYTES # BLD: 0.6 K/UL (ref 0.8–3.5)
LYMPHOCYTES NFR BLD: 8 % (ref 12–49)
MCH RBC QN AUTO: 33.1 PG (ref 26–34)
MCHC RBC AUTO-ENTMCNC: 32.9 G/DL (ref 30–36.5)
MCV RBC AUTO: 100.6 FL (ref 80–99)
MONOCYTES # BLD: 0.3 K/UL (ref 0–1)
MONOCYTES NFR BLD: 4 % (ref 5–13)
NEUTS SEG # BLD: 6.2 K/UL (ref 1.8–8)
NEUTS SEG NFR BLD: 87 % (ref 32–75)
NRBC # BLD: 0 K/UL (ref 0–0.01)
NRBC BLD-RTO: 0 PER 100 WBC
PLATELET # BLD AUTO: 197 K/UL (ref 150–400)
PMV BLD AUTO: 11.5 FL (ref 8.9–12.9)
POTASSIUM SERPL-SCNC: 4.1 MMOL/L (ref 3.5–5.1)
PROT SERPL-MCNC: 6.7 G/DL (ref 6.4–8.2)
PROTHROMBIN TIME: 10.5 SEC (ref 9–11.1)
RBC # BLD AUTO: 3.35 M/UL (ref 3.8–5.2)
SERVICE CMNT-IMP: ABNORMAL
SODIUM SERPL-SCNC: 140 MMOL/L (ref 136–145)
SPECIMEN SOURCE: NORMAL
WBC # BLD AUTO: 7.1 K/UL (ref 3.6–11)

## 2024-02-17 PROCEDURE — 36415 COLL VENOUS BLD VENIPUNCTURE: CPT

## 2024-02-17 PROCEDURE — 83690 ASSAY OF LIPASE: CPT

## 2024-02-17 PROCEDURE — 85610 PROTHROMBIN TIME: CPT

## 2024-02-17 PROCEDURE — 80053 COMPREHEN METABOLIC PANEL: CPT

## 2024-02-17 PROCEDURE — 6370000000 HC RX 637 (ALT 250 FOR IP): Performed by: INTERNAL MEDICINE

## 2024-02-17 PROCEDURE — 82962 GLUCOSE BLOOD TEST: CPT

## 2024-02-17 PROCEDURE — 6360000002 HC RX W HCPCS: Performed by: INTERNAL MEDICINE

## 2024-02-17 PROCEDURE — 85025 COMPLETE CBC W/AUTO DIFF WBC: CPT

## 2024-02-17 PROCEDURE — 2580000003 HC RX 258: Performed by: INTERNAL MEDICINE

## 2024-02-17 RX ADMIN — PREDNISONE 5 MG: 5 TABLET ORAL at 08:14

## 2024-02-17 RX ADMIN — ISOSORBIDE MONONITRATE 60 MG: 30 TABLET, EXTENDED RELEASE ORAL at 08:14

## 2024-02-17 RX ADMIN — AMLODIPINE BESYLATE 10 MG: 5 TABLET ORAL at 08:13

## 2024-02-17 RX ADMIN — POTASSIUM CHLORIDE 40 MEQ: 750 TABLET, FILM COATED, EXTENDED RELEASE ORAL at 08:13

## 2024-02-17 RX ADMIN — Medication 250 MG: at 08:14

## 2024-02-17 RX ADMIN — INSULIN LISPRO 2 UNITS: 100 INJECTION, SOLUTION INTRAVENOUS; SUBCUTANEOUS at 08:14

## 2024-02-17 RX ADMIN — SODIUM CHLORIDE, PRESERVATIVE FREE 10 ML: 5 INJECTION INTRAVENOUS at 08:35

## 2024-02-17 RX ADMIN — TACROLIMUS 0.5 MG: 0.5 CAPSULE ORAL at 08:33

## 2024-02-17 RX ADMIN — CARVEDILOL 25 MG: 12.5 TABLET, FILM COATED ORAL at 08:13

## 2024-02-17 ASSESSMENT — PAIN SCALES - GENERAL
PAINLEVEL_OUTOF10: 0

## 2024-02-17 NOTE — PROGRESS NOTES
[facility]    Hospitalist Progress Note    NAME: Florina Herrera   :  1955  MRM:  [id number]    Date/Time of service 2024  8:01 AM    To assist coordination of care and communication with nursing and staff, this note may be preliminary early in the day, but finalized by end of the day.        Assessment and Plan:     Acute hepatitis / LFT elevation / Hyperbilirubinemia - POA, unclear etiology. Labs better again today after hydration. Bilirubin is mostly direct.  HIDA showed no BG filling.  MRCP with contracted gallbladder, question of CBD filling defect.  ERCP suggested ampulla stenosis that was dilated. Potentially offending meds include azathioprine and statin.  Appreciate work by GI.   Checking broad serologies, viral, etc.  elevated but of unclear significance.      Renal transplant recipient / Immunosuppression due to chronic steroid use / CKD (chronic kidney disease), stage III / Hypophosphatemia / Hypokalemia - POA, unclear recent Cr baseline, no records in our system.  Consulted renal.  Continue prednisone, tacrolimus for now.  Hold azathioprine and valacyclovir.  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  SSI per protocol.  Continue home Lantus, hold jardiance. Check A1c.      CAD (coronary artery disease) / Hypertension - POA, and stable.  Hold ASA in case Bx needed.  Continue coreg and IMDUR.  Hold chlorthalidone until renal weighs in.  Hold atorvastatin due to hepatitis.     Anemia - Mild and only noted after hydration.  Macrocytic.  Serologies normal.    Obesity (BMI 30.0-34.9) - Advise weight loss.       Subjective:     Chief Complaint:  better    ROS:  (bold if positive, if negative)    Tolerating PT  Tolerating Diet        Objective:     Last 24hrs VS reviewed since prior progress note. Most recent are:    Vitals:    24 2351 24 0413   BP: (!) 165/88 (!) 142/64  Status: Completed Specimen: Urine Updated: 02/14/24 1037     Specimen HOld       Urine on hold in Microbiology dept for 2 days.  If unpreserved urine is submitted, it cannot be used for addtional testing after 24 hours, recollection will be required.          Urine Culture Hold Sample [[id number]]     Order Status: Sent Specimen: Urine             Other pertinent lab: none    Total time spent with patient: 30 Minutes I personally reviewed chart, notes, data and current medications in the medical record.  I have personally examined and treated the patient at bedside during this period.                  Care Plan discussed with: Patient, Care Manager, Nursing Staff, Consultant/Specialist, and >50% of time spent in counseling and coordination of care    Discussed:  Care Plan and D/C Planning    Prophylaxis:  Lovenox and H2B/PPI    Disposition:  Home w/Family           ___________________________________________________    Attending Physician: Sridhar Guerrero MD

## 2024-02-17 NOTE — PLAN OF CARE
Problem: Safety - Adult  Goal: Free from fall injury  Outcome: Progressing     Problem: Pain  Goal: Verbalizes/displays adequate comfort level or baseline comfort level  Outcome: Progressing     Problem: Chronic Conditions and Co-morbidities  Goal: Patient's chronic conditions and co-morbidity symptoms are monitored and maintained or improved  Outcome: Progressing  Flowsheets (Taken 2/16/2024 2000)  Care Plan - Patient's Chronic Conditions and Co-Morbidity Symptoms are Monitored and Maintained or Improved: Monitor and assess patient's chronic conditions and comorbid symptoms for stability, deterioration, or improvement

## 2024-02-22 NOTE — ANESTHESIA POSTPROCEDURE EVALUATION
Department of Anesthesiology  Postprocedure Note    Patient: Florina Herrera  MRN: [id number]  YOB: 1955  Date of evaluation: 2/22/2024    Procedure Summary       Date: 02/16/24 Room / Location: [facility] ENDO 03 / [facility] ENDOSCOPY    Anesthesia Start: 1622 Anesthesia Stop: 1727    Procedures:       ERCP ENDOSCOPIC RETROGRADE CHOLANGIOPANCREATOGRAPHY WITH FLUOROSCOPY (Upper GI Region)      ERCP SPHINCTER/PAPILLOTOMY (Upper GI Region)      ERCP STONE REMOVAL (Upper GI Region) Diagnosis:       Gall stones      (Gall stones [K80.20])    Surgeons: Dino Blackburn MD Responsible Provider: Matthew Louis MD    Anesthesia Type: General ASA Status: 3            Anesthesia Type: General    Carroll Phase I: Carroll Score: 10    Carroll Phase II: Carroll Score: 10    Anesthesia Post Evaluation    Patient location during evaluation: PACU  Patient participation: complete - patient participated  Level of consciousness: awake and alert  Pain score: 0  Airway patency: patent  Nausea & Vomiting: no vomiting and no nausea  Cardiovascular status: hemodynamically stable  Respiratory status: room air  Hydration status: stable  There was medical reason for not using a multimodal analgesia pain management approach.    No notable events documented.

## 2024-03-04 ENCOUNTER — HOSPITAL ENCOUNTER (OUTPATIENT)
Facility: HOSPITAL | Age: 69
Discharge: HOME | End: 2024-03-07
Payer: MEDICARE

## 2024-03-04 VITALS — BODY MASS INDEX: 31.55 KG/M2 | WEIGHT: 201 LBS | HEIGHT: 67 IN

## 2024-03-04 DIAGNOSIS — Z12.31 SCREENING MAMMOGRAM FOR HIGH-RISK PATIENT: ICD-10-CM

## 2024-03-04 LAB — MERCAPTOPURINE SERPL-MCNC: <20 NG/ML

## 2024-03-04 PROCEDURE — 77067 SCR MAMMO BI INCL CAD: CPT

## 2024-04-18 ENCOUNTER — OFFICE VISIT (OUTPATIENT)
Facility: CLINIC | Age: 69
End: 2024-04-18

## 2024-04-18 VITALS
WEIGHT: 211 LBS | HEIGHT: 67 IN | TEMPERATURE: 98 F | BODY MASS INDEX: 33.12 KG/M2 | OXYGEN SATURATION: 99 % | HEART RATE: 65 BPM | DIASTOLIC BLOOD PRESSURE: 69 MMHG | SYSTOLIC BLOOD PRESSURE: 137 MMHG

## 2024-04-18 DIAGNOSIS — E11.69 TYPE 2 DIABETES MELLITUS WITH OTHER SPECIFIED COMPLICATION, WITH LONG-TERM CURRENT USE OF INSULIN (HCC): ICD-10-CM

## 2024-04-18 DIAGNOSIS — N18.31 STAGE 3A CHRONIC KIDNEY DISEASE (HCC): ICD-10-CM

## 2024-04-18 DIAGNOSIS — Z94.0 RENAL TRANSPLANT RECIPIENT: ICD-10-CM

## 2024-04-18 DIAGNOSIS — Z79.4 TYPE 2 DIABETES MELLITUS WITH OTHER SPECIFIED COMPLICATION, WITH LONG-TERM CURRENT USE OF INSULIN (HCC): ICD-10-CM

## 2024-04-18 DIAGNOSIS — R60.0 BILATERAL LEG EDEMA: ICD-10-CM

## 2024-04-18 DIAGNOSIS — I10 ESSENTIAL (PRIMARY) HYPERTENSION: Primary | ICD-10-CM

## 2024-04-18 PROBLEM — E83.39 HYPOPHOSPHATEMIA: Status: RESOLVED | Noted: 2024-02-15 | Resolved: 2024-04-18

## 2024-04-18 PROBLEM — E80.6 HYPERBILIRUBINEMIA: Status: RESOLVED | Noted: 2024-02-14 | Resolved: 2024-04-18

## 2024-04-18 PROBLEM — E11.9 DIABETES MELLITUS (HCC): Status: ACTIVE | Noted: 2024-02-14

## 2024-04-18 PROBLEM — R79.89 LFT ELEVATION: Status: RESOLVED | Noted: 2024-02-14 | Resolved: 2024-04-18

## 2024-04-18 RX ORDER — BLOOD SUGAR DIAGNOSTIC
STRIP MISCELLANEOUS
COMMUNITY

## 2024-04-18 RX ORDER — CHLORTHALIDONE 25 MG/1
25 TABLET ORAL DAILY
COMMUNITY
Start: 2024-01-16

## 2024-04-18 NOTE — PROGRESS NOTES
Identified pt with two pt identifiers(name and ). Reviewed record in preparation for visit and have obtained necessary documentation. All patient medications has been reviewed.  Chief Complaint   Patient presents with    New Patient     Pt states she has notice swelling for the last week and half. Pt states she was prescribed Amlodipine back in Feb and seen swelling but it went away after stopping the medication.    Leg Swelling     B/l    Establish Care    Joint Swelling      B/l ankle    Foot Swelling     B/l        Vitals:    24 1123   BP: 137/69   Pulse: 65   Temp: 98 °F (36.7 °C)   SpO2: 99%                   Coordination of Care Questionnaire:   1) Have you been to an emergency room, urgent care, or hospitalized since your last visit?   N/a    2. Have seen or consulted any other health care provider since your last visit? N/a          
(HCC)     Obesity (BMI 30.0-34.9)     Renal transplant recipient       Past Surgical History:   Procedure Laterality Date    CYST INCISION AND DRAINAGE      ERCP N/A 2/16/2024    ERCP ENDOSCOPIC RETROGRADE CHOLANGIOPANCREATOGRAPHY WITH FLUOROSCOPY performed by Dino Blackburn MD at [facility] ENDOSCOPY    ERCP N/A 2/16/2024    ERCP SPHINCTER/PAPILLOTOMY performed by Dino Blackburn MD at [facility] ENDOSCOPY    ERCP N/A 2/16/2024    ERCP STONE REMOVAL performed by Dino Blackburn MD at [facility] ENDOSCOPY    GYN      c section    IR TUNNELED CATHETER PLACEMENT GREATER THAN 5 YEARS  6/30/2017    IR TUNNELED CATHETER PLACEMENT GREATER THAN 5 YEARS 6/30/2017 [facility] RAD ANGIO IR      Family History   Problem Relation Age of Onset    Hypertension Father     Kidney Disease Other         uncle, niece, cousin    Breast Cancer Sister     Heart Failure Father         CHF    Lupus Father     Alzheimer's Disease Mother     Diabetes Paternal Aunt         Type 2    Heart Attack Father       Social History     Socioeconomic History    Marital status:      Spouse name: Not on file    Number of children: Not on file    Years of education: Not on file    Highest education level: Not on file   Occupational History    Not on file   Tobacco Use    Smoking status: Never     Passive exposure: Never    Smokeless tobacco: Never   Vaping Use    Vaping Use: Never used   Substance and Sexual Activity    Alcohol use: Yes    Drug use: No    Sexual activity: Not on file   Other Topics Concern    Not on file   Social History Narrative    Not on file     Social Determinants of Health     Financial Resource Strain: Not on file   Food Insecurity: No Food Insecurity (2/14/2024)    Hunger Vital Sign     Worried About Running Out of Food in the Last Year: Never true     Ran Out of Food in the Last Year: Never true   Transportation Needs: No Transportation Needs (2/14/2024)    PRAPARE - Transportation     Lack of Transportation (Medical): No     Lack of Transportation (Non-Medical):

## 2024-04-19 ASSESSMENT — ENCOUNTER SYMPTOMS
NAUSEA: 0
RHINORRHEA: 0
VOMITING: 0
COUGH: 0
SHORTNESS OF BREATH: 0
ABDOMINAL PAIN: 0

## 2024-05-15 LAB
ESTIMATED AVERAGE GLUCOSE: NORMAL
HBA1C MFR BLD: 8.3 %

## 2024-07-15 SDOH — ECONOMIC STABILITY: FOOD INSECURITY: WITHIN THE PAST 12 MONTHS, THE FOOD YOU BOUGHT JUST DIDN'T LAST AND YOU DIDN'T HAVE MONEY TO GET MORE.: NEVER TRUE

## 2024-07-15 SDOH — ECONOMIC STABILITY: HOUSING INSECURITY
IN THE LAST 12 MONTHS, WAS THERE A TIME WHEN YOU DID NOT HAVE A STEADY PLACE TO SLEEP OR SLEPT IN A SHELTER (INCLUDING NOW)?: NO

## 2024-07-15 SDOH — ECONOMIC STABILITY: INCOME INSECURITY: HOW HARD IS IT FOR YOU TO PAY FOR THE VERY BASICS LIKE FOOD, HOUSING, MEDICAL CARE, AND HEATING?: NOT VERY HARD

## 2024-07-15 SDOH — ECONOMIC STABILITY: FOOD INSECURITY: WITHIN THE PAST 12 MONTHS, YOU WORRIED THAT YOUR FOOD WOULD RUN OUT BEFORE YOU GOT MONEY TO BUY MORE.: NEVER TRUE

## 2024-07-15 SDOH — ECONOMIC STABILITY: TRANSPORTATION INSECURITY
IN THE PAST 12 MONTHS, HAS LACK OF TRANSPORTATION KEPT YOU FROM MEETINGS, WORK, OR FROM GETTING THINGS NEEDED FOR DAILY LIVING?: NO

## 2024-07-15 SDOH — HEALTH STABILITY: PHYSICAL HEALTH: ON AVERAGE, HOW MANY DAYS PER WEEK DO YOU ENGAGE IN MODERATE TO STRENUOUS EXERCISE (LIKE A BRISK WALK)?: 1 DAY

## 2024-07-15 SDOH — HEALTH STABILITY: PHYSICAL HEALTH: ON AVERAGE, HOW MANY MINUTES DO YOU ENGAGE IN EXERCISE AT THIS LEVEL?: 0 MIN

## 2024-07-15 ASSESSMENT — LIFESTYLE VARIABLES
HOW OFTEN DO YOU HAVE SIX OR MORE DRINKS ON ONE OCCASION: 1
HOW MANY STANDARD DRINKS CONTAINING ALCOHOL DO YOU HAVE ON A TYPICAL DAY: 0
HOW MANY STANDARD DRINKS CONTAINING ALCOHOL DO YOU HAVE ON A TYPICAL DAY: PATIENT DOES NOT DRINK
HOW OFTEN DO YOU HAVE A DRINK CONTAINING ALCOHOL: NEVER
HOW OFTEN DO YOU HAVE A DRINK CONTAINING ALCOHOL: 1

## 2024-07-15 ASSESSMENT — PATIENT HEALTH QUESTIONNAIRE - PHQ9
1. LITTLE INTEREST OR PLEASURE IN DOING THINGS: NOT AT ALL
SUM OF ALL RESPONSES TO PHQ QUESTIONS 1-9: 0
2. FEELING DOWN, DEPRESSED OR HOPELESS: NOT AT ALL
SUM OF ALL RESPONSES TO PHQ QUESTIONS 1-9: 0
SUM OF ALL RESPONSES TO PHQ9 QUESTIONS 1 & 2: 0
SUM OF ALL RESPONSES TO PHQ QUESTIONS 1-9: 0
SUM OF ALL RESPONSES TO PHQ QUESTIONS 1-9: 0

## 2024-07-18 ENCOUNTER — OFFICE VISIT (OUTPATIENT)
Facility: CLINIC | Age: 69
End: 2024-07-18

## 2024-07-18 VITALS
HEIGHT: 67 IN | DIASTOLIC BLOOD PRESSURE: 66 MMHG | SYSTOLIC BLOOD PRESSURE: 134 MMHG | TEMPERATURE: 97.1 F | WEIGHT: 213 LBS | RESPIRATION RATE: 16 BRPM | OXYGEN SATURATION: 99 % | BODY MASS INDEX: 33.43 KG/M2 | HEART RATE: 66 BPM

## 2024-07-18 DIAGNOSIS — Z78.0 POSTMENOPAUSAL: ICD-10-CM

## 2024-07-18 DIAGNOSIS — E11.69 TYPE 2 DIABETES MELLITUS WITH OTHER SPECIFIED COMPLICATION, WITH LONG-TERM CURRENT USE OF INSULIN (HCC): ICD-10-CM

## 2024-07-18 DIAGNOSIS — I10 ESSENTIAL (PRIMARY) HYPERTENSION: ICD-10-CM

## 2024-07-18 DIAGNOSIS — Z00.00 MEDICARE ANNUAL WELLNESS VISIT, SUBSEQUENT: Primary | ICD-10-CM

## 2024-07-18 DIAGNOSIS — Z79.4 TYPE 2 DIABETES MELLITUS WITH OTHER SPECIFIED COMPLICATION, WITH LONG-TERM CURRENT USE OF INSULIN (HCC): ICD-10-CM

## 2024-07-18 ASSESSMENT — ENCOUNTER SYMPTOMS
SHORTNESS OF BREATH: 0
NAUSEA: 0
ABDOMINAL PAIN: 0
VOMITING: 0
COUGH: 0
RHINORRHEA: 0

## 2024-07-18 NOTE — PROGRESS NOTES
\"Have you been to the ER, urgent care clinic since your last visit?  Hospitalized since your last visit?\"    no    “Have you seen or consulted any other health care providers outside of [facility] since your last visit?”    no        “Have you had a colorectal cancer screening such as a colonoscopy/FIT/Cologuard?    Yes, 2024- [facility]     No colonoscopy on file  No cologuard on file  No FIT/FOBT on file   No flexible sigmoidoscopy on file         Click Here for Release of Records Request   
home hazards, visual acuity, and co-morbidities that can increase risk for falls    Cognitive:   Clock Drawing Test (CDT): Normal  Words recalled: 3 Words Recalled     Total Score Interpretation: Abnormal Mini-Cog      Drug Use:   DAST-10 Score: 1  Interpretation: 1-2 indicates low level use. Recommendation: monitor and re-assess at a later date  Interventions:  No hx of drug use per patient          Inactivity:  On average, how many days per week do you engage in moderate to strenuous exercise (like a brisk walk)?: 1 day (!) Abnormal  On average, how many minutes do you engage in exercise at this level?: 0 min  Interventions - Inactivity:  See AVS for additional education material   Abnormal BMI (obese):  Body mass index is 33.36 kg/m². (!) Abnormal  Interventions:  See AVS for additional education material; was on vacation but is now getting back to a more balanced diet          Safety:  Do you have non-slip mats or non-slip surfaces or shower bars or grab bars in your shower or bathtub?: (!) No  Interventions:  Patient declined any further interventions or treatment     Advanced Directives:  Do you have a Living Will?: (!) No    Intervention:  has NO advanced directive - information provided; states has this already in place            Objective   Vitals:    07/18/24 1040 07/18/24 1116   BP: (!) 173/79 134/66   Site: Left Upper Arm    Position: Sitting    Cuff Size: Small Adult    Pulse: 66    Resp: 16    Temp: 97.1 °F (36.2 °C)    TempSrc: Oral    SpO2: 99%    Weight: 96.6 kg (213 lb)    Height: 1.702 m (5' 7\")       Body mass index is 33.36 kg/m².                Allergies   Allergen Reactions    Penicillins Swelling    Amlodipine Swelling    Lisinopril Swelling     Prior to Visit Medications    Medication Sig Taking? Authorizing Provider   chlorthalidone (HYGROTON) 25 MG tablet Take 0.5 tablets by mouth daily Yes Provider, MD Luisito   vitamin D (CHOLECALCIFEROL) 25 MCG (1000 UT) TABS tablet Take 1 tablet

## 2024-07-25 ENCOUNTER — HOSPITAL ENCOUNTER (OUTPATIENT)
Facility: HOSPITAL | Age: 69
Discharge: HOME | End: 2024-07-25
Attending: STUDENT IN AN ORGANIZED HEALTH CARE EDUCATION/TRAINING PROGRAM
Payer: MEDICARE

## 2024-07-25 VITALS — BODY MASS INDEX: 33.43 KG/M2 | HEIGHT: 67 IN | WEIGHT: 213 LBS

## 2024-07-25 DIAGNOSIS — Z78.0 POSTMENOPAUSAL: ICD-10-CM

## 2024-07-25 PROCEDURE — 77080 DXA BONE DENSITY AXIAL: CPT

## 2024-08-05 DIAGNOSIS — M85.80 OSTEOPENIA, UNSPECIFIED LOCATION: Primary | ICD-10-CM

## 2024-12-18 LAB — HBA1C MFR BLD HPLC: 8.3 %

## 2025-02-06 ENCOUNTER — TRANSCRIBE ORDERS (OUTPATIENT)
Facility: HOSPITAL | Age: 70
End: 2025-02-06

## 2025-02-06 DIAGNOSIS — Z12.31 ENCOUNTER FOR SCREENING MAMMOGRAM FOR MALIGNANT NEOPLASM OF BREAST: Primary | ICD-10-CM

## 2025-03-14 ENCOUNTER — HOSPITAL ENCOUNTER (OUTPATIENT)
Facility: HOSPITAL | Age: 70
Discharge: HOME | End: 2025-03-17
Attending: STUDENT IN AN ORGANIZED HEALTH CARE EDUCATION/TRAINING PROGRAM
Payer: MEDICARE

## 2025-03-14 VITALS — WEIGHT: 213 LBS | HEIGHT: 67 IN | BODY MASS INDEX: 33.43 KG/M2

## 2025-03-14 DIAGNOSIS — Z12.31 ENCOUNTER FOR SCREENING MAMMOGRAM FOR MALIGNANT NEOPLASM OF BREAST: ICD-10-CM

## 2025-03-14 PROCEDURE — 77063 BREAST TOMOSYNTHESIS BI: CPT

## (undated) DEVICE — CANNULATING SPHINCTEROTOME: Brand: JAGTOME™ REVOLUTION RX

## (undated) DEVICE — RETRIEVAL BALLOON CATHETER: Brand: EXTRACTOR™ PRO RX